# Patient Record
Sex: MALE | Race: WHITE | Employment: FULL TIME | URBAN - METROPOLITAN AREA
[De-identification: names, ages, dates, MRNs, and addresses within clinical notes are randomized per-mention and may not be internally consistent; named-entity substitution may affect disease eponyms.]

---

## 2019-03-31 ENCOUNTER — HOSPITAL ENCOUNTER (INPATIENT)
Age: 24
LOS: 2 days | Discharge: HOME | DRG: 420 | End: 2019-04-03
Attending: EMERGENCY MEDICINE | Admitting: HOSPITALIST
Payer: MEDICAID

## 2019-03-31 ENCOUNTER — APPOINTMENT (OUTPATIENT)
Dept: CT IMAGING | Age: 24
DRG: 420 | End: 2019-03-31
Attending: PHYSICIAN ASSISTANT
Payer: MEDICAID

## 2019-03-31 ENCOUNTER — APPOINTMENT (OUTPATIENT)
Dept: GENERAL RADIOLOGY | Age: 24
DRG: 420 | End: 2019-03-31
Attending: PHYSICIAN ASSISTANT
Payer: MEDICAID

## 2019-03-31 DIAGNOSIS — R11.2 NAUSEA AND VOMITING, INTRACTABILITY OF VOMITING NOT SPECIFIED, UNSPECIFIED VOMITING TYPE: ICD-10-CM

## 2019-03-31 DIAGNOSIS — R19.7 DIARRHEA, UNSPECIFIED TYPE: ICD-10-CM

## 2019-03-31 DIAGNOSIS — E10.10 DIABETIC KETOACIDOSIS WITHOUT COMA ASSOCIATED WITH TYPE 1 DIABETES MELLITUS (HCC): Primary | ICD-10-CM

## 2019-03-31 DIAGNOSIS — D72.829 LEUKOCYTOSIS, UNSPECIFIED TYPE: ICD-10-CM

## 2019-03-31 LAB
ADMINISTERED INITIALS, ADMINIT: NORMAL
ALBUMIN SERPL-MCNC: 4.6 G/DL (ref 3.4–5)
ALBUMIN/GLOB SERPL: 1.2 {RATIO} (ref 0.8–1.7)
ALP SERPL-CCNC: 159 U/L (ref 45–117)
ALT SERPL-CCNC: 37 U/L (ref 16–61)
ANION GAP SERPL CALC-SCNC: 27 MMOL/L (ref 3–18)
APPEARANCE UR: CLEAR
AST SERPL-CCNC: 18 U/L (ref 15–37)
BACTERIA URNS QL MICRO: ABNORMAL /HPF
BASOPHILS # BLD: 0 K/UL (ref 0–0.1)
BASOPHILS NFR BLD: 0 % (ref 0–3)
BILIRUB SERPL-MCNC: 1.4 MG/DL (ref 0.2–1)
BILIRUB UR QL: NEGATIVE
BUN SERPL-MCNC: 16 MG/DL (ref 7–18)
BUN/CREAT SERPL: 10 (ref 12–20)
CALCIUM SERPL-MCNC: 9.5 MG/DL (ref 8.5–10.1)
CHLORIDE SERPL-SCNC: 99 MMOL/L (ref 100–108)
CK MB CFR SERPL CALC: 1.6 % (ref 0–4)
CK MB SERPL-MCNC: 1.8 NG/ML (ref 5–25)
CK SERPL-CCNC: 114 U/L (ref 39–308)
CO2 SERPL-SCNC: 9 MMOL/L (ref 21–32)
COLOR UR: YELLOW
CREAT SERPL-MCNC: 1.65 MG/DL (ref 0.6–1.3)
D50 ADMINISTERED, D50ADM: 0 ML
D50 ORDER, D50ORD: 0 ML
DIFFERENTIAL METHOD BLD: ABNORMAL
EOSINOPHIL # BLD: 0 K/UL (ref 0–0.4)
EOSINOPHIL NFR BLD: 0 % (ref 0–5)
EPITH CASTS URNS QL MICRO: ABNORMAL /LPF (ref 0–5)
ERYTHROCYTE [DISTWIDTH] IN BLOOD BY AUTOMATED COUNT: 12.8 % (ref 11.6–14.5)
EST. AVERAGE GLUCOSE BLD GHB EST-MCNC: 275 MG/DL
GLOBULIN SER CALC-MCNC: 3.8 G/DL (ref 2–4)
GLUCOSE BLD STRIP.AUTO-MCNC: 333 MG/DL (ref 70–110)
GLUCOSE BLD STRIP.AUTO-MCNC: 349 MG/DL (ref 70–110)
GLUCOSE SERPL-MCNC: 415 MG/DL (ref 74–99)
GLUCOSE UR STRIP.AUTO-MCNC: >1000 MG/DL
GLUCOSE, GLC: 333 MG/DL
HBA1C MFR BLD: 11.2 % (ref 4.2–5.6)
HCT VFR BLD AUTO: 52.7 % (ref 36–48)
HGB BLD-MCNC: 18 G/DL (ref 13–16)
HGB UR QL STRIP: ABNORMAL
HIGH TARGET, HITG: 180 MG/DL
INSULIN ADMINSTERED, INSADM: 5.5 UNITS/HOUR
INSULIN ORDER, INSORD: 5.5 UNITS/HOUR
KETONES UR QL STRIP.AUTO: >160 MG/DL
LACTATE BLD-SCNC: 3.01 MMOL/L (ref 0.4–2)
LEUKOCYTE ESTERASE UR QL STRIP.AUTO: NEGATIVE
LIPASE SERPL-CCNC: 40 U/L (ref 73–393)
LOW TARGET, LOT: 140 MG/DL
LYMPHOCYTES # BLD: 2.3 K/UL (ref 0.8–3.5)
LYMPHOCYTES NFR BLD: 13 % (ref 20–51)
MAGNESIUM SERPL-MCNC: 2.6 MG/DL (ref 1.6–2.6)
MCH RBC QN AUTO: 29.6 PG (ref 24–34)
MCHC RBC AUTO-ENTMCNC: 34.2 G/DL (ref 31–37)
MCV RBC AUTO: 86.5 FL (ref 74–97)
MINUTES UNTIL NEXT BG, NBG: 60 MIN
MONOCYTES # BLD: 0.7 K/UL (ref 0–1)
MONOCYTES NFR BLD: 4 % (ref 2–9)
MULTIPLIER, MUL: 0.02
NEUTS BAND NFR BLD MANUAL: 5 % (ref 0–5)
NEUTS SEG # BLD: 14 K/UL (ref 1.8–8)
NEUTS SEG NFR BLD: 78 % (ref 42–75)
NITRITE UR QL STRIP.AUTO: NEGATIVE
ORDER INITIALS, ORDINIT: NORMAL
PH UR STRIP: 5.5 [PH] (ref 5–8)
PLATELET # BLD AUTO: 383 K/UL (ref 135–420)
PMV BLD AUTO: 10.5 FL (ref 9.2–11.8)
POTASSIUM SERPL-SCNC: 4.8 MMOL/L (ref 3.5–5.5)
PROT SERPL-MCNC: 8.4 G/DL (ref 6.4–8.2)
PROT UR STRIP-MCNC: 100 MG/DL
RBC # BLD AUTO: 6.09 M/UL (ref 4.7–5.5)
RBC #/AREA URNS HPF: ABNORMAL /HPF (ref 0–5)
RBC MORPH BLD: ABNORMAL
SODIUM SERPL-SCNC: 135 MMOL/L (ref 136–145)
SP GR UR REFRACTOMETRY: 1.03 (ref 1–1.03)
TROPONIN I SERPL-MCNC: <0.02 NG/ML (ref 0–0.04)
UROBILINOGEN UR QL STRIP.AUTO: 0.2 EU/DL (ref 0.2–1)
WBC # BLD AUTO: 18 K/UL (ref 4.6–13.2)
WBC URNS QL MICRO: ABNORMAL /HPF (ref 0–5)

## 2019-03-31 PROCEDURE — 71045 X-RAY EXAM CHEST 1 VIEW: CPT

## 2019-03-31 PROCEDURE — 74177 CT ABD & PELVIS W/CONTRAST: CPT

## 2019-03-31 PROCEDURE — 81001 URINALYSIS AUTO W/SCOPE: CPT

## 2019-03-31 PROCEDURE — 83735 ASSAY OF MAGNESIUM: CPT

## 2019-03-31 PROCEDURE — 74011636637 HC RX REV CODE- 636/637: Performed by: PHYSICIAN ASSISTANT

## 2019-03-31 PROCEDURE — 93005 ELECTROCARDIOGRAM TRACING: CPT

## 2019-03-31 PROCEDURE — 83036 HEMOGLOBIN GLYCOSYLATED A1C: CPT

## 2019-03-31 PROCEDURE — 87077 CULTURE AEROBIC IDENTIFY: CPT

## 2019-03-31 PROCEDURE — 99285 EMERGENCY DEPT VISIT HI MDM: CPT

## 2019-03-31 PROCEDURE — 87186 SC STD MICRODIL/AGAR DIL: CPT

## 2019-03-31 PROCEDURE — 96374 THER/PROPH/DIAG INJ IV PUSH: CPT

## 2019-03-31 PROCEDURE — 96365 THER/PROPH/DIAG IV INF INIT: CPT

## 2019-03-31 PROCEDURE — 74011250636 HC RX REV CODE- 250/636: Performed by: PHYSICIAN ASSISTANT

## 2019-03-31 PROCEDURE — 82550 ASSAY OF CK (CPK): CPT

## 2019-03-31 PROCEDURE — 83605 ASSAY OF LACTIC ACID: CPT

## 2019-03-31 PROCEDURE — 74011636320 HC RX REV CODE- 636/320: Performed by: EMERGENCY MEDICINE

## 2019-03-31 PROCEDURE — 87040 BLOOD CULTURE FOR BACTERIA: CPT

## 2019-03-31 PROCEDURE — 94761 N-INVAS EAR/PLS OXIMETRY MLT: CPT

## 2019-03-31 PROCEDURE — 96361 HYDRATE IV INFUSION ADD-ON: CPT

## 2019-03-31 PROCEDURE — 96367 TX/PROPH/DG ADDL SEQ IV INF: CPT

## 2019-03-31 PROCEDURE — 85025 COMPLETE CBC W/AUTO DIFF WBC: CPT

## 2019-03-31 PROCEDURE — 80053 COMPREHEN METABOLIC PANEL: CPT

## 2019-03-31 PROCEDURE — 83690 ASSAY OF LIPASE: CPT

## 2019-03-31 PROCEDURE — 82962 GLUCOSE BLOOD TEST: CPT

## 2019-03-31 PROCEDURE — 84100 ASSAY OF PHOSPHORUS: CPT

## 2019-03-31 PROCEDURE — 74011000258 HC RX REV CODE- 258: Performed by: PHYSICIAN ASSISTANT

## 2019-03-31 RX ORDER — SODIUM CHLORIDE 0.9 % (FLUSH) 0.9 %
5-10 SYRINGE (ML) INJECTION AS NEEDED
Status: DISCONTINUED | OUTPATIENT
Start: 2019-03-31 | End: 2019-04-03 | Stop reason: HOSPADM

## 2019-03-31 RX ORDER — MAGNESIUM SULFATE 100 %
4 CRYSTALS MISCELLANEOUS AS NEEDED
Status: DISCONTINUED | OUTPATIENT
Start: 2019-03-31 | End: 2019-04-02

## 2019-03-31 RX ORDER — ONDANSETRON 2 MG/ML
4 INJECTION INTRAMUSCULAR; INTRAVENOUS
Status: COMPLETED | OUTPATIENT
Start: 2019-03-31 | End: 2019-03-31

## 2019-03-31 RX ORDER — DEXTROSE 50 % IN WATER (D50W) INTRAVENOUS SYRINGE
25-50 AS NEEDED
Status: DISCONTINUED | OUTPATIENT
Start: 2019-03-31 | End: 2019-04-02

## 2019-03-31 RX ADMIN — SODIUM CHLORIDE 1000 ML: 900 INJECTION, SOLUTION INTRAVENOUS at 22:42

## 2019-03-31 RX ADMIN — SODIUM CHLORIDE 1000 ML: 900 INJECTION, SOLUTION INTRAVENOUS at 22:00

## 2019-03-31 RX ADMIN — ONDANSETRON 4 MG: 2 INJECTION INTRAMUSCULAR; INTRAVENOUS at 22:02

## 2019-03-31 RX ADMIN — HUMAN INSULIN 10 UNITS: 100 INJECTION, SOLUTION SUBCUTANEOUS at 22:38

## 2019-03-31 RX ADMIN — SODIUM CHLORIDE 5.5 UNITS/HR: 900 INJECTION, SOLUTION INTRAVENOUS at 23:31

## 2019-03-31 RX ADMIN — IOPAMIDOL 100 ML: 612 INJECTION, SOLUTION INTRAVENOUS at 23:06

## 2019-03-31 RX ADMIN — PIPERACILLIN AND TAZOBACTAM 3.38 G: 3; .375 INJECTION, POWDER, FOR SOLUTION INTRAVENOUS at 22:57

## 2019-04-01 PROBLEM — N28.9 RENAL INSUFFICIENCY: Status: ACTIVE | Noted: 2019-04-01

## 2019-04-01 PROBLEM — E10.9 TYPE I DIABETES MELLITUS (HCC): Status: ACTIVE | Noted: 2019-04-01

## 2019-04-01 PROBLEM — E11.9 TYPE 2 DIABETES MELLITUS, WITH LONG-TERM CURRENT USE OF INSULIN (HCC): Status: ACTIVE | Noted: 2019-04-01

## 2019-04-01 PROBLEM — Z79.4 TYPE 2 DIABETES MELLITUS, WITH LONG-TERM CURRENT USE OF INSULIN (HCC): Status: ACTIVE | Noted: 2019-04-01

## 2019-04-01 PROBLEM — E11.10 DKA (DIABETIC KETOACIDOSES): Status: ACTIVE | Noted: 2019-04-01

## 2019-04-01 LAB
ADMINISTERED INITIALS, ADMINIT: NORMAL
ANION GAP SERPL CALC-SCNC: 10 MMOL/L (ref 3–18)
ANION GAP SERPL CALC-SCNC: 12 MMOL/L (ref 3–18)
ANION GAP SERPL CALC-SCNC: 13 MMOL/L (ref 3–18)
ANION GAP SERPL CALC-SCNC: 18 MMOL/L (ref 3–18)
ANION GAP SERPL CALC-SCNC: 20 MMOL/L (ref 3–18)
ANION GAP SERPL CALC-SCNC: 6 MMOL/L (ref 3–18)
ANION GAP SERPL CALC-SCNC: 7 MMOL/L (ref 3–18)
ARTERIAL PATENCY WRIST A: ABNORMAL
BASE DEFICIT BLD-SCNC: 22 MMOL/L
BDY SITE: ABNORMAL
BODY TEMPERATURE: 98.6
BUN SERPL-MCNC: 11 MG/DL (ref 7–18)
BUN SERPL-MCNC: 13 MG/DL (ref 7–18)
BUN SERPL-MCNC: 8 MG/DL (ref 7–18)
BUN SERPL-MCNC: 9 MG/DL (ref 7–18)
BUN/CREAT SERPL: 10 (ref 12–20)
BUN/CREAT SERPL: 10 (ref 12–20)
BUN/CREAT SERPL: 7 (ref 12–20)
BUN/CREAT SERPL: 8 (ref 12–20)
BUN/CREAT SERPL: 9 (ref 12–20)
CALCIUM SERPL-MCNC: 7.8 MG/DL (ref 8.5–10.1)
CALCIUM SERPL-MCNC: 7.9 MG/DL (ref 8.5–10.1)
CALCIUM SERPL-MCNC: 8 MG/DL (ref 8.5–10.1)
CALCIUM SERPL-MCNC: 8.2 MG/DL (ref 8.5–10.1)
CALCIUM SERPL-MCNC: 8.2 MG/DL (ref 8.5–10.1)
CALCIUM SERPL-MCNC: 8.4 MG/DL (ref 8.5–10.1)
CALCIUM SERPL-MCNC: 8.5 MG/DL (ref 8.5–10.1)
CHLORIDE SERPL-SCNC: 106 MMOL/L (ref 100–108)
CHLORIDE SERPL-SCNC: 107 MMOL/L (ref 100–108)
CHLORIDE SERPL-SCNC: 108 MMOL/L (ref 100–108)
CHLORIDE SERPL-SCNC: 109 MMOL/L (ref 100–108)
CHLORIDE SERPL-SCNC: 109 MMOL/L (ref 100–108)
CHLORIDE SERPL-SCNC: 110 MMOL/L (ref 100–108)
CHLORIDE SERPL-SCNC: 110 MMOL/L (ref 100–108)
CO2 SERPL-SCNC: 11 MMOL/L (ref 21–32)
CO2 SERPL-SCNC: 17 MMOL/L (ref 21–32)
CO2 SERPL-SCNC: 17 MMOL/L (ref 21–32)
CO2 SERPL-SCNC: 19 MMOL/L (ref 21–32)
CO2 SERPL-SCNC: 22 MMOL/L (ref 21–32)
CO2 SERPL-SCNC: 23 MMOL/L (ref 21–32)
CO2 SERPL-SCNC: 9 MMOL/L (ref 21–32)
CREAT SERPL-MCNC: 1 MG/DL (ref 0.6–1.3)
CREAT SERPL-MCNC: 1.07 MG/DL (ref 0.6–1.3)
CREAT SERPL-MCNC: 1.07 MG/DL (ref 0.6–1.3)
CREAT SERPL-MCNC: 1.11 MG/DL (ref 0.6–1.3)
CREAT SERPL-MCNC: 1.13 MG/DL (ref 0.6–1.3)
CREAT SERPL-MCNC: 1.19 MG/DL (ref 0.6–1.3)
CREAT SERPL-MCNC: 1.28 MG/DL (ref 0.6–1.3)
D50 ADMINISTERED, D50ADM: 0 ML
D50 ORDER, D50ORD: 0 ML
GAS FLOW.O2 O2 DELIVERY SYS: ABNORMAL L/MIN
GLUCOSE BLD STRIP.AUTO-MCNC: 131 MG/DL (ref 70–110)
GLUCOSE BLD STRIP.AUTO-MCNC: 137 MG/DL (ref 70–110)
GLUCOSE BLD STRIP.AUTO-MCNC: 141 MG/DL (ref 70–110)
GLUCOSE BLD STRIP.AUTO-MCNC: 147 MG/DL (ref 70–110)
GLUCOSE BLD STRIP.AUTO-MCNC: 148 MG/DL (ref 70–110)
GLUCOSE BLD STRIP.AUTO-MCNC: 155 MG/DL (ref 70–110)
GLUCOSE BLD STRIP.AUTO-MCNC: 166 MG/DL (ref 70–110)
GLUCOSE BLD STRIP.AUTO-MCNC: 170 MG/DL (ref 70–110)
GLUCOSE BLD STRIP.AUTO-MCNC: 171 MG/DL (ref 70–110)
GLUCOSE BLD STRIP.AUTO-MCNC: 175 MG/DL (ref 70–110)
GLUCOSE BLD STRIP.AUTO-MCNC: 176 MG/DL (ref 70–110)
GLUCOSE BLD STRIP.AUTO-MCNC: 177 MG/DL (ref 70–110)
GLUCOSE BLD STRIP.AUTO-MCNC: 180 MG/DL (ref 70–110)
GLUCOSE BLD STRIP.AUTO-MCNC: 182 MG/DL (ref 70–110)
GLUCOSE BLD STRIP.AUTO-MCNC: 182 MG/DL (ref 70–110)
GLUCOSE BLD STRIP.AUTO-MCNC: 185 MG/DL (ref 70–110)
GLUCOSE BLD STRIP.AUTO-MCNC: 207 MG/DL (ref 70–110)
GLUCOSE BLD STRIP.AUTO-MCNC: 224 MG/DL (ref 70–110)
GLUCOSE BLD STRIP.AUTO-MCNC: 241 MG/DL (ref 70–110)
GLUCOSE BLD STRIP.AUTO-MCNC: 246 MG/DL (ref 70–110)
GLUCOSE BLD STRIP.AUTO-MCNC: 301 MG/DL (ref 70–110)
GLUCOSE SERPL-MCNC: 140 MG/DL (ref 74–99)
GLUCOSE SERPL-MCNC: 171 MG/DL (ref 74–99)
GLUCOSE SERPL-MCNC: 178 MG/DL (ref 74–99)
GLUCOSE SERPL-MCNC: 193 MG/DL (ref 74–99)
GLUCOSE SERPL-MCNC: 213 MG/DL (ref 74–99)
GLUCOSE SERPL-MCNC: 255 MG/DL (ref 74–99)
GLUCOSE SERPL-MCNC: 327 MG/DL (ref 74–99)
GLUCOSE, GLC: 131 MG/DL
GLUCOSE, GLC: 137 MG/DL
GLUCOSE, GLC: 141 MG/DL
GLUCOSE, GLC: 147 MG/DL
GLUCOSE, GLC: 148 MG/DL
GLUCOSE, GLC: 155 MG/DL
GLUCOSE, GLC: 166 MG/DL
GLUCOSE, GLC: 170 MG/DL
GLUCOSE, GLC: 171 MG/DL
GLUCOSE, GLC: 175 MG/DL
GLUCOSE, GLC: 176 MG/DL
GLUCOSE, GLC: 177 MG/DL
GLUCOSE, GLC: 180 MG/DL
GLUCOSE, GLC: 182 MG/DL
GLUCOSE, GLC: 182 MG/DL
GLUCOSE, GLC: 185 MG/DL
GLUCOSE, GLC: 207 MG/DL
GLUCOSE, GLC: 224 MG/DL
GLUCOSE, GLC: 241 MG/DL
GLUCOSE, GLC: 246 MG/DL
GLUCOSE, GLC: 301 MG/DL
HCO3 BLD-SCNC: 7.4 MMOL/L (ref 22–26)
HIGH TARGET, HITG: 180 MG/DL
INSULIN ADMINSTERED, INSADM: 1.6 UNITS/HOUR
INSULIN ADMINSTERED, INSADM: 10.9 UNITS/HOUR
INSULIN ADMINSTERED, INSADM: 2.2 UNITS/HOUR
INSULIN ADMINSTERED, INSADM: 2.3 UNITS/HOUR
INSULIN ADMINSTERED, INSADM: 2.5 UNITS/HOUR
INSULIN ADMINSTERED, INSADM: 2.9 UNITS/HOUR
INSULIN ADMINSTERED, INSADM: 3.1 UNITS/HOUR
INSULIN ADMINSTERED, INSADM: 3.2 UNITS/HOUR
INSULIN ADMINSTERED, INSADM: 3.4 UNITS/HOUR
INSULIN ADMINSTERED, INSADM: 3.5 UNITS/HOUR
INSULIN ADMINSTERED, INSADM: 3.6 UNITS/HOUR
INSULIN ADMINSTERED, INSADM: 3.7 UNITS/HOUR
INSULIN ADMINSTERED, INSADM: 3.9 UNITS/HOUR
INSULIN ADMINSTERED, INSADM: 4.2 UNITS/HOUR
INSULIN ADMINSTERED, INSADM: 4.4 UNITS/HOUR
INSULIN ADMINSTERED, INSADM: 5.8 UNITS/HOUR
INSULIN ADMINSTERED, INSADM: 5.9 UNITS/HOUR
INSULIN ADMINSTERED, INSADM: 6.1 UNITS/HOUR
INSULIN ADMINSTERED, INSADM: 6.6 UNITS/HOUR
INSULIN ADMINSTERED, INSADM: 9.3 UNITS/HOUR
INSULIN ADMINSTERED, INSADM: 9.6 UNITS/HOUR
INSULIN ORDER, INSORD: 1.6 UNITS/HOUR
INSULIN ORDER, INSORD: 10.9 UNITS/HOUR
INSULIN ORDER, INSORD: 2.2 UNITS/HOUR
INSULIN ORDER, INSORD: 2.3 UNITS/HOUR
INSULIN ORDER, INSORD: 2.5 UNITS/HOUR
INSULIN ORDER, INSORD: 2.9 UNITS/HOUR
INSULIN ORDER, INSORD: 3.1 UNITS/HOUR
INSULIN ORDER, INSORD: 3.2 UNITS/HOUR
INSULIN ORDER, INSORD: 3.4 UNITS/HOUR
INSULIN ORDER, INSORD: 3.5 UNITS/HOUR
INSULIN ORDER, INSORD: 3.6 UNITS/HOUR
INSULIN ORDER, INSORD: 3.7 UNITS/HOUR
INSULIN ORDER, INSORD: 3.9 UNITS/HOUR
INSULIN ORDER, INSORD: 4.2 UNITS/HOUR
INSULIN ORDER, INSORD: 4.4 UNITS/HOUR
INSULIN ORDER, INSORD: 5.8 UNITS/HOUR
INSULIN ORDER, INSORD: 5.9 UNITS/HOUR
INSULIN ORDER, INSORD: 6.1 UNITS/HOUR
INSULIN ORDER, INSORD: 6.6 UNITS/HOUR
INSULIN ORDER, INSORD: 9.3 UNITS/HOUR
INSULIN ORDER, INSORD: 9.6 UNITS/HOUR
LACTATE BLD-SCNC: 0.94 MMOL/L (ref 0.4–2)
LOW TARGET, LOT: 140 MG/DL
MINUTES UNTIL NEXT BG, NBG: 60 MIN
MULTIPLIER, MUL: 0.01
MULTIPLIER, MUL: 0.02
MULTIPLIER, MUL: 0.03
MULTIPLIER, MUL: 0.04
MULTIPLIER, MUL: 0.05
MULTIPLIER, MUL: 0.06
MULTIPLIER, MUL: 0.06
O2/TOTAL GAS SETTING VFR VENT: 0.21 %
ORDER INITIALS, ORDINIT: NORMAL
PCO2 BLD: 22.9 MMHG (ref 35–45)
PH BLD: 7.12 [PH] (ref 7.35–7.45)
PHOSPHATE SERPL-MCNC: 6.8 MG/DL (ref 2.5–4.9)
PO2 BLD: 122 MMHG (ref 80–100)
POTASSIUM SERPL-SCNC: 3.3 MMOL/L (ref 3.5–5.5)
POTASSIUM SERPL-SCNC: 3.5 MMOL/L (ref 3.5–5.5)
POTASSIUM SERPL-SCNC: 3.6 MMOL/L (ref 3.5–5.5)
POTASSIUM SERPL-SCNC: 3.9 MMOL/L (ref 3.5–5.5)
POTASSIUM SERPL-SCNC: 4.8 MMOL/L (ref 3.5–5.5)
POTASSIUM SERPL-SCNC: 4.8 MMOL/L (ref 3.5–5.5)
POTASSIUM SERPL-SCNC: 5.3 MMOL/L (ref 3.5–5.5)
SAO2 % BLD: 97 % (ref 92–97)
SERVICE CMNT-IMP: ABNORMAL
SODIUM SERPL-SCNC: 135 MMOL/L (ref 136–145)
SODIUM SERPL-SCNC: 136 MMOL/L (ref 136–145)
SODIUM SERPL-SCNC: 138 MMOL/L (ref 136–145)
SODIUM SERPL-SCNC: 140 MMOL/L (ref 136–145)
SPECIMEN TYPE: ABNORMAL
TOTAL RESP. RATE, ITRR: 26

## 2019-04-01 PROCEDURE — 74011000258 HC RX REV CODE- 258: Performed by: HOSPITALIST

## 2019-04-01 PROCEDURE — 74011250636 HC RX REV CODE- 250/636: Performed by: PHYSICIAN ASSISTANT

## 2019-04-01 PROCEDURE — 83605 ASSAY OF LACTIC ACID: CPT

## 2019-04-01 PROCEDURE — 74011636637 HC RX REV CODE- 636/637: Performed by: PHYSICIAN ASSISTANT

## 2019-04-01 PROCEDURE — 74011000250 HC RX REV CODE- 250: Performed by: PHYSICIAN ASSISTANT

## 2019-04-01 PROCEDURE — 74011000258 HC RX REV CODE- 258: Performed by: PHYSICIAN ASSISTANT

## 2019-04-01 PROCEDURE — 80048 BASIC METABOLIC PNL TOTAL CA: CPT

## 2019-04-01 PROCEDURE — 36600 WITHDRAWAL OF ARTERIAL BLOOD: CPT

## 2019-04-01 PROCEDURE — 74011250637 HC RX REV CODE- 250/637: Performed by: INTERNAL MEDICINE

## 2019-04-01 PROCEDURE — 65610000006 HC RM INTENSIVE CARE

## 2019-04-01 PROCEDURE — 82962 GLUCOSE BLOOD TEST: CPT

## 2019-04-01 PROCEDURE — 83735 ASSAY OF MAGNESIUM: CPT

## 2019-04-01 PROCEDURE — 74011250636 HC RX REV CODE- 250/636: Performed by: INTERNAL MEDICINE

## 2019-04-01 PROCEDURE — 82803 BLOOD GASES ANY COMBINATION: CPT

## 2019-04-01 PROCEDURE — 36415 COLL VENOUS BLD VENIPUNCTURE: CPT

## 2019-04-01 RX ORDER — POTASSIUM CHLORIDE 750 MG/1
10 TABLET, EXTENDED RELEASE ORAL
Status: DISCONTINUED | OUTPATIENT
Start: 2019-04-01 | End: 2019-04-01

## 2019-04-01 RX ORDER — SODIUM CHLORIDE 9 MG/ML
250 INJECTION, SOLUTION INTRAVENOUS CONTINUOUS
Status: DISCONTINUED | OUTPATIENT
Start: 2019-04-01 | End: 2019-04-01

## 2019-04-01 RX ORDER — ONDANSETRON 2 MG/ML
4 INJECTION INTRAMUSCULAR; INTRAVENOUS
Status: DISCONTINUED | OUTPATIENT
Start: 2019-04-01 | End: 2019-04-03 | Stop reason: HOSPADM

## 2019-04-01 RX ORDER — POTASSIUM CHLORIDE 20 MEQ/1
40 TABLET, EXTENDED RELEASE ORAL
Status: COMPLETED | OUTPATIENT
Start: 2019-04-01 | End: 2019-04-01

## 2019-04-01 RX ORDER — HEPARIN SODIUM 5000 [USP'U]/ML
5000 INJECTION, SOLUTION INTRAVENOUS; SUBCUTANEOUS EVERY 8 HOURS
Status: DISCONTINUED | OUTPATIENT
Start: 2019-04-01 | End: 2019-04-03 | Stop reason: HOSPADM

## 2019-04-01 RX ORDER — DEXTROSE MONOHYDRATE AND SODIUM CHLORIDE 5; .9 G/100ML; G/100ML
75 INJECTION, SOLUTION INTRAVENOUS CONTINUOUS
Status: DISCONTINUED | OUTPATIENT
Start: 2019-04-01 | End: 2019-04-03 | Stop reason: HOSPADM

## 2019-04-01 RX ORDER — POTASSIUM CHLORIDE 7.45 MG/ML
10 INJECTION INTRAVENOUS
Status: DISCONTINUED | OUTPATIENT
Start: 2019-04-02 | End: 2019-04-02

## 2019-04-01 RX ORDER — SODIUM CHLORIDE 9 MG/ML
150 INJECTION, SOLUTION INTRAVENOUS CONTINUOUS
Status: DISCONTINUED | OUTPATIENT
Start: 2019-04-01 | End: 2019-04-01

## 2019-04-01 RX ORDER — POTASSIUM CHLORIDE 7.45 MG/ML
10 INJECTION INTRAVENOUS
Status: COMPLETED | OUTPATIENT
Start: 2019-04-01 | End: 2019-04-01

## 2019-04-01 RX ORDER — POTASSIUM CHLORIDE 20 MEQ/1
40 TABLET, EXTENDED RELEASE ORAL ONCE
Status: COMPLETED | OUTPATIENT
Start: 2019-04-01 | End: 2019-04-01

## 2019-04-01 RX ADMIN — POTASSIUM CHLORIDE 10 MEQ: 10 INJECTION, SOLUTION INTRAVENOUS at 14:55

## 2019-04-01 RX ADMIN — PIPERACILLIN AND TAZOBACTAM 3.38 G: 3; .375 INJECTION, POWDER, FOR SOLUTION INTRAVENOUS at 04:09

## 2019-04-01 RX ADMIN — HEPARIN SODIUM 5000 UNITS: 5000 INJECTION INTRAVENOUS; SUBCUTANEOUS at 10:07

## 2019-04-01 RX ADMIN — SODIUM CHLORIDE 150 ML/HR: 900 INJECTION, SOLUTION INTRAVENOUS at 00:39

## 2019-04-01 RX ADMIN — HEPARIN SODIUM 5000 UNITS: 5000 INJECTION INTRAVENOUS; SUBCUTANEOUS at 17:37

## 2019-04-01 RX ADMIN — DEXTROSE MONOHYDRATE AND SODIUM CHLORIDE 150 ML/HR: 5; .9 INJECTION, SOLUTION INTRAVENOUS at 02:54

## 2019-04-01 RX ADMIN — DEXTROSE MONOHYDRATE: 5 INJECTION, SOLUTION INTRAVENOUS at 06:28

## 2019-04-01 RX ADMIN — SODIUM CHLORIDE 6.6 UNITS/HR: 900 INJECTION, SOLUTION INTRAVENOUS at 20:46

## 2019-04-01 RX ADMIN — SODIUM CHLORIDE 250 ML/HR: 900 INJECTION, SOLUTION INTRAVENOUS at 00:10

## 2019-04-01 RX ADMIN — POTASSIUM CHLORIDE 40 MEQ: 20 TABLET, EXTENDED RELEASE ORAL at 13:22

## 2019-04-01 RX ADMIN — POTASSIUM CHLORIDE 10 MEQ: 10 INJECTION, SOLUTION INTRAVENOUS at 15:24

## 2019-04-01 RX ADMIN — POTASSIUM CHLORIDE 40 MEQ: 20 TABLET, EXTENDED RELEASE ORAL at 20:25

## 2019-04-01 RX ADMIN — POTASSIUM CHLORIDE 10 MEQ: 10 INJECTION, SOLUTION INTRAVENOUS at 13:22

## 2019-04-01 RX ADMIN — DEXTROSE MONOHYDRATE AND SODIUM CHLORIDE 100 ML/HR: 5; .9 INJECTION, SOLUTION INTRAVENOUS at 12:16

## 2019-04-01 RX ADMIN — DEXTROSE MONOHYDRATE AND SODIUM CHLORIDE 125 ML/HR: 5; .9 INJECTION, SOLUTION INTRAVENOUS at 21:44

## 2019-04-01 RX ADMIN — PIPERACILLIN AND TAZOBACTAM 3.38 G: 3; .375 INJECTION, POWDER, FOR SOLUTION INTRAVENOUS at 10:07

## 2019-04-01 RX ADMIN — PIPERACILLIN AND TAZOBACTAM 3.38 G: 3; .375 INJECTION, POWDER, FOR SOLUTION INTRAVENOUS at 17:37

## 2019-04-01 RX ADMIN — HEPARIN SODIUM 5000 UNITS: 5000 INJECTION INTRAVENOUS; SUBCUTANEOUS at 01:10

## 2019-04-01 RX ADMIN — ONDANSETRON 4 MG: 2 INJECTION INTRAMUSCULAR; INTRAVENOUS at 01:16

## 2019-04-01 NOTE — DIABETES MGMT
NUTRITION / GLYCEMIC CONTROL PLAN OF CARE 
- consult received r/t DKA & pt on Glucostabilizer insulin drip, pt with known h/o poorly managed DM1 ~ 19 years - HbA1c not within recommended range for age + comorbids, r/t non-adherence to DM regimen, insulin degradation and SMBG inconsistency - per pt rationing insulin, infusion sets and test strips r/t w/o insurance until recent approval for medicaid; pt wearing infusion set for 6 days causing degradation of insulin 
- Dr. Lazaro Mendoza Endocrinologist, last visit June 2018 r/t lack insurance, pt reports he has an appointment soon 
- pt does have h/o major depressive disorder, SI with TDO & IP behavioral health last documentation September 2018 (RN & supervisor notified) 
- recommend transition pt to basal/bolus IP, 24 hours after last Lantus dose given prior to d/c transition to [employer] insulin pump; see insulin pump consult - pt will need the following scripts at d/c *Humalog vial for insulin pump *Lancets and strips for 4-6x/day SMBG 
 *Glucagon Kit Diabetes Management:  
- recommend: insulin pump basal rates 1-1.5 units/hr; transition to basal/bolus IP regimen with goal of pt starting insulin pump prior to d/c; BG trending up with PO intake; pt has T1DM, please & must have basal + mealtime + corrective coverage orders  
- education: (see GC RN note) 
- goals: *BG will be in target range of 110-140 mg/dl (non-ICU) by 4/8 *PO intake will be 75% of meals offered by 4/8 *Pt will follow up with OP PCP for Diabetes Management by 5/31 
- TDD: insulin gtt 
- BG range: 147-327 mg/dl - Hypo: no 
- BG in target range (non-ICU: <140; ICU<180): [] Yes  [x] No 
- Steroids: no 
- Intake:  
 Patient Vitals for the past 100 hrs: 
 % Diet Eaten 04/01/19 0904 100 % Current Insulin regimen:  
Insulin gtt Home medication/insulin regimen: 
Medtronic home insulin pump Recent Glucose Results:  
Lab Results Component Value Date/Time  (H) 04/01/2019 03:25 PM  
  (H) 04/01/2019 11:49 AM  
  (H) 04/01/2019 08:00 AM  
 GLUCPOC 301 (H) 04/01/2019 03:22 PM  
 GLUCPOC 137 (H) 04/01/2019 12:13 PM  
 GLUCPOC 182 (H) 04/01/2019 11:07 AM  
 
Adequate glycemic control PTA:  [] Yes  [x] No 
 
HbA1c: equivalent  to ave BGlucose of 275 mg/dl for 2-3 months prior to admission Lab Results Component Value Date/Time Hemoglobin A1c 11.2 (H) 03/31/2019 09:35 PM  
Diet:  
Active Orders Diet DIET DIABETIC WITH OPTIONS Consistent Carb 2000kcal; Regular [place] MS, RN, CDE Glycemic Control Team 
[phone] Pager [phone] (M-TH 8:00-4:30P) *After Hours pager [phone]

## 2019-04-01 NOTE — ED NOTES
Verbal shift change report given to Gavino De Guzman (oncoming nurse) by Georgia Box (offgoing nurse). Report included the following information SBAR, Kardex, ED Summary, Procedure Summary, Intake/Output, MAR, Recent Results and Cardiac Rhythm sinus tach.

## 2019-04-01 NOTE — CONSULTS
[facility]                  Pulmonary, Critical Care, and Sleep Medicine     Name: Lance Moctezuma MRN: [id number]   : 1995 Hospital: [facility] [place]    Date: 2019        PCCM Note                                              Consult requesting physician: Dr. Padmaja Wyatt   Reason for Consult: DKA, acidosis    Subjective/History of Present Illness:     Patient is a 24 y.o. male with PMHx significant for ADD, seizure d/o, IDDM on insulin pump, admitted with DKA    2019   Pt admitted being noncompliant to insulin since he was not feeling well and feeling \"sick\"  Admitted with DKA  On insulin drip  On bicarb drip for acidosis  Lactate normalized  No fever chills  Nausea and abd pain improved. Ct abd pelvis negative  No leg edema cp dyspnea cough sinus pain dysuria  No other issues overnight. The patient is critically ill. History taken from patient and EMR    Review of Systems:  A comprehensive review of systems was negative except for that written in the HPI. Review of Systems:   HEENT: No epistaxis, no nasal drainage, no difficulty in swallowing, no redness in eyes  Respiratory: as above  Cardiovascular: no chest pain, no palpitations, no chronic leg edema, no syncope  Gastrointestinal: no diarrhea, no bleeding symptoms  Genitourinary: No urinary symptoms or hematuria  Integument/breast: No ulcers or rashes  Musculoskeletal:Neg  Neurological: No focal weakness, no seizures, no headaches  Behvioral/Psych: No anxiety, no depression  Constitutional: No fever, no chills, no weight loss, no night sweats     No Known Allergies   Past Medical History:   Diagnosis Date    ADD (attention deficit disorder)     Diabetes ([facility] [address] [facility]     Insomnia     [facility]       History reviewed. No pertinent surgical history. Social History     Tobacco Use    Smoking status: Never Smoker    Smokeless tobacco: Current User   Substance Use Topics    Alcohol use:  Yes Alcohol/week: 9.6 oz     Types: 10 Cans of beer, 6 Shots of liquor per week      History reviewed. No pertinent family history. Prior to Admission medications    Medication Sig Start Date End Date Taking? Authorizing Provider   fluoxetine HCl (PROZAC PO) Take  by mouth. Yes Other, MD Brittni   RANITIDINE HCL PO Take  by mouth. Yes Other, MD Brittni   promethazine HCl (PROMETHAZINE PO) Take  by mouth. Yes Other, MD Brittni   AMPHET ASP/AMPHET/D-AMPHET (ADDERALL PO) Take  by mouth. Yes Other, MD Brittni   insulin lispro (HUMALOG) 100 unit/mL injection by SubCUTAneous route. Yes Shahzad, MD Brittni    insulin pump (PATIENT SUPPLIED) Misc by SubCUTAneous route as needed. Yes Other, MD Brittni   HYDROcodone-acetaminophen (NORCO) 5-325 mg per tablet Take 1 Tab by mouth every four (4) hours as needed for Pain. 13   Jordana Townsend NP   nystatin-TCN-HC-diphenhydramin (FIRST-RICCO'S MOUTHWASH) SusR suspension Take 5 mL by mouth every six (6) hours.  13   Jordana Townsend NP     Current Facility-Administered Medications   Medication Dose Route Frequency    heparin (porcine) injection 5,000 Units  5,000 Units SubCUTAneous Q8H    dextrose 5% and 0.9% NaCl infusion  100 mL/hr IntraVENous CONTINUOUS    sodium bicarbonate (8.4%) 50 mEq in dextrose 5% 1,000 mL infusion   IntraVENous CONTINUOUS    piperacillin-tazobactam (ZOSYN) 3.375 g in 0.9% sodium chloride (MBP/ADV) 100 mL MBP  3.375 g IntraVENous Q6H    insulin regular (NOVOLIN R, HUMULIN R) 100 Units in 0.9% sodium chloride 100 mL infusion  0-50 Units/hr IntraVENous TITRATE         Objective:   Vital Signs:    Visit Vitals  /75   Pulse (!) 104   Temp 98.5 °F (36.9 °C)   Resp 22   Ht 5' 11\" (1.803 m)   Wt 73.2 kg (161 lb 6 oz)   SpO2 100%   BMI 22.51 kg/m²       O2 Device: Room air       Temp (24hrs), Av.2 °F (36.8 °C), Min:97.4 °F (36.3 °C), Max:98.6 °F (37 °C)       Intake/Output:   Last shift:      701 - 1900  In: 120 [P.O.:120]  Out: - Last 3 shifts: 03/30 1901 - 04/01 0700  In: 3062.2 [I.V.:3062.2]  Out: 8625 [Urine:1675]      Intake/Output Summary (Last 24 hours) at 4/1/2019 1057  Last data filed at 4/1/2019 0904  Gross per 24 hour   Intake 3182.17 ml   Output 1675 ml   Net 1507.17 ml       Last 3 Recorded Weights in this Encounter    03/31/19 2104 04/01/19 0045   Weight: 77.1 kg (170 lb) 73.2 kg (161 lb 6 oz)       Physical Exam:     General/Neurology: Alert, Awake, NAD. Head:   Normocephalic, without obvious abnormality, atraumatic. Eye:   EOM intact, no scleral icterus, no pallor, no cyanosis. Nose:   No sinus tenderness. Throat:  Lips, mucosa, and tongue normal. No oral thrush. Neck:   Supple, symmetric. No lymphadenopathy. Trachea midline  Lung: Moderate air entry bilateral equal. No rales. No rhonchi. No wheezing. No stridors. No prolongded expiration. No accessory muscle use. Heart:   Regular rate & rhythm. S1 S2 present. No murmur. No JVD. Abdomen:  Soft. NT. ND. +BS. No masses. Extremities:  No pedal edema. No cyanosis. No clubbing. Pulses: 2+ and symmetric in DP. Capillary refill: normal  Lymphatic:  No cervical or supraclavicular palpable lymphadenopathy. Musculoskeletal: No joint swelling. No tenderness. Skin:   Color, texture, turgor normal. No rashes or lesions.        Data:       Recent Results (from the past 24 hour(s))   GLUCOSE, POC    Collection Time: 03/31/19  9:15 PM   Result Value Ref Range    Glucose (POC) 349 (H) 70 - 110 mg/dL   URINALYSIS W/ RFLX MICROSCOPIC    Collection Time: 03/31/19  9:30 PM   Result Value Ref Range    Color YELLOW      Appearance CLEAR      Specific gravity 1.030 1.005 - 1.030      pH (UA) 5.5 5.0 - 8.0      Protein 100 (A) NEG mg/dL    Glucose >1,000 (A) NEG mg/dL    Ketone >160 (A) NEG mg/dL    Bilirubin NEGATIVE  NEG      Blood SMALL (A) NEG      Urobilinogen 0.2 0.2 - 1.0 EU/dL    Nitrites NEGATIVE  NEG      Leukocyte Esterase NEGATIVE  NEG     URINE MICROSCOPIC ONLY Collection Time: 03/31/19  9:30 PM   Result Value Ref Range    WBC 0 to 3 0 - 5 /hpf    RBC 5 to 10 0 - 5 /hpf    Epithelial cells 1+ 0 - 5 /lpf    Bacteria 1+ (A) NEG /hpf   LIPASE    Collection Time: 03/31/19  9:35 PM   Result Value Ref Range    Lipase 40 (L) 73 - 205 U/L   METABOLIC PANEL, COMPREHENSIVE    Collection Time: 03/31/19  9:35 PM   Result Value Ref Range    Sodium 135 (L) 136 - 145 mmol/L    Potassium 4.8 3.5 - 5.5 mmol/L    Chloride 99 (L) 100 - 108 mmol/L    CO2 9 (LL) 21 - 32 mmol/L    Anion gap 27 (H) 3.0 - 18 mmol/L    Glucose 415 (HH) 74 - 99 mg/dL    BUN 16 7.0 - 18 MG/DL    Creatinine 1.65 (H) 0.6 - 1.3 MG/DL    BUN/Creatinine ratio 10 (L) 12 - 20      GFR est AA >60 >60 ml/min/1.73m2    GFR est non-AA 52 (L) >60 ml/min/1.73m2    Calcium 9.5 8.5 - 10.1 MG/DL    Bilirubin, total 1.4 (H) 0.2 - 1.0 MG/DL    ALT (SGPT) 37 16 - 61 U/L    AST (SGOT) 18 15 - 37 U/L    Alk. phosphatase 159 (H) 45 - 117 U/L    Protein, total 8.4 (H) 6.4 - 8.2 g/dL    Albumin 4.6 3.4 - 5.0 g/dL    Globulin 3.8 2.0 - 4.0 g/dL    A-G Ratio 1.2 0.8 - 1.7     CBC WITH AUTOMATED DIFF    Collection Time: 03/31/19  9:35 PM   Result Value Ref Range    WBC 18.0 (H) 4.6 - 13.2 K/uL    RBC 6.09 (H) 4.70 - 5.50 M/uL    HGB 18.0 (H) 13.0 - 16.0 g/dL    HCT 52.7 (H) 36.0 - 48.0 %    MCV 86.5 74.0 - 97.0 FL    MCH 29.6 24.0 - 34.0 PG    MCHC 34.2 31.0 - 37.0 g/dL    RDW 12.8 11.6 - 14.5 %    PLATELET 301 585 - 561 K/uL    MPV 10.5 9.2 - 11.8 FL    NEUTROPHILS 78 (H) 42 - 75 %    BAND NEUTROPHILS 5 0 - 5 %    LYMPHOCYTES 13 (L) 20 - 51 %    MONOCYTES 4 2 - 9 %    EOSINOPHILS 0 0 - 5 %    BASOPHILS 0 0 - 3 %    ABS. NEUTROPHILS 14.0 (H) 1.8 - 8.0 K/UL    ABS. LYMPHOCYTES 2.3 0.8 - 3.5 K/UL    ABS. MONOCYTES 0.7 0 - 1.0 K/UL    ABS. EOSINOPHILS 0.0 0.0 - 0.4 K/UL    ABS.  BASOPHILS 0.0 0.0 - 0.1 K/UL    RBC COMMENTS NORMOCYTIC, NORMOCHROMIC      DF MANUAL     CARDIAC PANEL,(CK, CKMB & TROPONIN)    Collection Time: 03/31/19  9:35 PM Result Value Ref Range     39 - 308 U/L    CK - MB 1.8 <3.6 ng/ml    CK-MB Index 1.6 0.0 - 4.0 %    Troponin-I, QT <0.02 0.0 - 0.045 NG/ML   MAGNESIUM    Collection Time: 03/31/19  9:35 PM   Result Value Ref Range    Magnesium 2.6 1.6 - 2.6 mg/dL   PHOSPHORUS    Collection Time: 03/31/19  9:35 PM   Result Value Ref Range    Phosphorus 6.8 (H) 2.5 - 4.9 MG/DL   HEMOGLOBIN A1C WITH EAG    Collection Time: 03/31/19  9:35 PM   Result Value Ref Range    Hemoglobin A1c 11.2 (H) 4.2 - 5.6 %    Est. average glucose 275 mg/dL   EKG, 12 LEAD, INITIAL    Collection Time: 03/31/19  9:37 PM   Result Value Ref Range    Ventricular Rate 118 BPM    Atrial Rate 118 BPM    P-R Interval 158 ms    QRS Duration 98 ms    Q-T Interval 318 ms    QTC Calculation (Bezet) 445 ms    Calculated P Axis 73 degrees    Calculated R Axis 83 degrees    Calculated T Axis 43 degrees    Diagnosis       Sinus tachycardia  Possible Left atrial enlargement  Borderline ECG  No previous ECGs available     CULTURE, BLOOD    Collection Time: 03/31/19 10:24 PM   Result Value Ref Range    Special Requests: NO SPECIAL REQUESTS      Culture result: NO GROWTH AFTER 10 HOURS     POC LACTIC ACID    Collection Time: 03/31/19 10:25 PM   Result Value Ref Range    Lactic Acid (POC) 3.01 (HH) 0.40 - 2.00 mmol/L   CULTURE, BLOOD    Collection Time: 03/31/19 10:40 PM   Result Value Ref Range    Special Requests: NO SPECIAL REQUESTS      Culture result: NO GROWTH AFTER 10 HOURS     GLUCOSE, POC    Collection Time: 03/31/19 11:23 PM   Result Value Ref Range    Glucose (POC) 333 (H) 70 - 110 mg/dL   GLUCOSTABILIZER    Collection Time: 03/31/19 11:26 PM   Result Value Ref Range    Glucose 333 mg/dL    Insulin order 5.5 units/hour    Insulin adminstered 5.5 units/hour    Multiplier 0.020     Low target 140 mg/dL    High target 180 mg/dL    D50 order 0.0 ml    D50 administered 0.00 ml    Minutes until next BG 60 min    Order initials MLC     Administered initials MLC POC LACTIC ACID    Collection Time: 04/01/19 12:12 AM   Result Value Ref Range    Lactic Acid (POC) 0.94 0.40 - 2.00 mmol/L   GLUCOSE, POC    Collection Time: 04/01/19 12:29 AM   Result Value Ref Range    Glucose (POC) 224 (H) 70 - 110 mg/dL   GLUCOSTABILIZER    Collection Time: 04/01/19 12:30 AM   Result Value Ref Range    Glucose 224 mg/dL    Insulin order 1.6 units/hour    Insulin adminstered 1.6 units/hour    Multiplier 0.010     Low target 140 mg/dL    High target 180 mg/dL    D50 order 0.0 ml    D50 administered 0.00 ml    Minutes until next BG 60 min    Order initials MLC     Administered initials [address] [facility]    POC G3    Collection Time: 04/01/19  1:07 AM   Result Value Ref Range    Device: ROOM AIR      FIO2 (POC) 0.21 %    pH (POC) 7.119 (LL) 7.35 - 7.45      pCO2 (POC) 22.9 (L) 35.0 - 45.0 MMHG    pO2 (POC) 122 (H) 80 - 100 MMHG    HCO3 (POC) 7.4 (L) 22 - 26 MMOL/L    sO2 (POC) 97 92 - 97 %    Base deficit (POC) 22 mmol/L    Allens test (POC) N/A      Total resp. rate 26      Site RIGHT BRACHIAL      Patient temp.  98.6      Specimen type (POC) ARTERIAL      Performed by Hazel Sensing    METABOLIC PANEL, BASIC    Collection Time: 04/01/19  1:15 AM   Result Value Ref Range    Sodium 138 136 - 145 mmol/L    Potassium 5.3 3.5 - 5.5 mmol/L    Chloride 107 100 - 108 mmol/L    CO2 11 (L) 21 - 32 mmol/L    Anion gap 20 (H) 3.0 - 18 mmol/L    Glucose 213 (H) 74 - 99 mg/dL    BUN 13 7.0 - 18 MG/DL    Creatinine 1.28 0.6 - 1.3 MG/DL    BUN/Creatinine ratio 10 (L) 12 - 20      GFR est AA >60 >60 ml/min/1.73m2    GFR est non-AA >60 >60 ml/min/1.73m2    Calcium 8.4 (L) 8.5 - 10.1 MG/DL   GLUCOSE, POC    Collection Time: 04/01/19  1:36 AM   Result Value Ref Range    Glucose (POC) 185 (H) 70 - 110 mg/dL   GLUCOSTABILIZER    Collection Time: 04/01/19  1:37 AM   Result Value Ref Range    Glucose 185 mg/dL    Insulin order 2.5 units/hour    Insulin adminstered 2.5 units/hour    Multiplier 0.020     Low target 140 mg/dL    High target 180 mg/dL    D50 order 0.0 ml    D50 administered 0.00 ml    Minutes until next BG 60 min    Order initials jt     Administered initials jt    GLUCOSE, POC    Collection Time: 04/01/19  2:40 AM   Result Value Ref Range    Glucose (POC) 171 (H) 70 - 110 mg/dL   GLUCOSTABILIZER    Collection Time: 04/01/19  2:41 AM   Result Value Ref Range    Glucose 171 mg/dL    Insulin order 2.2 units/hour    Insulin adminstered 2.2 units/hour    Multiplier 0.020     Low target 140 mg/dL    High target 180 mg/dL    D50 order 0.0 ml    D50 administered 0.00 ml    Minutes until next BG 60 min    Order initials sw     Administered initials sw    GLUCOSE, POC    Collection Time: 04/01/19  3:43 AM   Result Value Ref Range    Glucose (POC) 175 (H) 70 - 110 mg/dL   GLUCOSTABILIZER    Collection Time: 04/01/19  3:44 AM   Result Value Ref Range    Glucose 175 mg/dL    Insulin order 2.3 units/hour    Insulin adminstered 2.3 units/hour    Multiplier 0.020     Low target 140 mg/dL    High target 180 mg/dL    D50 order 0.0 ml    D50 administered 0.00 ml    Minutes until next BG 60 min    Order initials SW     Administered initials SW    METABOLIC PANEL, BASIC    Collection Time: 04/01/19  4:28 AM   Result Value Ref Range    Sodium 135 (L) 136 - 145 mmol/L    Potassium 4.8 3.5 - 5.5 mmol/L    Chloride 108 100 - 108 mmol/L    CO2 9 (LL) 21 - 32 mmol/L    Anion gap 18 3.0 - 18 mmol/L    Glucose 193 (H) 74 - 99 mg/dL    BUN 11 7.0 - 18 MG/DL    Creatinine 1.11 0.6 - 1.3 MG/DL    BUN/Creatinine ratio 10 (L) 12 - 20      GFR est AA >60 >60 ml/min/1.73m2    GFR est non-AA >60 >60 ml/min/1.73m2    Calcium 8.5 8.5 - 10.1 MG/DL   GLUCOSE, POC    Collection Time: 04/01/19  4:47 AM   Result Value Ref Range    Glucose (POC) 182 (H) 70 - 110 mg/dL   GLUCOSTABILIZER    Collection Time: 04/01/19  4:47 AM   Result Value Ref Range    Glucose 182 mg/dL    Insulin order 3.7 units/hour    Insulin adminstered 3.7 units/hour    Multiplier 0.030     Low target 140 mg/dL    High target 180 mg/dL    D50 order 0.0 ml    D50 administered 0.00 ml    Minutes until next BG 60 min    Order initials sw     Administered initials sw    GLUCOSE, POC    Collection Time: 04/01/19  5:50 AM   Result Value Ref Range    Glucose (POC) 177 (H) 70 - 110 mg/dL   GLUCOSTABILIZER    Collection Time: 04/01/19  5:51 AM   Result Value Ref Range    Glucose 177 mg/dL    Insulin order 3.5 units/hour    Insulin adminstered 3.5 units/hour    Multiplier 0.030     Low target 140 mg/dL    High target 180 mg/dL    D50 order 0.0 ml    D50 administered 0.00 ml    Minutes until next BG 60 min    Order initials sw     Administered initials sw    GLUCOSE, POC    Collection Time: 04/01/19  6:53 AM   Result Value Ref Range    Glucose (POC) 166 (H) 70 - 110 mg/dL   GLUCOSTABILIZER    Collection Time: 04/01/19  6:54 AM   Result Value Ref Range    Glucose 166 mg/dL    Insulin order 3.2 units/hour    Insulin adminstered 3.2 units/hour    Multiplier 0.030     Low target 140 mg/dL    High target 180 mg/dL    D50 order 0.0 ml    D50 administered 0.00 ml    Minutes until next BG 60 min    Order initials sw     Administered initials sw    GLUCOSE, POC    Collection Time: 04/01/19  7:53 AM   Result Value Ref Range    Glucose (POC) 155 (H) 70 - 110 mg/dL   GLUCOSTABILIZER    Collection Time: 04/01/19  7:56 AM   Result Value Ref Range    Glucose 155 mg/dL    Insulin order 2.9 units/hour    Insulin adminstered 2.9 units/hour    Multiplier 0.030     Low target 140 mg/dL    High target 180 mg/dL    D50 order 0.0 ml    D50 administered 0.00 ml    Minutes until next BG 60 min    Order initials DC     Administered initials DC    METABOLIC PANEL, BASIC    Collection Time: 04/01/19  8:00 AM   Result Value Ref Range    Sodium 138 136 - 145 mmol/L    Potassium 4.8 3.5 - 5.5 mmol/L    Chloride 109 (H) 100 - 108 mmol/L    CO2 17 (L) 21 - 32 mmol/L    Anion gap 12 3.0 - 18 mmol/L    Glucose 178 (H) 74 - 99 mg/dL    BUN 9 7.0 - 18 MG/DL Creatinine 1.19 0.6 - 1.3 MG/DL    BUN/Creatinine ratio 8 (L) 12 - 20      GFR est AA >60 >60 ml/min/1.73m2    GFR est non-AA >60 >60 ml/min/1.73m2    Calcium 8.2 (L) 8.5 - 10.1 MG/DL   GLUCOSE, POC    Collection Time: 04/01/19  9:01 AM   Result Value Ref Range    Glucose (POC) 180 (H) 70 - 110 mg/dL   GLUCOSTABILIZER    Collection Time: 04/01/19  9:05 AM   Result Value Ref Range    Glucose 180 mg/dL    Insulin order 3.6 units/hour    Insulin adminstered 3.6 units/hour    Multiplier 0.030     Low target 140 mg/dL    High target 180 mg/dL    D50 order 0.0 ml    D50 administered 0.00 ml    Minutes until next BG 60 min    Order initials DC     Administered initials DC    GLUCOSE, POC    Collection Time: 04/01/19 10:06 AM   Result Value Ref Range    Glucose (POC) 207 (H) 70 - 110 mg/dL   GLUCOSTABILIZER    Collection Time: 04/01/19 10:08 AM   Result Value Ref Range    Glucose 207 mg/dL    Insulin order 5.9 units/hour    Insulin adminstered 5.9 units/hour    Multiplier 0.040     Low target 140 mg/dL    High target 180 mg/dL    D50 order 0.0 ml    D50 administered 0.00 ml    Minutes until next BG 60 min    Order initials DC     Administered initials DC          Chemistry Recent Labs     04/01/19  0800 04/01/19  0428 04/01/19  0115 03/31/19  2135   * 193* 213* 415*    135* 138 135*   K 4.8 4.8 5.3 4.8   * 108 107 99*   CO2 17* 9* 11* 9*   BUN 9 11 13 16   CREA 1.19 1.11 1.28 1.65*   CA 8.2* 8.5 8.4* 9.5   MG  --   --   --  2.6   PHOS  --   --   --  6.8*   AGAP 12 18 20* 27*   BUCR 8* 10* 10* 10*   AP  --   --   --  159*   TP  --   --   --  8.4*   ALB  --   --   --  4.6   GLOB  --   --   --  3.8   AGRAT  --   --   --  1.2        Lactic Acid No results found for: LAC  No results for input(s): LAC in the last 72 hours.      Liver Enzymes Protein, total   Date Value Ref Range Status   03/31/2019 8.4 (H) 6.4 - 8.2 g/dL Final     Albumin   Date Value Ref Range Status   03/31/2019 4.6 3.4 - 5.0 g/dL Final Globulin   Date Value Ref Range Status   03/31/2019 3.8 2.0 - 4.0 g/dL Final     A-G Ratio   Date Value Ref Range Status   03/31/2019 1.2 0.8 - 1.7   Final     AST (SGOT)   Date Value Ref Range Status   03/31/2019 18 15 - 37 U/L Final     Alk. phosphatase   Date Value Ref Range Status   03/31/2019 159 (H) 45 - 117 U/L Final     Recent Labs     03/31/19  2135   TP 8.4*   ALB 4.6   GLOB 3.8   AGRAT 1.2   SGOT 18   *        CBC w/Diff Recent Labs     03/31/19 2135   WBC 18.0*   RBC 6.09*   HGB 18.0*   HCT 52.7*      GRANS 78*   LYMPH 13*   EOS 0        Cardiac Enzymes Lab Results   Component Value Date/Time     03/31/2019 09:35 PM    CKMB 1.8 03/31/2019 09:35 PM    CKND1 1.6 03/31/2019 09:35 PM    TROIQ <0.02 03/31/2019 09:35 PM        BNP No results found for: BNP, BNPP, XBNPT     Coagulation No results for input(s): PTP, INR, APTT in the last 72 hours.     No lab exists for component: INREXT      Thyroid  No results found for: T4, T3U, TSH, TSHEXT    No results found for: T4     Urinalysis Lab Results   Component Value Date/Time    Color YELLOW 03/31/2019 09:30 PM    Appearance CLEAR 03/31/2019 09:30 PM    Specific gravity 1.030 03/31/2019 09:30 PM    pH (UA) 5.5 03/31/2019 09:30 PM    Protein 100 (A) 03/31/2019 09:30 PM    Glucose >1,000 (A) 03/31/2019 09:30 PM    Ketone >160 (A) 03/31/2019 09:30 PM    Bilirubin NEGATIVE  03/31/2019 09:30 PM    Urobilinogen 0.2 03/31/2019 09:30 PM    Nitrites NEGATIVE  03/31/2019 09:30 PM    Leukocyte Esterase NEGATIVE  03/31/2019 09:30 PM    Epithelial cells 1+ 03/31/2019 09:30 PM    Bacteria 1+ (A) 03/31/2019 09:30 PM    WBC 0 to 3 03/31/2019 09:30 PM    RBC 5 to 10 03/31/2019 09:30 PM        Micro  Recent Labs     03/31/19 2240 03/31/19 2224   CULT NO GROWTH AFTER 10 HOURS NO GROWTH AFTER 10 HOURS     Recent Labs     03/31/19 2240 03/31/19 2224   CULT NO GROWTH AFTER 10 HOURS NO GROWTH AFTER 10 HOURS        ABG Recent Labs     04/01/19  0107   PHI 7.119*   PCO2I 22.9*   PO2I 122*   HCO3I 7.4*   FIO2I 0.21        PFT       Ultrasound       LE Doppler       ECHO       CT (Most Recent) (CT chest reviewed by me) Results from [place] encounter on 03/31/19   CT ABD PELV W CONT    Narrative EXAM: CT of the abdomen and pelvis    INDICATION: Abdominal pain. COMPARISON: None. TECHNIQUE: Axial CT imaging of the abdomen and pelvis was performed with  intravenous contrast. Multiplanar reformats were generated. Dose reduction  techniques used: automated exposure control, adjustment of the mAs and/or kVp  according to patient size, and iterative reconstruction techniques. _______________    FINDINGS:    LOWER CHEST: Unremarkable. LIVER, BILIARY: Liver is normal. No biliary dilation. Gallbladder is  unremarkable. PANCREAS: Normal.    SPLEEN: Normal.    ADRENALS: Normal.    KIDNEYS: Normal.    LYMPH NODES: No enlarged lymph nodes. GASTROINTESTINAL TRACT: No bowel dilation or wall thickening. PELVIC ORGANS: Unremarkable. VASCULATURE: Unremarkable. BONES: No acute or aggressive osseous abnormalities identified. OTHER: None.    _______________      Impression IMPRESSION:    No acute intra-abdominal process. XR (Most Recent). CXR  reviewed by me and compared with previous CXR Results from [place] encounter on 03/31/19   XR CHEST PORT    Narrative ---------------------------------------------------------------------------  <<<<<<<<<           [facility]           >>>>>>>>>   ---------------------------------------------------------------------------    CLINICAL HISTORY:  Tachycardia. COMPARISON EXAMINATIONS:  None. ---  SINGLE FRONTAL VIEW OF THE CHEST  ---    The lungs and pleural spaces are clear. The mediastinum is unremarkable in  appearance.   No significant osseous abnormalities are identified.        --------------    Impression Impression:  --------------    No active pulmonary disease. IMPRESSION:   · DKA  · HAGMA  · Leucocytosis  · Lactic acidosis, resolved  · DON, improving  · ADD  · Seizure d/o    · Code status: full      RECOMMENDATIONS:   C/w insulin drip protocol  C/w bicarb drip. AG and acidosis improving  Advised to comply with insulin  Renal function improving on IVF  Monitor renal function, UOP and electrolytes  Able to eat  Keep SPO2 >=92%. HOB 30 degree elevation all the time. Aggressive pulmonary toileting. Aspiration precautions. Incentive spirometry. On empiric zosyn for elevated lactate and wbc. Lactate normalized. May stop abx soon. Will defer respective systems problem management to primary and other respective consultant and follow patient in ICU with primary and other medical team.  Further recommendations will be based on the patient's response to recommended treatment and results of the investigation ordered. Quality Care: PPI, DVT prophylaxis, HOB elevated, Infection control all reviewed and addressed. Care of plan d/w RN, RT, MDR.  D/w patient (answered all questions to satisfaction). High complexity decision making was performed during the evaluation of this patient at high risk for decompensation with multiple organ involvement. Total critical care time spent rendering care exclusive of procedures: 33 minutes.          Margie Torres MD  4/1/2019

## 2019-04-01 NOTE — H&P
History & Physical 
Patient: Gill Fitzgerald MRN: [id number]  CSN: [id number] YOB: 1995  Age: 24 y.o. Sex: male DOA: 3/31/2019 Primary Care Provider:  Geremias Mujica MD 
 
 
Assessment/Plan Patient Active Problem List  
Diagnosis Code  DKA (diabetic ketoacidoses) ([facility]) E13.10  Renal insufficiency N28.9  Type I diabetes mellitus ([facility] E10.9 1. Diabetic ketoacidosis 2. Type I diabetes mellitus on insulin pump 3. Renal insufficiency 24year old male presents with diabetic ketoacidosis. Patient seen in the emergency department after insulin drip started. Initial BMP drawn at 2135, repeat to be drawn at 0130. Patient reports feeling much better at the time of exam. Admit to ICU for management of diabetic ketoacidosis. HEENT: Mucous membranes are dry on exam. Otherwise no acute abnormalities. CVS: Tachycaric on admission. Electrocardiogram shows sinus tachycardia. Blood pressure stable on exam. Continue to monitor. PULM: Maintaining adequate oxygen saturations on room air. Chest xray without acute cardiopulmonary abnormalities. Good air entry bilaterally. Exam benign. Monitor respiratory rate and oxygen saturation with vitals. GI: Abdominal exam is benign, bowel sounds normal, no tenderness to palpation. Abdominal CT scan negative for acute intra-abdominal abnormalities. Make patient NPO with sips of clears. RENAL: Creatinine slightly elevated on admission at 1.65. Hydrated aggressively, check BMP every 4 hours. Sodium 135, continue to monitor. NEURO: No acute neurological abnormalities. ENDO: DKA; glucose on admission 333. Anion gap 27, CO2 9. Patient started on insulin drip prior to admission. Check glucose every hour with accuchecks. Monitor BMP every 4 hours. Redraw due at 0130. Monitor electrolytes. HEME: White blood cell count elevated on admission at 18.0. No obvious source of infection. CT abdomen benign. Started on empiric Zosyn. ID: No obvious source of infection. Started on empiric Zosyn. SKIN: No obvious signs of breakdown. DVT prophylaxis with subcutaneous heparin. Estimated length of stay: 2-3 days CC: abdominal pain HPI:  
 
Pooja Winters is a 24 y.o. male who has a history of type I diabetes on an insulin pump and depression, who presents to the emergency department with complaints of abdominal pain, nausea, and vomiting for 4 days. He reports one episode of diarrhea. He states he has been wearing his insulin pump, but has not been checking his blood sugars as regularly as he should have been. He states he does not have supplies at home to do so. He denies any ill contacts, denies any headache, chest pain, shortness of breath, dysuria, hematuria, hematochezia, or melena. Past Medical History:  
Diagnosis Date  ADD (attention deficit disorder)  Diabetes ([facility] [address]  Insomnia  Seizure ([facility] History reviewed. No pertinent surgical history. History reviewed. No pertinent family history. Social History Socioeconomic History  Marital status: SINGLE Spouse name: Not on file  Number of children: Not on file  Years of education: Not on file  Highest education level: Not on file Tobacco Use  Smoking status: Never Smoker  Smokeless tobacco: Current User Substance and Sexual Activity  Alcohol use: Yes Alcohol/week: 9.6 oz Types: 10 Cans of beer, 6 Shots of liquor per week  Drug use: Not Currently Prior to Admission medications Medication Sig Start Date End Date Taking? Authorizing Provider  
fluoxetine HCl (PROZAC PO) Take  by mouth. Yes Other, MD Brittni  
RANITIDINE HCL PO Take  by mouth. Yes Other, MD Brittni  
promethazine HCl (PROMETHAZINE PO) Take  by mouth. Yes Other, MD Brittni  
AMPHET ASP/AMPHET/D-AMPHET (ADDERALL PO) Take  by mouth. Yes Other, MD Brittni  
insulin lispro (HUMALOG) 100 unit/mL injection by SubCUTAneous route. Yes Other, MD Brittni  
 insulin pump (PATIENT SUPPLIED) Misc by SubCUTAneous route as needed. Yes Other, MD Brittni  
HYDROcodone-acetaminophen (NORCO) 5-325 mg per tablet Take 1 Tab by mouth every four (4) hours as needed for Pain. 13   Roman Cisneros NP  
nystatin-TCN-HC-diphenhydramin (FIRST-RICCO'S MOUTHWASH) SusR suspension Take 5 mL by mouth every six (6) hours. 13   Roman Cisneros NP No Known Allergies Review of Systems Gen: No fever, chills, malaise, weight loss/gain. Heent: No headache, rhinorrhea, epistaxis, ear pain, hearing loss, sinus pain, neck pain/stiffness, sore throat. Heart: No chest pain, palpitations, FLEMING, pnd, or orthopnea. Resp: No cough, hemoptysis, wheezing and shortness of breath. GI: +nausea, +vomiting, +diarrhea, denies constipation, melena or hematochezia. : No urinary obstruction, dysuria or hematuria. Derm: No rash, new skin lesion or pruritis. Musc/skeletal: no bone or joint complains. Vasc: No edema, cyanosis or claudication. Endo: No heat/cold intolerance, no polyuria,polydipsia or polyphagia. Neuro: No unilateral weakness, numbness, tingling. No seizures. Heme: No easy bruising or bleeding. Physical Exam:  
 
Physical Exam: 
Visit Vitals /77 (BP 1 Location: Left arm, BP Patient Position: At rest) Pulse (!) 115 Temp 98.6 °F (37 °C) Resp 25 Ht 5' 11\" (1.803 m) Wt 73.2 kg (161 lb 6 oz) SpO2 100% BMI 22.51 kg/m² O2 Device: Room air Temp (24hrs), Av.1 °F (36.7 °C), Min:97.4 °F (36.3 °C), Max:98.6 °F (37 °C) 
   1901 -  0700 In: 2370.9 [I.V.:2370.9] Out: 775 [Urine:775]   No intake/output data recorded. General:  Thin white male. Awake, cooperative, no distress. Head:  Normocephalic, without obvious abnormality, atraumatic. Eyes:  Conjunctivae/corneas clear, sclera anicteric, PERRL, EOMs intact. Nose: Nares normal. No drainage or sinus tenderness. Throat: Lips, mucosa, and tongue dry. Neck: Supple, symmetrical, trachea midline, no adenopathy. Lungs:   Clear to auscultation bilaterally. Heart:  Tachycardia, S1, S2 normal, no murmur, click, rub or gallop. Abdomen: Soft, non-tender. Bowel sounds normal. No masses,  No organomegaly. Extremities: Extremities normal, atraumatic, no cyanosis or edema. Capillary refill normal.  
Pulses: 2+ and symmetric all extremities. Skin: Skin color and turgor normal. No rashes or lesions Neurologic: CNII-XII intact. No focal motor or sensory deficit. Labs Reviewed: All lab results for the last 24 hours reviewed. Recent Results (from the past 24 hour(s)) GLUCOSE, POC Collection Time: 03/31/19  9:15 PM  
Result Value Ref Range Glucose (POC) 349 (H) 70 - 110 mg/dL URINALYSIS W/ RFLX MICROSCOPIC Collection Time: 03/31/19  9:30 PM  
Result Value Ref Range Color YELLOW Appearance CLEAR Specific gravity 1.030 1.005 - 1.030    
 pH (UA) 5.5 5.0 - 8.0 Protein 100 (A) NEG mg/dL Glucose >1,000 (A) NEG mg/dL Ketone >160 (A) NEG mg/dL Bilirubin NEGATIVE  NEG Blood SMALL (A) NEG Urobilinogen 0.2 0.2 - 1.0 EU/dL Nitrites NEGATIVE  NEG Leukocyte Esterase NEGATIVE  NEG    
URINE MICROSCOPIC ONLY Collection Time: 03/31/19  9:30 PM  
Result Value Ref Range WBC 0 to 3 0 - 5 /hpf  
 RBC 5 to 10 0 - 5 /hpf Epithelial cells 1+ 0 - 5 /lpf Bacteria 1+ (A) NEG /hpf  
LIPASE Collection Time: 03/31/19  9:35 PM  
Result Value Ref Range Lipase 40 (L) 73 - 139 U/L  
METABOLIC PANEL, COMPREHENSIVE Collection Time: 03/31/19  9:35 PM  
Result Value Ref Range Sodium 135 (L) 136 - 145 mmol/L Potassium 4.8 3.5 - 5.5 mmol/L Chloride 99 (L) 100 - 108 mmol/L  
 CO2 9 (LL) 21 - 32 mmol/L Anion gap 27 (H) 3.0 - 18 mmol/L Glucose 415 (HH) 74 - 99 mg/dL BUN 16 7.0 - 18 MG/DL  Creatinine 1.65 (H) 0.6 - 1.3 MG/DL  
 BUN/Creatinine ratio 10 (L) 12 - 20 GFR est AA >60 >60 ml/min/1.73m2 GFR est non-AA 52 (L) >60 ml/min/1.73m2 Calcium 9.5 8.5 - 10.1 MG/DL Bilirubin, total 1.4 (H) 0.2 - 1.0 MG/DL  
 ALT (SGPT) 37 16 - 61 U/L  
 AST (SGOT) 18 15 - 37 U/L Alk. phosphatase 159 (H) 45 - 117 U/L Protein, total 8.4 (H) 6.4 - 8.2 g/dL Albumin 4.6 3.4 - 5.0 g/dL Globulin 3.8 2.0 - 4.0 g/dL A-G Ratio 1.2 0.8 - 1.7    
CBC WITH AUTOMATED DIFF Collection Time: 03/31/19  9:35 PM  
Result Value Ref Range WBC 18.0 (H) 4.6 - 13.2 K/uL  
 RBC 6.09 (H) 4.70 - 5.50 M/uL  
 HGB 18.0 (H) 13.0 - 16.0 g/dL HCT 52.7 (H) 36.0 - 48.0 % MCV 86.5 74.0 - 97.0 FL  
 MCH 29.6 24.0 - 34.0 PG  
 MCHC 34.2 31.0 - 37.0 g/dL  
 RDW 12.8 11.6 - 14.5 % PLATELET 751 284 - 322 K/uL MPV 10.5 9.2 - 11.8 FL  
 NEUTROPHILS 78 (H) 42 - 75 % BAND NEUTROPHILS 5 0 - 5 % LYMPHOCYTES 13 (L) 20 - 51 % MONOCYTES 4 2 - 9 % EOSINOPHILS 0 0 - 5 % BASOPHILS 0 0 - 3 %  
 ABS. NEUTROPHILS 14.0 (H) 1.8 - 8.0 K/UL  
 ABS. LYMPHOCYTES 2.3 0.8 - 3.5 K/UL  
 ABS. MONOCYTES 0.7 0 - 1.0 K/UL  
 ABS. EOSINOPHILS 0.0 0.0 - 0.4 K/UL  
 ABS. BASOPHILS 0.0 0.0 - 0.1 K/UL  
 RBC COMMENTS NORMOCYTIC, NORMOCHROMIC    
 DF MANUAL CARDIAC PANEL,(CK, CKMB & TROPONIN) Collection Time: 03/31/19  9:35 PM  
Result Value Ref Range  39 - 308 U/L  
 CK - MB 1.8 <3.6 ng/ml CK-MB Index 1.6 0.0 - 4.0 % Troponin-I, QT <0.02 0.0 - 0.045 NG/ML  
MAGNESIUM Collection Time: 03/31/19  9:35 PM  
Result Value Ref Range Magnesium 2.6 1.6 - 2.6 mg/dL PHOSPHORUS Collection Time: 03/31/19  9:35 PM  
Result Value Ref Range Phosphorus 6.8 (H) 2.5 - 4.9 MG/DL  
HEMOGLOBIN A1C WITH EAG Collection Time: 03/31/19  9:35 PM  
Result Value Ref Range Hemoglobin A1c 11.2 (H) 4.2 - 5.6 % Est. average glucose 275 mg/dL EKG, 12 LEAD, INITIAL Collection Time: 03/31/19  9:37 PM  
Result Value Ref Range  Ventricular Rate 118 BPM  
 Atrial Rate 118 BPM  
 P-R Interval 158 ms QRS Duration 98 ms Q-T Interval 318 ms QTC Calculation (Bezet) 445 ms Calculated P Axis 73 degrees Calculated R Axis 83 degrees Calculated T Axis 43 degrees Diagnosis Sinus tachycardia Possible Left atrial enlargement Borderline ECG No previous ECGs available POC LACTIC ACID Collection Time: 03/31/19 10:25 PM  
Result Value Ref Range Lactic Acid (POC) 3.01 (HH) 0.40 - 2.00 mmol/L  
GLUCOSE, POC Collection Time: 03/31/19 11:23 PM  
Result Value Ref Range Glucose (POC) 333 (H) 70 - 110 mg/dL Isaak Leak Collection Time: 03/31/19 11:26 PM  
Result Value Ref Range Glucose 333 mg/dL Insulin order 5.5 units/hour Insulin adminstered 5.5 units/hour Multiplier 0.020 Low target 140 mg/dL High target 180 mg/dL D50 order 0.0 ml  
 D50 administered 0.00 ml Minutes until next BG 60 min Order initials [facility] Administered initials [facility]   
GLUCOSE, POC Collection Time: 04/01/19 12:29 AM  
Result Value Ref Range Glucose (POC) 224 (H) 70 - 110 mg/dL Isaak Leak Collection Time: 04/01/19 12:30 AM  
Result Value Ref Range Glucose 224 mg/dL Insulin order 1.6 units/hour Insulin adminstered 1.6 units/hour Multiplier 0.010 Low target 140 mg/dL High target 180 mg/dL D50 order 0.0 ml  
 D50 administered 0.00 ml Minutes until next BG 60 min Order initials [facility] Administered initials [facility] Procedures/imaging: see electronic medical records for all procedures/Xrays and details which were not copied into this note but were reviewed prior to creation of Plan 40 minutes of critical care time spent in the direct evaluation and treatment of this high risk patient.  The reason for providing this level of medical care for this critically ill patient was due a critical illness that impaired one or more vital organ systems such that there was a high probability of imminent or life threatening deterioration in the patients condition. This care involved high complexity decision making to assess, manipulate, and support vital system functions, to treat this degreee vital organ system failure and to prevent further life threatening deterioration of the patients condition.  
 
 
CC: Cullen Juarez MD

## 2019-04-01 NOTE — DIABETES MGMT
NUTRITIONAL ASSESSMENT GLYCEMIC CONTROL/ PLAN OF CARE Farhat Macias           23 y.o.           3/31/2019 1. Diabetic ketoacidosis without coma associated with type 1 diabetes mellitus ([facility] 2. Nausea and vomiting, intractability of vomiting not specified, unspecified vomiting type 3. Diarrhea, unspecified type 4. Leukocytosis, unspecified type PMHx: Type 1 Diabetes, ADD, Depression, Insomnia, Seizure INTERVENTIONS/PLAN:  
-Recommend continuing Diabetic Consistent Carb Diet. 
-Provide diabetes self-mgt education p/t d/c. ASSESSMENT:  
Nutritional Status: 
Normal weight per BMI 22.5 kg/m2 (18.5-24.9 kg/m2) Diabetes Management:  
Recent Glucose Results:  
Lab Results Component Value Date/Time  (H) 04/01/2019 08:00 AM  
  (H) 04/01/2019 04:28 AM  
  (H) 04/01/2019 01:15 AM  
 GLUCPOC 207 (H) 04/01/2019 10:06 AM  
 GLUCPOC 180 (H) 04/01/2019 09:01 AM  
 GLUCPOC 155 (H) 04/01/2019 07:53 AM  
 
Within target range (non-ICU: <140; ICU<180): [] Yes   []  No 
 
Current Insulin regimen:  
Regular insulin via insulin infusion Home medication/insulin regimen:  
Insulin pump HbA1c:(3/31/19) 11.2% equivalent to average blood glucose level 275mg/dL. Adequate glycemic control PTA:  [] Yes  [x] No 
  
 
SUBJECTIVE/OBJECTIVE: Information obtained from: Pt sleeping soundly at time of visit. RN reports pt ate 100% of breakfast and lunch. Pt remain on insulin drip. Indulin drip currently paused d/t low K. Will follow up tomorrow for full nutrition assessment. Will continue to monitor and follow up per policy. Diet: DIET DIABETIC WITH OPTIONS Patient Vitals for the past 100 hrs: 
 % Diet Eaten 04/01/19 0904 100 % Medications: [x]                Reviewed Most Recent POC Glucose:  
Recent Labs 04/01/19 
0800 04/01/19 
0428 04/01/19 
0115 03/31/19 
2135 * 193* 213* 415* Labs:  
Lab Results Component Value Date/Time Hemoglobin A1c 11.2 (H) 03/31/2019 09:35 PM  
 
Lab Results Component Value Date/Time Sodium 138 04/01/2019 08:00 AM  
 Potassium 4.8 04/01/2019 08:00 AM  
 Chloride 109 (H) 04/01/2019 08:00 AM  
 CO2 17 (L) 04/01/2019 08:00 AM  
 Anion gap 12 04/01/2019 08:00 AM  
 Glucose 178 (H) 04/01/2019 08:00 AM  
 BUN 9 04/01/2019 08:00 AM  
 Creatinine 1.19 04/01/2019 08:00 AM  
 Calcium 8.2 (L) 04/01/2019 08:00 AM  
 Magnesium 2.6 03/31/2019 09:35 PM  
 Phosphorus 6.8 (H) 03/31/2019 09:35 PM  
 Albumin 4.6 03/31/2019 09:35 PM  
 
 
Anthropometrics: BMI (calculated): 22.5 kg/m2 Wt Readings from Last 1 Encounters:  
04/01/19 73.2 kg (161 lb 6 oz) Ht Readings from Last 1 Encounters:  
04/01/19 5' 11\" (1.803 m) Estimated Nutrition Needs: 2167kcal/day (MSJ x 1.25, 73 g PRO/day ( 1 g PRO/kg), 2167 mL fluid/day (1 mL fluid/kcal) Based on:   [x]          Actual BW: 73.2 kg Nutrition Diagnoses: Altered nutrition-related lab value related to pt not wearing insulin pump as evidenced by Hgb A1C 11.2%. Nutrition Interventions: 
-Recommend continuing Diabetic Consistent Carb Diet. Goal:  
Blood glucose will be within target range of  mg/dL by 4/4/19. Nutrition Monitoring and Evaluation []     Monitor po intake on meal rounds 
[x]     Continue inpatient monitoring and intervention 
[]     Other: 
 
 
Nutrition Risk:  []   High     []  Moderate    [x]  Minimal/Uncompromised Marie Apt 
pgr [phone]

## 2019-04-01 NOTE — PROGRESS NOTES
0715: Bedside and Verbal shift change report given to Melia Miles, RN (oncoming nurse) by Micah Sharif. Brianna Glil (offgoing nurse). Report included the following information SBAR, ED Summary, MAR, Recent Results and Cardiac Rhythm ST. 
 
0800: Assessment completed. See EMR.   
 
1200: Reassessment completed. See EMR. 
 
1315: Spoke with Dr. Lorie Gilford regarding patient's drop in potassium while on insulin gtt. Orders to hold insulin gtt x2 hours. Orders to given patient 40 meq po K+ now and 3 runs IV potasium in place of following protocol. Redraw potassium 4 hours post completion of IV runs. 1600: Reassessment completed. See EMR.

## 2019-04-01 NOTE — DIABETES MGMT
GLYCEMIC CONTROL PROGRESS NOTE: 
 
- consult received r/t DKA & pt on Glucostabilizer insulin drip, pt with known h/o DM1 
- discussed in rounds, diet initiated with good po intake 
- recommend continue drip at this point, pt DOES NOT MEET CRITERIA AT THIS TIME to transition to basal/bolus sub-q 
- need two CO2 wnl x 2 lab draws (not met) 
- need Anion gap <12 x 2 lab draws (not met) 
- need drip rate multiplier to be 0.02 and steady or decreasing x 4 hours (not met) 
- need BG to within target range x 4 hours (met) 
- full GC consult to follow Nai Clancy MS, RN, CDE Glycemic Control Team 
[phone] Pager [phone] (M-TH 8:00-4:30P) *After Hours pager [phone]

## 2019-04-01 NOTE — DIABETES MGMT
INSULIN PUMP CONSULT/ Plan Of Care How long have you had diabetes?   T1DM, since 10years of age How long have you had an insulin pump? 8 years Where did you learn how to use it? Endocrinologist office What is your blood glucose target? 140 mg/dl How often do you usually test your blood glucose in a day? Maybe once a day if at all; pt is self pay What was your most recent A1C and date?   11.2 % 3/2019 Who is your endocrinologist?     Dr. Odalis Vaughn When was your last visit to your endocrinologist?  June 2018 INSULIN PUMP Brand of pump and model #:   WhistleTalk Type of insulin used   Humalog Type of infusion set   Does not know What are your basal rate(s)? 12 MN-9AM 1 unit/hr; 9AM-1PM 1.3 units/hr; 1PM-12MN 1.5 units/hr What is your sensitivity factor? 48 What is your insulin to carbohydrate ratio? 1:6  
What is your total daily dose? What conventional insulin dose do you use if you pump were inoperable? (\"off pump plan\")   Humalog injections Do you often have hypoglycemia? Not often How do you treat hypoglycemia? Yahoo milk drink Do you have any site problems? no 
Do you feel able and confident to manage your pump while here?  yes Who helps you manage your insulin pump when you are not able? No one

## 2019-04-01 NOTE — PROGRESS NOTES
Patient seen during morning rounds after early admission. Patient's blood glucose levels have improved. Changed from NS to D5/NS. Continue to monitor BMP every 4 hours. Bicarb is low on most recent BMP, Placed patient on sodium bicarb infusion, 50mEq in D5. Once bicarb improves, will discontinue. Continue glucostabilizer with accuchecks every hour. Adjust insulin rate per protocol. CBC still pending at time of exam. 
Patient may have diabetic diet this morning. Will remain available throughout the day for questions and concerns.

## 2019-04-01 NOTE — PROGRESS NOTES
[facility] Medic Code Sepsis - Time of Code Sepsis: Peyman Hansen 
- Team: 
Physician Thony Hinkle Bedside Nurse Lizz Ritter Medic Arpan Lowe Supervisor  [employer] - SIRS # 1 HR  SIRS # 2 WBC 18 
- Possible source of infection: DKA - Blood Cultures collected times 2 OR collected within last 24 hours:  2225/ 2242 
- Lactic Acid Collection time OR within last 6 hours: 2225 - Antibiotic Start time: 0025  Zosyn started - Lactic Acid Result: 3.01 
- Target Fluid Bolus Amount: 2,301 Liters - Time of Fluid Bolus initiated: 2232 - New PIV / or line: 2 PIV 
 
-  Time of Fluid Bolus Completion: - Time of Repeat Lactic Acid:0013 
- Repeat Lactic Acid Result: .94 
- Vasopressors Needed? No 
- Debriefed with RN: Pt being transferred to ICU bed 7  
- Transfer to higher level of care?  Transferred to ICU

## 2019-04-01 NOTE — ED TRIAGE NOTES
Pt to ED via EMS for abdominal pain and vomiting x 10 days. Pt was seen at PCP for this, still not getting better. Was placed on Rx, unknown name of medication. Pt hx type 1 DM. Last glucose in the 290's. Pt A&O x 4, able to speak in full, complete sentences to make needs known.

## 2019-04-01 NOTE — PROGRESS NOTES
0025 Completed report, via telephone, with Deedee Greenberg RN. We reviewed SBAR, labs, meds, and plan of care for pt. 
0035 Pt arrived on unit and was safely transferred to ICU bed 6 
0045 Admission assessment completed and CHG bath completed 0110 Pt c/o worsening nausea, advised to only take small sips of water and will give PRN zofran 
0410 Reassessed pt 
0700 Completed shift report and transferred care to OSCAR Carlos RN. We reviewed SBAR, labs, meds, and plan of care.

## 2019-04-01 NOTE — PROGRESS NOTES
Reason for Admission:  C/o abd pain, nausea and vomiting RRAT Score:  4 Plan for utilizing home health:   TBD Current Advanced Directive/Advance Care Plan:  Not on chart will discuss with physician if palliative consult is needed. Likelihood of Readmission:  no 
                      
Transition of Care Plan:   Chart reviewed noted from ED noted pt arrived via EMS with c/o abd pain with nausea and vomiting,hx type 1 diabetes,seizures,ADD, pt admitted to ICU for DKA, cm will cont to review and attend IDR's, will initiate d/c planning. 1230- met with pt after IDR's, cm role as discharge planner explained, pt states he lives with his father, has been going to [facility], patients medicaid recently approved for approx 1 week now which pt states he will be returning back to his pcp now that he has insurance and will follow thru with his endocrinologist Sera Hearn when discharged.

## 2019-04-01 NOTE — DIABETES MGMT
Glycemic Control Attempted to revisit pt. Pt still sleeping soundly. Will monitor and follow up per policy. [place], RD Pager [phone]

## 2019-04-01 NOTE — PROGRESS NOTES
1915 Completed shift report and assumed care from OSCAR Cowart RN. We reviewed SBAR, labs, meds, and plan of care. 1930 Completed shift assessment 1950 Noted pt's current lab values have improved (CO2 WNL and anion gap 6), will replace potassium and continue to monitor. 2345 Reassessed pt, will replace potassium and continue to monitor electrolyte and lab values. 0400 Reassessed pt 
0515 Completed report, via telephone, with Scotty Porter RN. We reviewed SBAR, labs, meds, and plan of care. 0550 Pt brought to room 327 safely, via WC.

## 2019-04-01 NOTE — PROGRESS NOTES
Zosyn (Piperacillin/Tazobactam) Extended Infusion This patient has been converted to an extended infusion of Zosyn while in the intensive care unit. Extended infusions will run over 4 hours (240 minutes). Scr = 1.13   CrCl > 100 ml/min Dose adjusted to:  Zosyn 3.375 grams IV q8h Pharmacy to continue to monitor patient daily. Signed Presley Albright information:  [phone]

## 2019-04-01 NOTE — ED NOTES
TRANSFER - OUT REPORT: 
 
Verbal report given to [address] [place] [address] [facility] [address]  being transferred to ICU for routine progression of care Report consisted of patients Situation, Background, Assessment and  
Recommendations(SBAR). Information from the following report(s) SBAR, ED Summary, Intake/Output, MAR and Recent Results was reviewed with the receiving nurse. Lines:  
Peripheral IV 03/31/19 Right; Anterior Antecubital (Active) Site Assessment Clean, dry, & intact 3/31/2019  9:15 PM  
Phlebitis Assessment 0 3/31/2019  9:15 PM  
Infiltration Assessment 0 3/31/2019  9:15 PM  
Dressing Status Clean, dry, & intact 3/31/2019  9:15 PM  
Dressing Type Tape;Transparent 3/31/2019  9:15 PM  
Hub Color/Line Status Pink;Flushed 3/31/2019  9:15 PM  
   
Peripheral IV 03/31/19 Left Antecubital (Active) Site Assessment Clean, dry, & intact 3/31/2019 10:51 PM  
Phlebitis Assessment 0 3/31/2019 10:51 PM  
Infiltration Assessment 0 3/31/2019 10:51 PM  
Dressing Status Clean, dry, & intact 3/31/2019 10:51 PM  
Dressing Type Transparent 3/31/2019 10:51 PM  
  
 
Opportunity for questions and clarification was provided. Patient transported with: 
 Monitor Registered Nurse

## 2019-04-01 NOTE — ED NOTES
Patient presents to the ED with c/o nausea, vomiting and diarrhea on and off for the past 10 days. Patient is a type one diabetic.

## 2019-04-01 NOTE — ED PROVIDER NOTES
EMERGENCY DEPARTMENT HISTORY AND PHYSICAL EXAM 
 
Date: 3/31/2019 Patient Name: Naveen Willis History of Presenting Illness Chief Complaint Patient presents with  Abdominal Pain  Vomiting History Provided By: Patient Chief Complaint: vomiting Additional History (Context):  
9:25 PM 
Naveen Willis is a 24 y.o. male with PMHX DM, type 1, has insulin pump, presents to the emergency department C/O nausea and vomiting for over a week. He had diarrhea 4 days ago now resolved. Complaining of some upper abdominal pain. He denies fever, change in urine, shortness of breath, chest pain. He denies any history of abdominal surgeries. States he has been in DKA in the past but states this does not feel similar. PCP: Brannon Garcia MD 
 
Current Facility-Administered Medications Medication Dose Route Frequency Provider Last Rate Last Dose  
 0.9% sodium chloride infusion  150 mL/hr IntraVENous CONTINUOUS Dane Samaniego PA-C 150 mL/hr at 04/01/19 0039 150 mL/hr at 04/01/19 0039  
 ondansetron (ZOFRAN) injection 4 mg  4 mg IntraVENous Q4H PRN Dane Samaniego PA-C   4 mg at 04/01/19 0116  
 heparin (porcine) injection 5,000 Units  5,000 Units SubCUTAneous Q8H Dane Samaniego PA-C   5,000 Units at 04/01/19 0110  ELECTROLYTE REPLACEMENT PROTOCOL- Potassium   1 Each Other PRN Dane Samaniego PA-C      
 ELECTROLYTE REPLACEMENT PROTOCOL- Magnesium   1 Each Other PRN Dane Samaniego PA-C      
 ELECTROLYTE REPLACEMENT PROTOCOL- Calcium   1 Each Other PRN Dane Samaniego PA-C      
 sodium chloride (NS) flush 5-10 mL  5-10 mL IntraVENous PRN Christy Donnelly PA      
 piperacillin-tazobactam (ZOSYN) 3.375 g in 0.9% sodium chloride (MBP/ADV) 100 mL MBP  3.375 g IntraVENous Q6H Christy Donnelly [place]   Stopped at 03/31/19 8228  
 insulin regular (NOVOLIN R, HUMULIN R) 100 Units in 0.9% sodium chloride 100 mL infusion  0-50 Units/hr IntraVENous TITRATE NONA Gay 2.5 mL/hr at 04/01/19 0139 2.5 Units/hr at 04/01/19 0139  
 glucose chewable tablet 16 g  4 Tab Oral PRN NONA Gay      
 glucagon (GLUCAGEN) injection 1 mg  1 mg IntraMUSCular PRN NONA Gay      
 dextrose (D50W) injection syrg 12.5-25 g  25-50 mL IntraVENous PRN Jenet Canavan M, PA Past History Past Medical History: 
Past Medical History:  
Diagnosis Date  ADD (attention deficit disorder)  Diabetes ([facility]  Insomnia  Seizure ([facility] [address] Past Surgical History: 
History reviewed. No pertinent surgical history. Family History: 
History reviewed. No pertinent family history. Social History: 
Social History Tobacco Use  Smoking status: Never Smoker  Smokeless tobacco: Current User Substance Use Topics  Alcohol use: Yes Alcohol/week: 9.6 oz Types: 10 Cans of beer, 6 Shots of liquor per week  Drug use: Not Currently Allergies: 
No Known Allergies Review of Systems Review of Systems Constitutional: Negative for chills and fever. Respiratory: Negative for shortness of breath. Cardiovascular: Negative for chest pain. Gastrointestinal: Positive for abdominal pain, diarrhea, nausea and vomiting. Genitourinary: Negative for decreased urine volume. Physical Exam  
 
Vitals:  
 04/01/19 0027 04/01/19 0045 04/01/19 0100 04/01/19 0115 BP: 143/79 133/77 138/80 137/79 Pulse: (!) 122 (!) 115 (!) 111 (!) 110 Resp: 22 25 23 22 Temp:  98.6 °F (37 °C) SpO2: 99% 100% 100% 100% Weight:  73.2 kg (161 lb 6 oz) Height:  5' 11\" (1.803 m) Physical Exam  
Constitutional: He is oriented to person, place, and time. He appears well-developed and well-nourished. Thin, appears tired, lying on stretcher HENT:  
Head: Normocephalic and atraumatic. Dry mucous membranes, tongue bright orange Neck: Normal range of motion. Neck supple. Cardiovascular: Regular rhythm, normal heart sounds and intact distal pulses. Tachycardia present. No murmur heard. Pulmonary/Chest: Effort normal and breath sounds normal. No respiratory distress. He has no wheezes. He has no rales. Abdominal: Soft. Bowel sounds are normal. There is tenderness in the epigastric area. Neurological: He is alert and oriented to person, place, and time. Skin: Skin is warm and dry. Psychiatric: He has a normal mood and affect. Judgment normal.  
Nursing note and vitals reviewed. Diagnostic Study Results Labs: 
  
Recent Results (from the past 12 hour(s)) GLUCOSE, POC Collection Time: 03/31/19  9:15 PM  
Result Value Ref Range Glucose (POC) 349 (H) 70 - 110 mg/dL URINALYSIS W/ RFLX MICROSCOPIC Collection Time: 03/31/19  9:30 PM  
Result Value Ref Range Color YELLOW Appearance CLEAR Specific gravity 1.030 1.005 - 1.030    
 pH (UA) 5.5 5.0 - 8.0 Protein 100 (A) NEG mg/dL Glucose >1,000 (A) NEG mg/dL Ketone >160 (A) NEG mg/dL Bilirubin NEGATIVE  NEG Blood SMALL (A) NEG Urobilinogen 0.2 0.2 - 1.0 EU/dL Nitrites NEGATIVE  NEG Leukocyte Esterase NEGATIVE  NEG    
URINE MICROSCOPIC ONLY Collection Time: 03/31/19  9:30 PM  
Result Value Ref Range WBC 0 to 3 0 - 5 /hpf  
 RBC 5 to 10 0 - 5 /hpf Epithelial cells 1+ 0 - 5 /lpf Bacteria 1+ (A) NEG /hpf  
LIPASE Collection Time: 03/31/19  9:35 PM  
Result Value Ref Range Lipase 40 (L) 73 - 643 U/L  
METABOLIC PANEL, COMPREHENSIVE Collection Time: 03/31/19  9:35 PM  
Result Value Ref Range Sodium 135 (L) 136 - 145 mmol/L Potassium 4.8 3.5 - 5.5 mmol/L Chloride 99 (L) 100 - 108 mmol/L  
 CO2 9 (LL) 21 - 32 mmol/L Anion gap 27 (H) 3.0 - 18 mmol/L Glucose 415 (HH) 74 - 99 mg/dL BUN 16 7.0 - 18 MG/DL Creatinine 1.65 (H) 0.6 - 1.3 MG/DL  
 BUN/Creatinine ratio 10 (L) 12 - 20 GFR est AA >60 >60 ml/min/1.73m2 GFR est non-AA 52 (L) >60 ml/min/1.73m2 Calcium 9.5 8.5 - 10.1 MG/DL Bilirubin, total 1.4 (H) 0.2 - 1.0 MG/DL  
 ALT (SGPT) 37 16 - 61 U/L  
 AST (SGOT) 18 15 - 37 U/L Alk. phosphatase 159 (H) 45 - 117 U/L Protein, total 8.4 (H) 6.4 - 8.2 g/dL Albumin 4.6 3.4 - 5.0 g/dL Globulin 3.8 2.0 - 4.0 g/dL A-G Ratio 1.2 0.8 - 1.7    
CBC WITH AUTOMATED DIFF Collection Time: 03/31/19  9:35 PM  
Result Value Ref Range WBC 18.0 (H) 4.6 - 13.2 K/uL  
 RBC 6.09 (H) 4.70 - 5.50 M/uL  
 HGB 18.0 (H) 13.0 - 16.0 g/dL HCT 52.7 (H) 36.0 - 48.0 % MCV 86.5 74.0 - 97.0 FL  
 MCH 29.6 24.0 - 34.0 PG  
 MCHC 34.2 31.0 - 37.0 g/dL  
 RDW 12.8 11.6 - 14.5 % PLATELET 535 349 - 785 K/uL MPV 10.5 9.2 - 11.8 FL  
 NEUTROPHILS 78 (H) 42 - 75 % BAND NEUTROPHILS 5 0 - 5 % LYMPHOCYTES 13 (L) 20 - 51 % MONOCYTES 4 2 - 9 % EOSINOPHILS 0 0 - 5 % BASOPHILS 0 0 - 3 %  
 ABS. NEUTROPHILS 14.0 (H) 1.8 - 8.0 K/UL  
 ABS. LYMPHOCYTES 2.3 0.8 - 3.5 K/UL  
 ABS. MONOCYTES 0.7 0 - 1.0 K/UL  
 ABS. EOSINOPHILS 0.0 0.0 - 0.4 K/UL  
 ABS. BASOPHILS 0.0 0.0 - 0.1 K/UL  
 RBC COMMENTS NORMOCYTIC, NORMOCHROMIC    
 DF MANUAL CARDIAC PANEL,(CK, CKMB & TROPONIN) Collection Time: 03/31/19  9:35 PM  
Result Value Ref Range  39 - 308 U/L  
 CK - MB 1.8 <3.6 ng/ml CK-MB Index 1.6 0.0 - 4.0 % Troponin-I, QT <0.02 0.0 - 0.045 NG/ML  
MAGNESIUM Collection Time: 03/31/19  9:35 PM  
Result Value Ref Range Magnesium 2.6 1.6 - 2.6 mg/dL PHOSPHORUS Collection Time: 03/31/19  9:35 PM  
Result Value Ref Range Phosphorus 6.8 (H) 2.5 - 4.9 MG/DL  
HEMOGLOBIN A1C WITH EAG Collection Time: 03/31/19  9:35 PM  
Result Value Ref Range Hemoglobin A1c 11.2 (H) 4.2 - 5.6 % Est. average glucose 275 mg/dL EKG, 12 LEAD, INITIAL Collection Time: 03/31/19  9:37 PM  
Result Value Ref Range Ventricular Rate 118 BPM  
 Atrial Rate 118 BPM  
 P-R Interval 158 ms QRS Duration 98 ms Q-T Interval 318 ms QTC Calculation (Bezet) 445 ms Calculated P Axis 73 degrees Calculated R Axis 83 degrees Calculated T Axis 43 degrees Diagnosis Sinus tachycardia Possible Left atrial enlargement Borderline ECG No previous ECGs available POC LACTIC ACID Collection Time: 03/31/19 10:25 PM  
Result Value Ref Range Lactic Acid (POC) 3.01 (HH) 0.40 - 2.00 mmol/L  
GLUCOSE, POC Collection Time: 03/31/19 11:23 PM  
Result Value Ref Range Glucose (POC) 333 (H) 70 - 110 mg/dL [facility] Collection Time: 03/31/19 11:26 PM  
Result Value Ref Range Glucose 333 mg/dL Insulin order 5.5 units/hour Insulin adminstered 5.5 units/hour Multiplier 0.020 Low target 140 mg/dL High target 180 mg/dL D50 order 0.0 ml  
 D50 administered 0.00 ml Minutes until next BG 60 min Order initials [facility] Administered initials [facility] POC LACTIC ACID Collection Time: 04/01/19 12:12 AM  
Result Value Ref Range Lactic Acid (POC) 0.94 0.40 - 2.00 mmol/L  
GLUCOSE, POC Collection Time: 04/01/19 12:29 AM  
Result Value Ref Range Glucose (POC) 224 (H) 70 - 110 mg/dL [facility] [place] [facility] Collection Time: 04/01/19 12:30 AM  
Result Value Ref Range Glucose 224 mg/dL Insulin order 1.6 units/hour Insulin adminstered 1.6 units/hour Multiplier 0.010 Low target 140 mg/dL High target 180 mg/dL D50 order 0.0 ml  
 D50 administered 0.00 ml Minutes until next BG 60 min Order initials [facility] Administered initials [facility]   
POC G3 Collection Time: 04/01/19  1:07 AM  
Result Value Ref Range Device: ROOM AIR    
 FIO2 (POC) 0.21 % pH (POC) 7.119 (LL) 7.35 - 7.45    
 pCO2 (POC) 22.9 (L) 35.0 - 45.0 MMHG  
 pO2 (POC) 122 (H) 80 - 100 MMHG  
 HCO3 (POC) 7.4 (L) 22 - 26 MMOL/L  
 sO2 (POC) 97 92 - 97 % Base deficit (POC) 22 mmol/L Allens test (POC) N/A Total resp. rate 26 Site RIGHT BRACHIAL Patient temp. 98.6 Specimen type (POC) ARTERIAL Performed by Whitney Salazar GLUCOSE, POC Collection Time: 04/01/19  1:36 AM  
Result Value Ref Range Glucose (POC) 185 (H) 70 - 110 mg/dL Deanne Hesson Collection Time: 04/01/19  1:37 AM  
Result Value Ref Range Glucose 185 mg/dL Insulin order 2.5 units/hour Insulin adminstered 2.5 units/hour Multiplier 0.020 Low target 140 mg/dL High target 180 mg/dL D50 order 0.0 ml  
 D50 administered 0.00 ml Minutes until next BG 60 min Order initials jt Administered initials jt Radiologic Studies:  
CT ABD PELV W CONT Final Result IMPRESSION:  
  
No acute intra-abdominal process. XR CHEST PORT Final Result Impression:  
-------------- No active pulmonary disease. CT Results  (Last 48 hours) 03/31/19 2317  CT ABD PELV W CONT Final result Impression:  IMPRESSION:  
   
No acute intra-abdominal process. Narrative:  EXAM: CT of the abdomen and pelvis INDICATION: Abdominal pain. COMPARISON: None. TECHNIQUE: Axial CT imaging of the abdomen and pelvis was performed with  
intravenous contrast. Multiplanar reformats were generated. Dose reduction  
techniques used: automated exposure control, adjustment of the mAs and/or kVp  
according to patient size, and iterative reconstruction techniques. _______________ FINDINGS:  
   
LOWER CHEST: Unremarkable. LIVER, BILIARY: Liver is normal. No biliary dilation. Gallbladder is  
unremarkable. PANCREAS: Normal.  
   
SPLEEN: Normal.  
   
ADRENALS: Normal.  
   
KIDNEYS: Normal.  
   
LYMPH NODES: No enlarged lymph nodes. GASTROINTESTINAL TRACT: No bowel dilation or wall thickening. PELVIC ORGANS: Unremarkable. VASCULATURE: Unremarkable. BONES: No acute or aggressive osseous abnormalities identified. OTHER: None.  
   
_______________ CXR Results  (Last 48 hours) 03/31/19 2255  XR CHEST PORT Final result Impression:  Impression:  
-------------- No active pulmonary disease. Narrative:  ---------------------------------------------------------------------------  
<<<<<<<<<           [facility]           >>>>>>>>>   
--------------------------------------------------------------------------- CLINICAL HISTORY:  Tachycardia. COMPARISON EXAMINATIONS:  None. ---  SINGLE FRONTAL VIEW OF THE CHEST  --- The lungs and pleural spaces are clear. The mediastinum is unremarkable in  
appearance. No significant osseous abnormalities are identified.    
   
   
--------------  
  
  
 
 
10:59 PM 
RADIOLOGY FINDINGS 
chest X-ray shows NAP Pending review by Radiologist 
Recorded by Serg Queen PA-C Medical Decision Making I am the first provider for this patient. I reviewed the vital signs, available nursing notes, past medical history, past surgical history, family history and social history. Vital Signs: Reviewed the patient's vital signs. Pulse Oximetry Analysis: 96% on RA Cardiac Monitor: 
Rate: 122 bpm 
Rhythm: NSR  
 
EKG interpretation: (Preliminary) 9:25 PM  
Sinus tachy rate 118. No STEMI. EKG read by Serg Queen PA-C at 9:37 PM 
 
Records Reviewed: Nursing Notes and Old Medical Records Procedures: 
Procedures ED Course:  
9:25 PM Initial assessment performed. The patients presenting problems have been discussed, and they are in agreement with the care plan formulated and outlined with them. I have encouraged them to ask questions as they arise throughout their visit. 10:15 PM 
Pt meets SIRS. Will obtain lactic acid and blood cultures. 11:15 PM 
FACE-TO-FACE PROGRESS NOTE: 
11:15 PM 
The patient presents with several days of diarrhea vomiting and nonspecific abdominal pain. My exam shows mildly tender abdomen with nonfocal tenderness.   Cardiac smells of ketones. He is sick or toxic appearing vital signs are stable despite tachycardia. .. Imp/plan: Diabetes ketoacidosis likely secondary to diarrhea and infection. He is tachycardic and improving during treatment. This does not look to be sepsis. I have personally seen and examined the patient, reviewed the GENARO's note and agree with findings and plan. Written by Mingo Arora MD. 
 
12:07 AM Discussed patient's history, exam, and available diagnostics results with Jenniffer Garcia PA-C, hopsitalist, who agree with admission to ICU. Discussion: Pt with hx of DM presents with abdominal pain nausea vomiting and diarrhea. He was tachycardic on arrival had elevated white count and lactic acid. Sepsis protocol initiated. Antibiotics and fluids given. No evidence of infection found however. Patient was admitted for DKA. Lactic and heart rate improved with hydration. Glucose stabilizer initiated. Strict return precautions given, pt offering no questions or complaints. Diagnosis and Disposition Critical Care Time: 45 mins Core Measures: 
For Hospitalized Patients: 
 
1. Hospitalization Decision Time: The decision to hospitalize the patient was made by Ceci Headley PA-C at 12:08 AM on 3/31/2019 2. Aspirin: Aspirin was not given because the patient did not present with a stroke at the time of their Emergency Department evaluation 12:08 AM  Patient is being admitted to the hospital by Dr. Kinza Michelle MD. The results of their tests and reasons for their admission have been discussed with them and/or available family. They convey agreement and understanding for the need to be admitted and for their admission diagnosis. CONDITIONS ON ADMISSION: 
Sepsis is not present at the time of admission. Deep Vein Thrombosis is not present at the time of admission. Thrombosis is not present at the time of admission.  Urinary Tract Infection is not present at the time of admission. Pneumonia is not present at the time of admission. MRSA is not present at the time of admission. Wound infection is not present at the time of admission. Pressure Ulcer is not present at the time of admission. CLINICAL IMPRESSION: 
 
1. Diabetic ketoacidosis without coma associated with type 1 diabetes mellitus ([facility] [address] 2. Nausea and vomiting, intractability of vomiting not specified, unspecified vomiting type 3. Diarrhea, unspecified type 4. Leukocytosis, unspecified type PLAN: 
1. Admit to [facility] I was personally available for consultation in the emergency department. I have reviewed the chart prior to the patient's discharge and agree with the documentation recorded by the [facility], including the assessment, treatment plan, and disposition.

## 2019-04-02 LAB
ADMINISTERED INITIALS, ADMINIT: NORMAL
ANION GAP SERPL CALC-SCNC: 6 MMOL/L (ref 3–18)
ANION GAP SERPL CALC-SCNC: 8 MMOL/L (ref 3–18)
BASOPHILS # BLD: 0 K/UL (ref 0–0.1)
BASOPHILS NFR BLD: 0 % (ref 0–2)
BUN SERPL-MCNC: 5 MG/DL (ref 7–18)
BUN SERPL-MCNC: 6 MG/DL (ref 7–18)
BUN/CREAT SERPL: 7 (ref 12–20)
BUN/CREAT SERPL: 7 (ref 12–20)
CA-I SERPL-SCNC: 1.12 MMOL/L (ref 1.12–1.32)
CALCIUM SERPL-MCNC: 7.8 MG/DL (ref 8.5–10.1)
CALCIUM SERPL-MCNC: 7.9 MG/DL (ref 8.5–10.1)
CHLORIDE SERPL-SCNC: 108 MMOL/L (ref 100–108)
CHLORIDE SERPL-SCNC: 111 MMOL/L (ref 100–108)
CO2 SERPL-SCNC: 22 MMOL/L (ref 21–32)
CO2 SERPL-SCNC: 24 MMOL/L (ref 21–32)
CREAT SERPL-MCNC: 0.74 MG/DL (ref 0.6–1.3)
CREAT SERPL-MCNC: 0.81 MG/DL (ref 0.6–1.3)
D50 ADMINISTERED, D50ADM: 0 ML
D50 ORDER, D50ORD: 0 ML
DIFFERENTIAL METHOD BLD: ABNORMAL
EOSINOPHIL # BLD: 0 K/UL (ref 0–0.4)
EOSINOPHIL NFR BLD: 0 % (ref 0–5)
ERYTHROCYTE [DISTWIDTH] IN BLOOD BY AUTOMATED COUNT: 12.9 % (ref 11.6–14.5)
ERYTHROCYTE [DISTWIDTH] IN BLOOD BY AUTOMATED COUNT: 12.9 % (ref 11.6–14.5)
GLUCOSE BLD STRIP.AUTO-MCNC: 103 MG/DL (ref 70–110)
GLUCOSE BLD STRIP.AUTO-MCNC: 113 MG/DL (ref 70–110)
GLUCOSE BLD STRIP.AUTO-MCNC: 115 MG/DL (ref 70–110)
GLUCOSE BLD STRIP.AUTO-MCNC: 155 MG/DL (ref 70–110)
GLUCOSE BLD STRIP.AUTO-MCNC: 171 MG/DL (ref 70–110)
GLUCOSE BLD STRIP.AUTO-MCNC: 173 MG/DL (ref 70–110)
GLUCOSE BLD STRIP.AUTO-MCNC: 201 MG/DL (ref 70–110)
GLUCOSE BLD STRIP.AUTO-MCNC: 269 MG/DL (ref 70–110)
GLUCOSE BLD STRIP.AUTO-MCNC: 299 MG/DL (ref 70–110)
GLUCOSE SERPL-MCNC: 112 MG/DL (ref 74–99)
GLUCOSE SERPL-MCNC: 203 MG/DL (ref 74–99)
GLUCOSE, GLC: 103 MG/DL
GLUCOSE, GLC: 113 MG/DL
GLUCOSE, GLC: 115 MG/DL
GLUCOSE, GLC: 155 MG/DL
GLUCOSE, GLC: 171 MG/DL
HCT VFR BLD AUTO: 40.9 % (ref 36–48)
HCT VFR BLD AUTO: 41.4 % (ref 36–48)
HGB BLD-MCNC: 14.2 G/DL (ref 13–16)
HGB BLD-MCNC: 14.3 G/DL (ref 13–16)
HIGH TARGET, HITG: 180 MG/DL
INSULIN ADMINSTERED, INSADM: 0.8 UNITS/HOUR
INSULIN ADMINSTERED, INSADM: 1.5 UNITS/HOUR
INSULIN ADMINSTERED, INSADM: 2 UNITS/HOUR
INSULIN ADMINSTERED, INSADM: 4.6 UNITS/HOUR
INSULIN ADMINSTERED, INSADM: 5.3 UNITS/HOUR
INSULIN ORDER, INSORD: 0.8 UNITS/HOUR
INSULIN ORDER, INSORD: 1.5 UNITS/HOUR
INSULIN ORDER, INSORD: 2 UNITS/HOUR
INSULIN ORDER, INSORD: 4.6 UNITS/HOUR
INSULIN ORDER, INSORD: 5.3 UNITS/HOUR
LOW TARGET, LOT: 140 MG/DL
LYMPHOCYTES # BLD: 1.9 K/UL (ref 0.9–3.6)
LYMPHOCYTES NFR BLD: 25 % (ref 21–52)
MAGNESIUM SERPL-MCNC: 1.7 MG/DL (ref 1.6–2.6)
MAGNESIUM SERPL-MCNC: 2 MG/DL (ref 1.6–2.6)
MCH RBC QN AUTO: 28.6 PG (ref 24–34)
MCH RBC QN AUTO: 28.9 PG (ref 24–34)
MCHC RBC AUTO-ENTMCNC: 34.3 G/DL (ref 31–37)
MCHC RBC AUTO-ENTMCNC: 35 G/DL (ref 31–37)
MCV RBC AUTO: 82.8 FL (ref 74–97)
MCV RBC AUTO: 83.3 FL (ref 74–97)
MINUTES UNTIL NEXT BG, NBG: 60 MIN
MONOCYTES # BLD: 0.9 K/UL (ref 0.05–1.2)
MONOCYTES NFR BLD: 12 % (ref 3–10)
MULTIPLIER, MUL: 0.02
MULTIPLIER, MUL: 0.03
MULTIPLIER, MUL: 0.04
MULTIPLIER, MUL: 0.05
MULTIPLIER, MUL: 0.05
NEUTS SEG # BLD: 4.8 K/UL (ref 1.8–8)
NEUTS SEG NFR BLD: 63 % (ref 40–73)
ORDER INITIALS, ORDINIT: NORMAL
PLATELET # BLD AUTO: 216 K/UL (ref 135–420)
PLATELET # BLD AUTO: 224 K/UL (ref 135–420)
PMV BLD AUTO: 9.7 FL (ref 9.2–11.8)
PMV BLD AUTO: 9.9 FL (ref 9.2–11.8)
POTASSIUM SERPL-SCNC: 3.3 MMOL/L (ref 3.5–5.5)
POTASSIUM SERPL-SCNC: 3.4 MMOL/L (ref 3.5–5.5)
POTASSIUM SERPL-SCNC: 3.5 MMOL/L (ref 3.5–5.5)
RBC # BLD AUTO: 4.94 M/UL (ref 4.7–5.5)
RBC # BLD AUTO: 4.97 M/UL (ref 4.7–5.5)
SODIUM SERPL-SCNC: 138 MMOL/L (ref 136–145)
SODIUM SERPL-SCNC: 141 MMOL/L (ref 136–145)
WBC # BLD AUTO: 7.2 K/UL (ref 4.6–13.2)
WBC # BLD AUTO: 7.7 K/UL (ref 4.6–13.2)

## 2019-04-02 PROCEDURE — 84132 ASSAY OF SERUM POTASSIUM: CPT

## 2019-04-02 PROCEDURE — 74011250637 HC RX REV CODE- 250/637: Performed by: HOSPITALIST

## 2019-04-02 PROCEDURE — 85025 COMPLETE CBC W/AUTO DIFF WBC: CPT

## 2019-04-02 PROCEDURE — 85027 COMPLETE CBC AUTOMATED: CPT

## 2019-04-02 PROCEDURE — 83735 ASSAY OF MAGNESIUM: CPT

## 2019-04-02 PROCEDURE — 74011250636 HC RX REV CODE- 250/636: Performed by: PHYSICIAN ASSISTANT

## 2019-04-02 PROCEDURE — 74011250636 HC RX REV CODE- 250/636: Performed by: INTERNAL MEDICINE

## 2019-04-02 PROCEDURE — 74011250637 HC RX REV CODE- 250/637: Performed by: PHYSICIAN ASSISTANT

## 2019-04-02 PROCEDURE — 36415 COLL VENOUS BLD VENIPUNCTURE: CPT

## 2019-04-02 PROCEDURE — 74011000258 HC RX REV CODE- 258: Performed by: HOSPITALIST

## 2019-04-02 PROCEDURE — 82330 ASSAY OF CALCIUM: CPT

## 2019-04-02 PROCEDURE — 82962 GLUCOSE BLOOD TEST: CPT

## 2019-04-02 PROCEDURE — 74011636637 HC RX REV CODE- 636/637: Performed by: HOSPITALIST

## 2019-04-02 PROCEDURE — 65270000029 HC RM PRIVATE

## 2019-04-02 PROCEDURE — 74011000258 HC RX REV CODE- 258: Performed by: PHYSICIAN ASSISTANT

## 2019-04-02 PROCEDURE — 80048 BASIC METABOLIC PNL TOTAL CA: CPT

## 2019-04-02 RX ORDER — INSULIN LISPRO 100 [IU]/ML
INJECTION, SOLUTION INTRAVENOUS; SUBCUTANEOUS
Status: DISCONTINUED | OUTPATIENT
Start: 2019-04-02 | End: 2019-04-03 | Stop reason: ALTCHOICE

## 2019-04-02 RX ORDER — FLUOXETINE HYDROCHLORIDE 20 MG/1
20 CAPSULE ORAL DAILY
Status: DISCONTINUED | OUTPATIENT
Start: 2019-04-02 | End: 2019-04-03 | Stop reason: HOSPADM

## 2019-04-02 RX ORDER — PANTOPRAZOLE SODIUM 40 MG/1
40 TABLET, DELAYED RELEASE ORAL
Status: DISCONTINUED | OUTPATIENT
Start: 2019-04-02 | End: 2019-04-03 | Stop reason: HOSPADM

## 2019-04-02 RX ORDER — MAGNESIUM SULFATE 100 %
4 CRYSTALS MISCELLANEOUS AS NEEDED
Status: DISCONTINUED | OUTPATIENT
Start: 2019-04-02 | End: 2019-04-03 | Stop reason: HOSPADM

## 2019-04-02 RX ORDER — POTASSIUM CHLORIDE 7.45 MG/ML
10 INJECTION INTRAVENOUS
Status: COMPLETED | OUTPATIENT
Start: 2019-04-02 | End: 2019-04-02

## 2019-04-02 RX ORDER — INSULIN GLARGINE 100 [IU]/ML
15 INJECTION, SOLUTION SUBCUTANEOUS DAILY
Status: DISCONTINUED | OUTPATIENT
Start: 2019-04-02 | End: 2019-04-03

## 2019-04-02 RX ORDER — DEXTROSE 50 % IN WATER (D50W) INTRAVENOUS SYRINGE
25-50 AS NEEDED
Status: DISCONTINUED | OUTPATIENT
Start: 2019-04-02 | End: 2019-04-03 | Stop reason: HOSPADM

## 2019-04-02 RX ORDER — MAGNESIUM SULFATE 1 G/100ML
1 INJECTION INTRAVENOUS ONCE
Status: COMPLETED | OUTPATIENT
Start: 2019-04-02 | End: 2019-04-02

## 2019-04-02 RX ORDER — INSULIN LISPRO 100 [IU]/ML
5 INJECTION, SOLUTION INTRAVENOUS; SUBCUTANEOUS
Status: DISCONTINUED | OUTPATIENT
Start: 2019-04-02 | End: 2019-04-03 | Stop reason: ALTCHOICE

## 2019-04-02 RX ADMIN — FLUOXETINE 20 MG: 20 CAPSULE ORAL at 14:09

## 2019-04-02 RX ADMIN — MAGNESIUM SULFATE HEPTAHYDRATE 1 G: 1 INJECTION, SOLUTION INTRAVENOUS at 00:54

## 2019-04-02 RX ADMIN — HEPARIN SODIUM 5000 UNITS: 5000 INJECTION INTRAVENOUS; SUBCUTANEOUS at 17:30

## 2019-04-02 RX ADMIN — PIPERACILLIN SODIUM AND TAZOBACTAM SODIUM 3.38 G: 3; .375 INJECTION, POWDER, LYOPHILIZED, FOR SOLUTION INTRAVENOUS at 21:59

## 2019-04-02 RX ADMIN — INSULIN LISPRO 5 UNITS: 100 INJECTION, SOLUTION INTRAVENOUS; SUBCUTANEOUS at 17:29

## 2019-04-02 RX ADMIN — PIPERACILLIN SODIUM AND TAZOBACTAM SODIUM 3.38 G: 3; .375 INJECTION, POWDER, LYOPHILIZED, FOR SOLUTION INTRAVENOUS at 17:29

## 2019-04-02 RX ADMIN — DEXTROSE MONOHYDRATE AND SODIUM CHLORIDE 75 ML/HR: 5; .9 INJECTION, SOLUTION INTRAVENOUS at 21:12

## 2019-04-02 RX ADMIN — POTASSIUM CHLORIDE 10 MEQ: 10 INJECTION, SOLUTION INTRAVENOUS at 01:27

## 2019-04-02 RX ADMIN — INSULIN LISPRO 5 UNITS: 100 INJECTION, SOLUTION INTRAVENOUS; SUBCUTANEOUS at 09:40

## 2019-04-02 RX ADMIN — POTASSIUM CHLORIDE 10 MEQ: 10 INJECTION, SOLUTION INTRAVENOUS at 04:51

## 2019-04-02 RX ADMIN — POTASSIUM CHLORIDE 10 MEQ: 10 INJECTION, SOLUTION INTRAVENOUS at 02:35

## 2019-04-02 RX ADMIN — INSULIN LISPRO 6 UNITS: 100 INJECTION, SOLUTION INTRAVENOUS; SUBCUTANEOUS at 17:29

## 2019-04-02 RX ADMIN — DEXTROSE MONOHYDRATE AND SODIUM CHLORIDE 75 ML/HR: 5; .9 INJECTION, SOLUTION INTRAVENOUS at 06:18

## 2019-04-02 RX ADMIN — HEPARIN SODIUM 5000 UNITS: 5000 INJECTION INTRAVENOUS; SUBCUTANEOUS at 00:55

## 2019-04-02 RX ADMIN — INSULIN LISPRO 4 UNITS: 100 INJECTION, SOLUTION INTRAVENOUS; SUBCUTANEOUS at 11:42

## 2019-04-02 RX ADMIN — INSULIN LISPRO 5 UNITS: 100 INJECTION, SOLUTION INTRAVENOUS; SUBCUTANEOUS at 11:42

## 2019-04-02 RX ADMIN — INSULIN GLARGINE 15 UNITS: 100 INJECTION, SOLUTION SUBCUTANEOUS at 04:51

## 2019-04-02 RX ADMIN — PANTOPRAZOLE SODIUM 40 MG: 40 TABLET, DELAYED RELEASE ORAL at 04:54

## 2019-04-02 RX ADMIN — HEPARIN SODIUM 5000 UNITS: 5000 INJECTION INTRAVENOUS; SUBCUTANEOUS at 09:40

## 2019-04-02 RX ADMIN — PIPERACILLIN AND TAZOBACTAM 3.38 G: 3; .375 INJECTION, POWDER, FOR SOLUTION INTRAVENOUS at 09:41

## 2019-04-02 RX ADMIN — INSULIN LISPRO 4 UNITS: 100 INJECTION, SOLUTION INTRAVENOUS; SUBCUTANEOUS at 09:41

## 2019-04-02 RX ADMIN — PIPERACILLIN AND TAZOBACTAM 3.38 G: 3; .375 INJECTION, POWDER, FOR SOLUTION INTRAVENOUS at 01:19

## 2019-04-02 RX ADMIN — POTASSIUM CHLORIDE 10 MEQ: 10 INJECTION, SOLUTION INTRAVENOUS at 03:45

## 2019-04-02 RX ADMIN — INSULIN LISPRO 2 UNITS: 100 INJECTION, SOLUTION INTRAVENOUS; SUBCUTANEOUS at 21:59

## 2019-04-02 NOTE — PROGRESS NOTES
0605-Alert and oriented x 4. Lungs CTA. BS active x 4. Pain rated at 3/10 to abdomen. Ambulated from wheelchair to bed. Protonix given at 5 am in ICU, so did not give 730 am scheduled dose. Insulin pump placed in patient specific drawer. Started on D5% with NS at 75 ml/hr.

## 2019-04-02 NOTE — PROGRESS NOTES
Noted pt transfer to [address] [place] [address]. CM met with pt to discuss transition of care. CM discussed [facility] service that would be arranged. Pt indicated he does not feel he needs this and has declined H2H at this time. Anticipate pt will be discharged with in the next 24-48 hours with physician follow up. Care Management Interventions PCP Verified by CM: Yes 
Palliative Care Criteria Met (RRAT>21 & CHF Dx)?: No 
Mode of Transport at Discharge: Self(father) Transition of Care Consult (CM Consult): Discharge Planning Health Maintenance Reviewed: Yes Current Support Network: Relative's Home(father) Confirm Follow Up Transport: Family Plan discussed with Pt/Family/Caregiver: Yes Discharge Location Discharge Placement: Home

## 2019-04-02 NOTE — PROGRESS NOTES
Bedside shift change report given to [address] [place] [address] (oncoming nurse) by Day Gorman RN (offgoing nurse). Report included the following information SBAR, Kardex, ED Summary, Intake/Output, MAR, Accordion, Med Rec Status and Alarm Parameters . 0900 Shift assessment complete. Shift Summary: Shift uneventful. No complaints of chest pain or shortness of breath. Call light is within reach.

## 2019-04-02 NOTE — PROGRESS NOTES
Pharmacy Dosing Services: Zosyn (Piperacillin/Tazobactam) This patient had been receiving an extended infusion of Zosyn while in the intensive care unit. Since transferred out of ICU, dose adjusted to:  Zosyn 3.375 grams IV q6h (each dose to be infused over 30 minutes). Scr = 0.74   CrCl > 100 ml/min Towana Goldberg, PharmD   [phone]

## 2019-04-02 NOTE — PROGRESS NOTES
Patient transferred out of ICU. No acute pulmonary issues. PCCM will sign off. Please call us with any questions.   
 
Radha Moseley MD 4/2/2019 8:25 AM

## 2019-04-02 NOTE — DIABETES MGMT
NUTRITIONAL Progress Note GLYCEMIC CONTROL/ PLAN OF CARE Vinod Dys           23 y.o.           3/31/2019 1. Diabetic ketoacidosis without coma associated with type 1 diabetes mellitus ([address] 2. Nausea and vomiting, intractability of vomiting not specified, unspecified vomiting type 3. Diarrhea, unspecified type 4. Leukocytosis, unspecified type PMHx: Type 1 Diabetes, ADD, Depression, Insomnia, Seizure INTERVENTIONS/PLAN:  
-Recommend continuing Diabetic Consistent Carb Diet. 
-Provide diabetes self-mgt education p/t d/c. ASSESSMENT:  
Nutritional Status: 
Normal weight per BMI 22.5 kg/m2 (18.5-24.9 kg/m2) Diabetes Management:  
Recent Glucose Results:  
Lab Results Component Value Date/Time  (H) 04/02/2019 07:58 AM  
  (H) 04/02/2019 04:00 AM  
  (H) 04/01/2019 10:58 PM  
 GLUCPOC 201 (H) 04/02/2019 08:04 AM  
 GLUCPOC 103 04/02/2019 05:01 AM  
 GLUCPOC 115 (H) 04/02/2019 03:50 AM  
 
Within target range (non-ICU: <140; ICU<180): [] Yes   [x]  No 
 
Current Insulin regimen:  
Lantus 15 units daily Humalog 5 units w/ meals Humalog corrective normal insulin sensitivity Home medication/insulin regimen:  
Insulin pump HbA1c:(3/31/19) 11.2% equivalent to average blood glucose level 275 mg/dL. Adequate glycemic control PTA:  [] Yes  [x] No 
  
 
SUBJECTIVE/OBJECTIVE: Information obtained from: Pt confirms a good appetite. Ate lunch yesterday, but did not eat dinner or breakfast this morning because he was sleeping. Informed pt he can still order breakfast at anytime. Pt denies any unintentional wt loss or difficulty w/ appetite pta. Will continue to monitor and follow up per policy. Diet: DIET DIABETIC WITH OPTIONS Patient Vitals for the past 100 hrs: 
 % Diet Eaten 04/01/19 0904 100 % Medications: [x]                Reviewed Most Recent POC Glucose:  
Recent Labs 04/02/19 
0758 04/02/19 
0400 04/01/19 2258 04/01/19 
1912 * 112* 140* 255* Labs:  
Lab Results Component Value Date/Time Hemoglobin A1c 11.2 (H) 03/31/2019 09:35 PM  
 
Lab Results Component Value Date/Time Sodium 138 04/02/2019 07:58 AM  
 Potassium 3.4 (L) 04/02/2019 07:58 AM  
 Chloride 108 04/02/2019 07:58 AM  
 CO2 22 04/02/2019 07:58 AM  
 Anion gap 8 04/02/2019 07:58 AM  
 Glucose 203 (H) 04/02/2019 07:58 AM  
 BUN 5 (L) 04/02/2019 07:58 AM  
 Creatinine 0.74 04/02/2019 07:58 AM  
 Calcium 7.9 (L) 04/02/2019 07:58 AM  
 Magnesium 2.0 04/02/2019 07:58 AM  
 Phosphorus 6.8 (H) 03/31/2019 09:35 PM  
 Albumin 4.6 03/31/2019 09:35 PM  
 
 
Anthropometrics: BMI (calculated): 22.5 kg/m2 Wt Readings from Last 1 Encounters:  
04/01/19 73 kg (160 lb 15 oz) Ht Readings from Last 1 Encounters:  
04/01/19 5' 11\" (1.803 m) Estimated Nutrition Needs: 2167kcal/day (MSJ x 1.25, 73 g PRO/day ( 1 g PRO/kg), 2167 mL fluid/day (1 mL fluid/kcal) Based on:   [x]          Actual BW: 73.2 kg Nutrition Diagnoses: Altered nutrition-related lab value related to pt not wearing insulin pump/rationing insulin (awaiting insurance coverage) as evidenced by Hgb A1C 11.2%. Nutrition Interventions: 
-Recommend continuing Diabetic Consistent Carb Diet. Goal:  
Blood glucose will be within target range of  mg/dL by 4/5/19. Pt will consume >/=75% of all meals by 4/5/19. Nutrition Monitoring and Evaluation   
 
[x]     Monitor po intake [x]     Continue inpatient monitoring and intervention 
[]     Other: 
 
 
Nutrition Risk:  []   High     []  Moderate    [x]  Minimal/Uncompromised Florencia Ty, RD 
pgr [phone]

## 2019-04-02 NOTE — ROUTINE PROCESS
TRANSFER - IN REPORT: 
 
Verbal report received from [facility] RN(name) on Darylene Sheen  being received from ICU(unit) for routine progression of care Report consisted of patients Situation, Background, Assessment and  
Recommendations(SBAR). Information from the following report(s) SBAR, Kardex, Intake/Output and MAR was reviewed with the receiving nurse. Opportunity for questions and clarification was provided. Assessment completed upon patients arrival to unit and care assumed.

## 2019-04-02 NOTE — PROGRESS NOTES
Bedside shift change report given to Mayte Barth RN (oncoming nurse) by Yoselyn Lockhart RN (offgoing nurse). Report included the following information SBAR, Kardex, ED Summary, Intake/Output, MAR, Accordion, Recent Results and Alarm Parameters .

## 2019-04-02 NOTE — PROGRESS NOTES
Hospitalist Progress Note Patient: Vinod Palacios MRN: [id number]  CSN: [id number] YOB: 1995  Age: 24 y.o. Sex: male DOA: 3/31/2019 LOS:  LOS: 1 day Chief Complaint: 
 
DKA Assessment/Plan 1. Diabetic ketoacidosis 2. Type I diabetes mellitus 3. Hypokalemia 1. Diabetic ketoacidosis has resolved. Patient transferred out of intensive care unit. Blood glucose levels improved. Continue to monitor four times daily before meals and at bedtime. Start basal and bolus insulin. Continue diabetic diet. Hypoglycemia protocol is in place. 2. Plan as above. Patient is on an insulin pump at home but does not regularly check his glucose levels. 3. Replete potassium this morning with intravenous potassium. Patient's renal function has improved, no acute changes at this time. Patient Active Problem List  
Diagnosis Code  DKA (diabetic ketoacidoses) ([facility]) E13.10  Renal insufficiency N28.9  Type I diabetes mellitus ([facility] E10.9 Subjective: 
 
Mild abdominal pain this morning. Review of systems: 
 
Constitutional: denies fevers, chills, myalgias Respiratory: denies SOB, cough Cardiovascular: denies chest pain, palpitations Gastrointestinal: denies nausea, vomiting, diarrhea Vital signs/Intake and Output: 
Visit Vitals /90 (BP 1 Location: Left arm, BP Patient Position: At rest) Pulse 84 Temp 98.1 °F (36.7 °C) Resp 18 Ht 5' 11\" (1.803 m) Wt 73 kg (160 lb 15 oz) SpO2 100% BMI 22.45 kg/m² Current Shift:  04/01 1901 - 04/02 0700 In: 4014.2 [I.V.:4014.2] Out: 1700 [Urine:1700] Last three shifts:  03/31 0701 - 04/01 1900 In: 3182.2 [P.O.:120; I.V.:3062.2] Out: 6850 [Urine:2675] Exam: 
 
General: Well developed white male, alert, NAD, OX3 Head/Neck: NCAT, supple, No masses, No lymphadenopathy CVS:Regular rate and rhythm, no M/R/G, S1/S2 heard, no thrill Lungs:Clear to auscultation bilaterally, no wheezes, rhonchi, or rales Abdomen: Soft, Nontender, No distention, Normal Bowel sounds, No hepatomegaly Extremities: No C/C/E, pulses palpable 2+ Skin:normal texture and turgor, no rashes, no lesions Neuro:grossly normal , follows commands Psych:appropriate Labs: Results:  
   
Chemistry Recent Labs 04/02/19 0400 04/01/19 2258 04/01/19 1912  03/31/19 2135 * 140* 255*   < > 415*  140 138   < > 135* K 3.3* 3.5 3.6   < > 4.8 * 110* 110*   < > 99* CO2 24 23 22   < > 9*  
BUN 6* 9 9   < > 16 CREA 0.81 1.00 1.07   < > 1.65* CA 7.8* 7.9* 7.8*   < > 9.5 AGAP 6 7 6   < > 27* BUCR 7* 9* 8*   < > 10* AP  --   --   --   --  159* TP  --   --   --   --  8.4* ALB  --   --   --   --  4.6 GLOB  --   --   --   --  3.8 AGRAT  --   --   --   --  1.2  
 < > = values in this interval not displayed. CBC w/Diff Recent Labs 04/02/19 0400 03/31/19 2135 WBC 7.7 18.0*  
RBC 4.94 6.09* HGB 14.3 18.0*  
HCT 40.9 52.7*  
 383 GRANS 63 78* LYMPH 25 13* EOS 0 0 Cardiac Enzymes Recent Labs  
  03/31/19 2135  CKND1 1.6 Coagulation No results for input(s): PTP, INR, APTT in the last 72 hours. No lab exists for component: INREXT Lipid Panel No results found for: CHOL, CHOLPOCT, CHOLX, CHLST, CHOLV, 655120, HDL, LDL, LDLC, DLDLP, 422490, VLDLC, VLDL, TGLX, TRIGL, TRIGP, TGLPOCT, CHHD, CHHDX  
BNP No results for input(s): BNPP in the last 72 hours. Liver Enzymes Recent Labs  
  03/31/19 2135  
TP 8.4* ALB 4.6 * SGOT 18 Thyroid Studies No results found for: T4, T3U, TSH, TSHEXT Procedures/imaging: see electronic medical records for all procedures/Xrays and details which were not copied into this note but were reviewed prior to creation of Plan Josh SpeakerSOLIS

## 2019-04-02 NOTE — PROGRESS NOTES
Problem: Diabetes Self-Management Goal: *Disease process and treatment process Description Define diabetes and identify own type of diabetes; list 3 options for treating diabetes. Outcome: Progressing Towards Goal 
Goal: *Incorporating nutritional management into lifestyle Description Describe effect of type, amount and timing of food on blood glucose; list 3 methods for planning meals. Outcome: Progressing Towards Goal 
Goal: *Incorporating physical activity into lifestyle Description State effect of exercise on blood glucose levels. Outcome: Progressing Towards Goal 
Goal: *Developing strategies to promote health/change behavior Description Define the ABC's of diabetes; identify appropriate screenings, schedule and personal plan for screenings. Outcome: Progressing Towards Goal 
Goal: *Using medications safely Description State effect of diabetes medications on diabetes; name diabetes medication taking, action and side effects. Outcome: Progressing Towards Goal 
Goal: *Monitoring blood glucose, interpreting and using results Description Identify recommended blood glucose targets  and personal targets. Outcome: Progressing Towards Goal 
Goal: *Prevention, detection, treatment of acute complications Description List symptoms of hyper- and hypoglycemia; describe how to treat low blood sugar and actions for lowering  high blood glucose level. Outcome: Progressing Towards Goal 
Goal: *Prevention, detection and treatment of chronic complications Description Define the natural course of diabetes and describe the relationship of blood glucose levels to long term complications of diabetes. Outcome: Progressing Towards Goal 
Goal: *Developing strategies to address psychosocial issues Description Describe feelings about living with diabetes; identify support needed and support network Outcome: Progressing Towards Goal 
Goal: *Insulin pump training Outcome: Progressing Towards Goal 
 Goal: *Sick day guidelines Outcome: Progressing Towards Goal 
Goal: *Patient Specific Goal (EDIT GOAL, INSERT TEXT) Outcome: Progressing Towards Goal 
  
Problem: Patient Education: Go to Patient Education Activity Goal: Patient/Family Education Outcome: Progressing Towards Goal 
  
Problem: Falls - Risk of 
Goal: *Absence of Falls Description Document Alan Rossi Fall Risk and appropriate interventions in the flowsheet. Outcome: Progressing Towards Goal 
  
Problem: Patient Education: Go to Patient Education Activity Goal: Patient/Family Education Outcome: Progressing Towards Goal

## 2019-04-02 NOTE — ROUTINE PROCESS
Bedside and Verbal shift change report given to Shonna RN (oncoming nurse) by [employer] RN (offgoing nurse). Report included the following information SBAR, Kardex, Intake/Output and MAR.

## 2019-04-03 VITALS
SYSTOLIC BLOOD PRESSURE: 136 MMHG | DIASTOLIC BLOOD PRESSURE: 91 MMHG | WEIGHT: 167.1 LBS | OXYGEN SATURATION: 99 % | RESPIRATION RATE: 18 BRPM | BODY MASS INDEX: 23.4 KG/M2 | HEIGHT: 71 IN | TEMPERATURE: 97.9 F | HEART RATE: 92 BPM

## 2019-04-03 LAB
GLUCOSE BLD STRIP.AUTO-MCNC: 223 MG/DL (ref 70–110)
GLUCOSE BLD STRIP.AUTO-MCNC: 255 MG/DL (ref 70–110)
GLUCOSE BLD STRIP.AUTO-MCNC: 263 MG/DL (ref 70–110)
GLUCOSE BLD STRIP.AUTO-MCNC: 306 MG/DL (ref 70–110)

## 2019-04-03 PROCEDURE — 74011250637 HC RX REV CODE- 250/637: Performed by: PHYSICIAN ASSISTANT

## 2019-04-03 PROCEDURE — 74011250636 HC RX REV CODE- 250/636: Performed by: PHYSICIAN ASSISTANT

## 2019-04-03 PROCEDURE — 74011250637 HC RX REV CODE- 250/637: Performed by: HOSPITALIST

## 2019-04-03 PROCEDURE — 74011000258 HC RX REV CODE- 258: Performed by: PHYSICIAN ASSISTANT

## 2019-04-03 PROCEDURE — 82962 GLUCOSE BLOOD TEST: CPT

## 2019-04-03 RX ORDER — INSULIN PUMP SYRINGE, 3 ML
EACH MISCELLANEOUS
Qty: 1 KIT | Refills: 0 | Status: SHIPPED | OUTPATIENT
Start: 2019-04-03

## 2019-04-03 RX ORDER — INSULIN LISPRO 100 [IU]/ML
INJECTION, SOLUTION INTRAVENOUS; SUBCUTANEOUS
Qty: 1 VIAL | Refills: 0 | Status: SHIPPED | OUTPATIENT
Start: 2019-04-03

## 2019-04-03 RX ORDER — FLUOXETINE HYDROCHLORIDE 20 MG/1
20 CAPSULE ORAL DAILY
Status: DISCONTINUED | OUTPATIENT
Start: 2019-04-03 | End: 2019-04-03

## 2019-04-03 RX ORDER — INSULIN LISPRO 100 [IU]/ML
INJECTION, SOLUTION INTRAVENOUS; SUBCUTANEOUS
Qty: 1 VIAL | Refills: 0 | Status: SHIPPED
Start: 2019-04-03 | End: 2019-04-03 | Stop reason: SDUPTHER

## 2019-04-03 RX ADMIN — PANTOPRAZOLE SODIUM 40 MG: 40 TABLET, DELAYED RELEASE ORAL at 06:04

## 2019-04-03 RX ADMIN — PIPERACILLIN SODIUM AND TAZOBACTAM SODIUM 3.38 G: 3; .375 INJECTION, POWDER, LYOPHILIZED, FOR SOLUTION INTRAVENOUS at 05:34

## 2019-04-03 RX ADMIN — FLUOXETINE 20 MG: 20 CAPSULE ORAL at 09:33

## 2019-04-03 RX ADMIN — HEPARIN SODIUM 5000 UNITS: 5000 INJECTION INTRAVENOUS; SUBCUTANEOUS at 01:11

## 2019-04-03 RX ADMIN — PIPERACILLIN SODIUM AND TAZOBACTAM SODIUM 3.38 G: 3; .375 INJECTION, POWDER, LYOPHILIZED, FOR SOLUTION INTRAVENOUS at 09:29

## 2019-04-03 NOTE — PROGRESS NOTES
0730 Assumed care of pt from Xochitl Osborne RN. Pt resting quietly in bed , no signs of distress, call bell within reach. 1126 Per NONA Aponte, check blood sugar every hour x2 prior to discharge

## 2019-04-03 NOTE — DIABETES MGMT
INSULIN PUMP Assessment/Recommendations: 
- pt with known h/o poorly managed DM1 ~ 19 years - HbA1c not within recommended range for age + comorbids, r/t non-adherence to DM regimen, insulin degradation and SMBG inconsistency - pt coherent and able to place and operate Medtronic pump independently at this time 
- fresh infusion set placed on left outer thigh, pump agreement reviewed, signed & on file - pt pleasant and working well with staff regarding pump management 
- recommend continue with current pt management of insulin via home pump, BG check Q 1 hour x2 after pt bolus to confirm pump is operational 
* noted pump information below * please obtain label from pharmacy to scan when pt boluses for documentation purposes, will feed in to [facility] * please complete Sub-Q insulin infusion device Line assessment 1x/shift under flowsheets 
 -per insulin pump policy and agreement pt is responsible for management of home insulin pump & providing ALL pump supplies including insulin (please send any insulin vials to pharmacy for labeling) - RN notified of the above information Recent Glucose Results:  
Lab Results Component Value Date/Time GLUCPOC 255 (H) 04/03/2019 12:26 PM  
 GLUCPOC 263 (H) 04/03/2019 11:46 AM  
 GLUCPOC 306 (H) 04/03/2019 10:22 AM  
 
INSULIN PUMP    
Brand of pump and model #:   Medtronic Type of insulin used   Humalog Type of infusion set   Does not know What are your basal rate(s)? 12 MN-9AM 1 unit/hr; 9AM-1PM 1.3 units/hr; 1PM-12MN    1.5 units/hr What is your sensitivity factor? 48 What is your insulin to carbohydrate ratio? 1:6  
What is your total daily dose?    
What conventional insulin dose do you use if you pump were inoperable? (\"off pump plan\")   Humalog injections  
  
Ra Joyner MS, RN, CDE Glycemic Control Team 
[phone] Pager [phone] (M-TH 8:00-4:30P) *After Hours pager [phone]

## 2019-04-03 NOTE — DISCHARGE INSTRUCTIONS
Patient Education     DISCHARGE SUMMARY from Nurse    PATIENT INSTRUCTIONS:    After general anesthesia or intravenous sedation, for 24 hours or while taking prescription Narcotics:  Limit your activities  Do not drive and operate hazardous machinery  Do not make important personal or business decisions  Do  not drink alcoholic beverages  If you have not urinated within 8 hours after discharge, please contact your surgeon on call. Report the following to your surgeon:  Excessive pain, swelling, redness or odor of or around the surgical area  Temperature over 100.5  Nausea and vomiting lasting longer than 4 hours or if unable to take medications  Any signs of decreased circulation or nerve impairment to extremity: change in color, persistent  numbness, tingling, coldness or increase pain  Any questions    What to do at Home:  Recommended activity: Activity as tolerated,     *  Please give a list of your current medications to your Primary Care Provider. *  Please update this list whenever your medications are discontinued, doses are      changed, or new medications (including over-the-counter products) are added. *  Please carry medication information at all times in case of emergency situations. These are general instructions for a healthy lifestyle:    No smoking/ No tobacco products/ Avoid exposure to second hand smoke  Surgeon General's Warning:  Quitting smoking now greatly reduces serious risk to your health. Obesity, smoking, and sedentary lifestyle greatly increases your risk for illness    A healthy diet, regular physical exercise & weight monitoring are important for maintaining a healthy lifestyle    You may be retaining fluid if you have a history of heart failure or if you experience any of the following symptoms:  Weight gain of 3 pounds or more overnight or 5 pounds in a week, increased swelling in our hands or feet or shortness of breath while lying flat in bed.   Please call your doctor as soon as you notice any of these symptoms; do not wait until your next office visit. Recognize signs and symptoms of STROKE:    F-face looks uneven    A-arms unable to move or move unevenly    S-speech slurred or non-existent    T-time-call 911 as soon as signs and symptoms begin-DO NOT go       Back to bed or wait to see if you get better-TIME IS BRAIN. Warning Signs of HEART ATTACK     Call 911 if you have these symptoms:  Chest discomfort. Most heart attacks involve discomfort in the center of the chest that lasts more than a few minutes, or that goes away and comes back. It can feel like uncomfortable pressure, squeezing, fullness, or pain. Discomfort in other areas of the upper body. Symptoms can include pain or discomfort in one or both arms, the back, neck, jaw, or stomach. Shortness of breath with or without chest discomfort. Other signs may include breaking out in a cold sweat, nausea, or lightheadedness. Don't wait more than five minutes to call 911 - MINUTES MATTER! Fast action can save your life. Calling 911 is almost always the fastest way to get lifesaving treatment. Emergency Medical Services staff can begin treatment when they arrive -- up to an hour sooner than if someone gets to the hospital by car. The discharge information has been reviewed with the patient. The patient verbalized understanding. Discharge medications reviewed with the patient and appropriate educational materials and side effects teaching were provided. ___________________________________________________________________________________________________________________________________     Diabetic Ketoacidosis (DKA): Care Instructions  Your Care Instructions  Diabetic ketoacidosis (DKA) happens when the body does not have enough insulin and can't get the sugar it needs for energy. When the body can't use sugar for energy, it starts to use fat for energy. This process makes fatty acids called ketones.  The ketones build up in the blood and change the chemical balance in your body. This problem can be very dangerous and needs to be treated. Without treatment, it can lead to a coma or death. DKA occurs most often in people with type 1 diabetes. But people with type 2 diabetes also can get it. DKA can be caused by many things. It can happen if you don't take enough insulin. It can also happen if you have an infection or illness like the flu. Sometimes it happens if you are very dehydrated. DKA can only be treated with insulin and fluids. These are often given in a vein (IV). Follow-up care is a key part of your treatment and safety. Be sure to make and go to all appointments, and call your doctor if you are having problems. It's also a good idea to know your test results and keep a list of the medicines you take. How can you care for yourself at home? To reduce your chance of ketoacidosis:  · Take your insulin and other diabetes medicines on time and in the right dose. ? If an infection caused your DKA and your doctor prescribed antibiotics, take them as directed. Do not stop taking them just because you feel better. You need to take the full course of antibiotics. · Test your blood sugar before meals and at bedtime or as often as your doctor advises. This is the best way to know when your blood sugar is high so you can treat it early. Watching for symptoms is not as helpful. This is because you may not have symptoms until your blood sugar is very high. Or you may not notice them. · Teach others at work and at home how to check your blood sugar. Make sure that someone else knows how do it in case you can't. · Wear or carry medical identification at all times. This is very important in case you are too sick or injured to speak for yourself. · Talk to your doctor about when you can start to exercise again. · Eat regular meals that spread your calories and carbohydrate throughout the day.  This will help keep your blood sugar steady. · When you are sick:  ? Take your insulin and diabetes medicines. This is important even if you are vomiting and having trouble eating or drinking. Your blood sugar may go up because you are sick. If you are eating less than normal, you may need to change your dose of insulin. Talk with your doctor about a plan when you are well. Then you will know what to do when you are sick. ? Drink extra fluids to prevent dehydration. These include water, broth, and sugar-free drinks. If you don't drink enough, the insulin from your shot may not get into your blood. So your blood sugar may go up. ? Try to eat as you normally do, with a focus on healthy food choices. ? Check your blood sugar at least every 3 to 4 hours. Check it more often if it's rising fast. If your doctor has told you to take an extra insulin dose for high blood sugar levels (for example, above 240 mg/dL) be sure to take the right amount. If you're not sure how much to take, call your doctor. ? Check your temperature and pulse often. If your temperature goes up, call your doctor. You may be getting worse. ? If you take insulin, check your urine or blood for ketones, especially when you have high blood sugar (for example, above 240 mg/dL). Call your doctor if your ketone level is moderate or high. If you know your blood sugar is high, treat it before it gets worse. · If you missed your usual dose of insulin or other diabetes medicine, take the missed dose or take the amount your doctor told you to take if this happens. · If you and your doctor decide on a dose of extra-fast-acting insulin, give yourself the right dose. If you take insulin and your doctor has not told you how much fast-acting insulin to take based on your blood sugar level, call your doctor. · Drink extra water or sugar-free drinks to prevent dehydration. · Wait 30 minutes after you take extra insulin or missed medicines. Then check your blood sugar again.   · If symptoms of high blood sugar get worse or your blood sugar level keeps rising, call your doctor. If you start to feel sleepy or confused, call 911. When should you call for help? Call 911 anytime you think you may need emergency care. For example, call if:    · You passed out (lost consciousness).     · You are confused or cannot think clearly.     · Your blood sugar is very high or very low.    Watch closely for changes in your health, and be sure to contact your doctor if:    · Your blood sugar stays outside the level your doctor set for you.     · You have any problems. Where can you learn more? Go to http://susi-justin.info/. Darci Rodriguez in the search box to learn more about \"Diabetic Ketoacidosis (DKA): Care Instructions. \"  Current as of: July 25, 2018  Content Version: 11.9  © 0810-0934 [employer]. Care instructions adapted under license by [employer] (which disclaims liability or warranty for this information). If you have questions about a medical condition or this instruction, always ask your healthcare professional. [address] any warranty or liability for your use of this information.

## 2019-04-03 NOTE — PROGRESS NOTES
DC Plan: Discharge home with MD follow up, family assistance Chart reviewed. Pt admitted for DKA. Discharge order for this afternoon noted. Met with pt at bedside. Pt independent. Pt states he has been in contact with MD [address] [place] [address] office for pump supplies. Pt states he was having difficulty getting supplies because he did not have medicaid policy number available. Pt was provided his medicaid number per his request.  No other dc concerns identified. Pt states he has a ride home. CM will cont to follow for transition of care needs. Care Management Interventions PCP Verified by CM: Yes 
Palliative Care Criteria Met (RRAT>21 & CHF Dx)?: No 
Mode of Transport at Discharge: Self(father) Transition of Care Consult (CM Consult): Discharge Planning Health Maintenance Reviewed: Yes Current Support Network: Relative's Home(father) Confirm Follow Up Transport: Family Plan discussed with Pt/Family/Caregiver: Yes Discharge Location Discharge Placement: Home

## 2019-04-03 NOTE — ROUTINE PROCESS
Bedside and Verbal shift change report given to amanda dang (oncoming nurse) by Nathalia Steinberg RN 
 (offgoing nurse). Report included the following information SBAR, Kardex, Procedure Summary, MAR and Recent Results.

## 2019-04-03 NOTE — PROGRESS NOTES
Discharge instructions reviewed with the patient. Patient verbalized understanding. All questions answered. IV discontinued, no redness, swelling or pain noted. Patient verbalized understanding and verified by teach back. Patients mom here for transportation home. Patient discharged off the unit via wheelchair. Patient armband removed and shredded

## 2019-04-03 NOTE — PROGRESS NOTES
Shift Summer Note: Assumed care of patient in bed awake and quiet, no complaints offered, no c/o nausea or discomfort, Uneventful night, call bell within reach. Patient Vitals for the past 12 hrs: 
 Temp Pulse Resp BP SpO2  
04/03/19 0328 98.1 °F (36.7 °C) 76 18 137/90 98 % 04/02/19 2232 97.7 °F (36.5 °C) 84 16 130/78 99 % 04/02/19 1922 98.2 °F (36.8 °C) 86 18 133/77 98 %

## 2019-04-03 NOTE — DISCHARGE SUMMARY
Discharge Summary    Patient: Lance Moctezuma MRN: [id number]  CSN: [id number]    YOB: 1995  Age: 24 y.o. Sex: male    DOA: 3/31/2019 LOS:  LOS: 2 days   Discharge Date:      Primary Care Provider:  Teresa Coleman MD    Admission Diagnoses: DKA (diabetic ketoacidoses) [facility]) [E13.10]    Discharge Diagnoses:    Problem List as of 4/3/2019 Never Reviewed          Codes Class Noted - Resolved    * (Principal) DKA (diabetic ketoacidoses) ([facility] ICD-10-CM: E13.10  ICD-9-CM: 250.10  4/1/2019 - Present        Renal insufficiency ICD-10-CM: N28.9  ICD-9-CM: 593.9  4/1/2019 - Present        Type I diabetes mellitus ([facility] ICD-10-CM: E10.9  ICD-9-CM: 250.01  4/1/2019 - Present              Discharge Medications:     Current Discharge Medication List      START taking these medications    Details   glucagon (GLUCAGON EMERGENCY KIT, HUMAN,) 1 mg injection 1 mL by IntraVENous route once for 1 dose. Qty: 1 Vial, Refills: 0      lancets-blood glucose strips 30 gauge cmpk 1 Each by Does Not Apply route four (4) times daily. Qty: 1 Dose Pack, Refills: 0         CONTINUE these medications which have CHANGED    Details   insulin lispro (HUMALOG U-100 INSULIN) 100 unit/mL injection Use as directed with insulin pump  Qty: 1 Vial, Refills: 0         CONTINUE these medications which have NOT CHANGED    Details   fluoxetine HCl (PROZAC PO) Take 20 mg by mouth daily. AMPHET ASP/AMPHET/D-AMPHET (ADDERALL PO) Take  by mouth. insulin pump (PATIENT SUPPLIED) Misc by SubCUTAneous route as needed. RANITIDINE HCL PO Take  by mouth.      promethazine HCl (PROMETHAZINE PO) Take  by mouth.          STOP taking these medications       HYDROcodone-acetaminophen (NORCO) 5-325 mg per tablet Comments:   Reason for Stopping:         nystatin-TCN-HC-diphenhydramin (FIRST-RICCO'S MOUTHWASH) SusR suspension Comments:   Reason for Stopping:               Discharge Condition: Good    Procedures : None    Consults: Pulmonary/Critical Care      PHYSICAL EXAM    Visit Vitals  BP (!) 141/96 (BP 1 Location: Left arm, BP Patient Position: Sitting)   Pulse 84   Temp 98.1 °F (36.7 °C)   Resp 18   Ht 5' 11\" (1.803 m)   Wt 75.8 kg (167 lb 1.6 oz)   SpO2 100%   BMI 23.31 kg/m²     General: Well developed, well nourished male. Awake, cooperative, no acute distress    HEENT: NC, Atraumatic. PERRLA, EOMI. Anicteric sclerae. Lungs:  CTA Bilaterally. No Wheezing/Rhonchi/Rales. Heart:  Regular  rhythm,  No murmur, No Rubs, No Gallops  Abdomen: Soft, Non distended, Non tender. +Bowel sounds,   Extremities: No c/c/e  Psych:   Not anxious or agitated. Neurologic:  No acute neurological deficits. Admission HPI : Andre Hardy is a 24 y.o. male who has a history of type I diabetes on an insulin pump and depression, who presents to the emergency department with complaints of abdominal pain, nausea, and vomiting for 4 days. He reports one episode of diarrhea. He states he has been wearing his insulin pump, but has not been checking his blood sugars as regularly as he should have been. He states he does not have supplies at home to do so. He denies any ill contacts, denies any headache, chest pain, shortness of breath, dysuria, hematuria, hematochezia, or melena. Hospital Course: This patient was admitted to medical services on April 1, 2019 for diabetic ketoacidosis. Other admission diagnoses include type I diabetes, renal insufficiency, and depression. The patient was admitted to the intensive care unit for further care. The patient was started on an insulin drip, and his glucose was monitored every hour. His insulin pump was held at the time of admission. On admission, his electrolytes were within reasonable balance, and his renal function was slightly elevated. His labs were monitored every four hours, and his anion gap slowly improved, however his bicarbonate levels remained low.  He was started on a sodium bicarbonate infusion while in the intensive care unit. Eventually, his blood glucose levels dropped below 200, and he was switched to D5/normal saline. The sodium bicarbonate infusion was discontinued. His anion gap stabilized, as did his bicarbonate level. The insulin drip was discontinued, and he was then transferred to the medical unit for the remainder of his hospitalization. He initially was placed on basal and bolus subcutaneous insulin during his time on the medical floor. Prior to discharge, the patient was restarted on his insulin pump. His blood glucose levels were monitored after resuming his insulin pump and remained stable. His renal function improved and is now back to normal.     The patient will be discharged with supplies, including a refill of Humalog, lancets and glucometer strips, and a glucagon kit. The patient was advised to follow up with his primary care physician upon discharge. Activity: Activity as tolerated    Diet: Diabetic Diet    Follow-up: Primary care physician    Disposition: Home    Minutes spent on discharge: 32       Labs: Results:       Chemistry Recent Labs     04/02/19  1015 04/02/19 0758 04/02/19 0400 04/01/19 2258 03/31/19 2135   GLU  --  203* 112* 140*   < > 415*   NA  --  138 141 140   < > 135*   K 3.5 3.4* 3.3* 3.5   < > 4.8   CL  --  108 111* 110*   < > 99*   CO2  --  22 24 23   < > 9*   BUN  --  5* 6* 9   < > 16   CREA  --  0.74 0.81 1.00   < > 1.65*   CA  --  7.9* 7.8* 7.9*   < > 9.5   AGAP  --  8 6 7   < > 27*   BUCR  --  7* 7* 9*   < > 10*   AP  --   --   --   --   --  159*   TP  --   --   --   --   --  8.4*   ALB  --   --   --   --   --  4.6   GLOB  --   --   --   --   --  3.8   AGRAT  --   --   --   --   --  1.2    < > = values in this interval not displayed.       CBC w/Diff Recent Labs     04/02/19 0758 04/02/19 0400 03/31/19 2135   WBC 7.2 7.7 18.0*   RBC 4.97 4.94 6.09*   HGB 14.2 14.3 18.0*   HCT 41.4 40.9 52.7*    224 383   GRANS --  63 78*   LYMPH  --  25 13*   EOS  --  0 0      Cardiac Enzymes Recent Labs     03/31/19 2135      CKND1 1.6      Coagulation No results for input(s): PTP, INR, APTT in the last 72 hours. No lab exists for component: INREXT    Lipid Panel No results found for: CHOL, CHOLPOCT, CHOLX, CHLST, CHOLV, 988431, HDL, LDL, LDLC, DLDLP, 279139, VLDLC, VLDL, TGLX, TRIGL, TRIGP, TGLPOCT, CHHD, CHHDX   BNP No results for input(s): BNPP in the last 72 hours. Liver Enzymes Recent Labs     03/31/19 2135   TP 8.4*   ALB 4.6   *   SGOT 18      Thyroid Studies No results found for: T4, T3U, TSH, TSHEXT         Significant Diagnostic Studies: Ct Abd Pelv W Cont    Result Date: 3/31/2019  EXAM: CT of the abdomen and pelvis INDICATION: Abdominal pain. COMPARISON: None. TECHNIQUE: Axial CT imaging of the abdomen and pelvis was performed with intravenous contrast. Multiplanar reformats were generated. Dose reduction techniques used: automated exposure control, adjustment of the mAs and/or kVp according to patient size, and iterative reconstruction techniques. _______________ FINDINGS: LOWER CHEST: Unremarkable. LIVER, BILIARY: Liver is normal. No biliary dilation. Gallbladder is unremarkable. PANCREAS: Normal. SPLEEN: Normal. ADRENALS: Normal. KIDNEYS: Normal. LYMPH NODES: No enlarged lymph nodes. GASTROINTESTINAL TRACT: No bowel dilation or wall thickening. PELVIC ORGANS: Unremarkable. VASCULATURE: Unremarkable. BONES: No acute or aggressive osseous abnormalities identified. OTHER: None. _______________     IMPRESSION: No acute intra-abdominal process. Xr Chest Port    Result Date: 3/31/2019  --------------------------------------------------------------------------- <<<<<<<<<           [facility]           >>>>>>>>> --------------------------------------------------------------------------- CLINICAL HISTORY:  Tachycardia. COMPARISON EXAMINATIONS:  None.  ---  SINGLE FRONTAL VIEW OF THE CHEST  --- The lungs and pleural spaces are clear. The mediastinum is unremarkable in appearance. No significant osseous abnormalities are identified.  --------------    Impression: -------------- No active pulmonary disease. No results found for this or any previous visit.         CC: Lex Burgos MD

## 2019-04-04 ENCOUNTER — HOSPITAL ENCOUNTER (EMERGENCY)
Age: 24
Discharge: HOME | End: 2019-04-04
Attending: EMERGENCY MEDICINE
Payer: COMMERCIAL

## 2019-04-04 VITALS
RESPIRATION RATE: 16 BRPM | HEART RATE: 89 BPM | DIASTOLIC BLOOD PRESSURE: 92 MMHG | BODY MASS INDEX: 23.8 KG/M2 | TEMPERATURE: 97.8 F | WEIGHT: 170 LBS | SYSTOLIC BLOOD PRESSURE: 134 MMHG | HEIGHT: 71 IN | OXYGEN SATURATION: 100 %

## 2019-04-04 DIAGNOSIS — R73.9 HYPERGLYCEMIA: Primary | ICD-10-CM

## 2019-04-04 LAB
ALBUMIN SERPL-MCNC: 3.4 G/DL (ref 3.4–5)
ALBUMIN/GLOB SERPL: 1.2 {RATIO} (ref 0.8–1.7)
ALP SERPL-CCNC: 96 U/L (ref 45–117)
ALT SERPL-CCNC: 51 U/L (ref 16–61)
ANION GAP SERPL CALC-SCNC: 9 MMOL/L (ref 3–18)
AST SERPL-CCNC: 64 U/L (ref 15–37)
BASOPHILS # BLD: 0 K/UL (ref 0–0.1)
BASOPHILS NFR BLD: 0 % (ref 0–2)
BILIRUB SERPL-MCNC: 0.8 MG/DL (ref 0.2–1)
BUN SERPL-MCNC: 11 MG/DL (ref 7–18)
BUN/CREAT SERPL: 12 (ref 12–20)
CALCIUM SERPL-MCNC: 8.3 MG/DL (ref 8.5–10.1)
CHLORIDE SERPL-SCNC: 95 MMOL/L (ref 100–108)
CO2 SERPL-SCNC: 28 MMOL/L (ref 21–32)
CREAT SERPL-MCNC: 0.93 MG/DL (ref 0.6–1.3)
DIFFERENTIAL METHOD BLD: ABNORMAL
EOSINOPHIL # BLD: 0 K/UL (ref 0–0.4)
EOSINOPHIL NFR BLD: 1 % (ref 0–5)
ERYTHROCYTE [DISTWIDTH] IN BLOOD BY AUTOMATED COUNT: 12.7 % (ref 11.6–14.5)
GLOBULIN SER CALC-MCNC: 2.8 G/DL (ref 2–4)
GLUCOSE BLD STRIP.AUTO-MCNC: 288 MG/DL (ref 70–110)
GLUCOSE BLD STRIP.AUTO-MCNC: 381 MG/DL (ref 70–110)
GLUCOSE BLD STRIP.AUTO-MCNC: 413 MG/DL (ref 70–110)
GLUCOSE BLD STRIP.AUTO-MCNC: 436 MG/DL (ref 70–110)
GLUCOSE SERPL-MCNC: 676 MG/DL (ref 74–99)
HCT VFR BLD AUTO: 42.2 % (ref 36–48)
HGB BLD-MCNC: 14.5 G/DL (ref 13–16)
LACTATE SERPL-SCNC: 1.2 MMOL/L (ref 0.4–2)
LYMPHOCYTES # BLD: 1 K/UL (ref 0.9–3.6)
LYMPHOCYTES NFR BLD: 32 % (ref 21–52)
MCH RBC QN AUTO: 28.9 PG (ref 24–34)
MCHC RBC AUTO-ENTMCNC: 34.4 G/DL (ref 31–37)
MCV RBC AUTO: 84.2 FL (ref 74–97)
MONOCYTES # BLD: 0.3 K/UL (ref 0.05–1.2)
MONOCYTES NFR BLD: 10 % (ref 3–10)
NEUTS SEG # BLD: 1.8 K/UL (ref 1.8–8)
NEUTS SEG NFR BLD: 57 % (ref 40–73)
PLATELET # BLD AUTO: 190 K/UL (ref 135–420)
PMV BLD AUTO: 10.4 FL (ref 9.2–11.8)
POTASSIUM SERPL-SCNC: 4.1 MMOL/L (ref 3.5–5.5)
PROT SERPL-MCNC: 6.2 G/DL (ref 6.4–8.2)
RBC # BLD AUTO: 5.01 M/UL (ref 4.7–5.5)
SODIUM SERPL-SCNC: 132 MMOL/L (ref 136–145)
WBC # BLD AUTO: 3.1 K/UL (ref 4.6–13.2)

## 2019-04-04 PROCEDURE — 74011636637 HC RX REV CODE- 636/637: Performed by: PHYSICIAN ASSISTANT

## 2019-04-04 PROCEDURE — 80053 COMPREHEN METABOLIC PANEL: CPT

## 2019-04-04 PROCEDURE — 85025 COMPLETE CBC W/AUTO DIFF WBC: CPT

## 2019-04-04 PROCEDURE — 74011250636 HC RX REV CODE- 250/636: Performed by: PHYSICIAN ASSISTANT

## 2019-04-04 PROCEDURE — 83605 ASSAY OF LACTIC ACID: CPT

## 2019-04-04 PROCEDURE — 82962 GLUCOSE BLOOD TEST: CPT

## 2019-04-04 PROCEDURE — 96376 TX/PRO/DX INJ SAME DRUG ADON: CPT

## 2019-04-04 PROCEDURE — 87040 BLOOD CULTURE FOR BACTERIA: CPT

## 2019-04-04 PROCEDURE — 96361 HYDRATE IV INFUSION ADD-ON: CPT

## 2019-04-04 PROCEDURE — 96374 THER/PROPH/DIAG INJ IV PUSH: CPT

## 2019-04-04 PROCEDURE — 99284 EMERGENCY DEPT VISIT MOD MDM: CPT

## 2019-04-04 RX ORDER — NAPHAZOLINE HYDROCHLORIDE AND POLYETHYLENE GLYCOL 300 .1; 2 MG/ML; MG/ML
1 SOLUTION/ DROPS OPHTHALMIC
Qty: 30 SYRINGE | Refills: 0 | Status: SHIPPED | OUTPATIENT
Start: 2019-04-04

## 2019-04-04 RX ADMIN — HUMAN INSULIN 5 UNITS: 100 INJECTION, SOLUTION SUBCUTANEOUS at 18:09

## 2019-04-04 RX ADMIN — SODIUM CHLORIDE 1000 ML: 900 INJECTION, SOLUTION INTRAVENOUS at 18:37

## 2019-04-04 RX ADMIN — SODIUM CHLORIDE 1000 ML: 900 INJECTION, SOLUTION INTRAVENOUS at 17:16

## 2019-04-04 RX ADMIN — HUMAN INSULIN 10 UNITS: 100 INJECTION, SOLUTION SUBCUTANEOUS at 17:33

## 2019-04-04 NOTE — DISCHARGE INSTRUCTIONS
Learning About High Blood Sugar  What is high blood sugar? Your body turns the food you eat into glucose (sugar), which it uses for energy. But if your body isn't able to use the sugar right away, it can build up in your blood and lead to high blood sugar. When the amount of sugar in your blood stays too high for too much of the time, you may have diabetes. Diabetes is a disease that can cause serious health problems. The good news is that lifestyle changes may help you get your blood sugar back to normal and avoid or delay diabetes. What causes high blood sugar? Sugar (glucose) can build up in your blood if you:  · Are overweight. · Have a family history of diabetes. · Take certain medicines, such as steroids. What are the symptoms? Having high blood sugar may not cause any symptoms at all. Or it may make you feel very thirsty or very hungry. You may also urinate more often than usual, have blurry vision, or lose weight without trying. How is high blood sugar treated? You can take steps to lower your blood sugar level if you understand what makes it get higher. Your doctor may want you to learn how to test your blood sugar level at home. Then you can see how illness, stress, or different kinds of food or medicine raise or lower your blood sugar level. Other tests may be needed to see if you have diabetes. How can you prevent high blood sugar? · Watch your weight. If you're overweight, losing just a small amount of weight may help. Reducing fat around your waist is most important. · Limit the amount of calories, sweets, and unhealthy fat you eat. Ask your doctor if a dietitian can help you. A registered dietitian can help you create meal plans that fit your lifestyle. · Get at least 30 minutes of exercise on most days of the week. Exercise helps control your blood sugar. It also helps you maintain a healthy weight. Walking is a good choice.  You also may want to do other activities, such as running, swimming, cycling, or playing tennis or team sports. · If your doctor prescribed medicines, take them exactly as prescribed. Call your doctor if you think you are having a problem with your medicine. You will get more details on the specific medicines your doctor prescribes. Follow-up care is a key part of your treatment and safety. Be sure to make and go to all appointments, and call your doctor if you are having problems. It's also a good idea to know your test results and keep a list of the medicines you take. Where can you learn more? Go to http://susi-justin.info/. Enter O108 in the search box to learn more about \"Learning About High Blood Sugar. \"  Current as of: July 25, 2018  Content Version: 11.9  © 0629-2061 [employer]. Care instructions adapted under license by [employer] (which disclaims liability or warranty for this information). If you have questions about a medical condition or this instruction, always ask your healthcare professional. [address] any warranty or liability for your use of this information.

## 2019-04-04 NOTE — CALL BACK NOTE
3:01 PM 
04/04/19 Second attempt at contacting pt made. Message left again. Called pts mother and asked to have pt call the ED. Was admitted for DKA and discharged yesterday. Patient with positive blood cultures. Specimen Information: Blood  
    
Component Value Flag Ref Range Units Status Special Requests:      Preliminary NO SPECIAL REQUESTS   
GRAM STAIN      Preliminary GRAM POSITIVE COCCI IN CLUSTERS ANAEROBIC BOTTLE   
GRAM STAIN      Preliminary SMEAR CALLED TO AND CORRECTLY REPEATED BY: Iwona Matos RN ER, TO [facility] AT 7107 4/3/2019 Culture result:      Preliminary CULTURE IN PROGRESS,FURTHER UPDATES TO FOLLOW Culture result:      Preliminary Sent to [facility] for ID/Susceptibility if indicated Layne Mason PA-C

## 2019-04-04 NOTE — ED PROVIDER NOTES
EMERGENCY DEPARTMENT HISTORY AND PHYSICAL EXAM 
 
Date: 4/4/2019 Patient Name: John Cordero History of Presenting Illness Chief Complaint Patient presents with  Abnormal Lab Results History Provided By: Patient Chief Complaint: + blood cultures Additional History (Context):  
4:08 PM 
John Cordero is a 24 y.o. male with PMHX DM type 1 presents to the emergency department after being called regarding positive blood cultures from recent visit on 331. At that time he was admitted for DKA. He had elevated white blood cell count and lactic acid. He was discharged yesterday no antibiotics. Patient states he has been feeling better but had some abdominal pain earlier today. Denies nausea vomiting and diarrhea. States he did check his blood sugar before coming in which read HI.   
 
PCP: Felicia White MD 
 
Current Outpatient Medications Medication Sig Dispense Refill  insulin syringe-needle U-100 (BD INSULIN SYRINGE SAFETYGLIDE) 1 mL 29 gauge x 1/2\" syrg 1 Syringe by Does Not Apply route Before breakfast, lunch, and dinner. 30 Syringe 0  
 lancets-blood glucose strips 30 gauge cmpk 1 Each by Does Not Apply route four (4) times daily. 1 Dose Pack 0  
 insulin lispro (HUMALOG U-100 INSULIN) 100 unit/mL injection Use as directed with insulin pump 1 Vial 0  Blood-Glucose Meter monitoring kit Use as directed. 1 Kit 0  
 fluoxetine HCl (PROZAC PO) Take 20 mg by mouth daily.  RANITIDINE HCL PO Take  by mouth.  promethazine HCl (PROMETHAZINE PO) Take  by mouth.  AMPHET ASP/AMPHET/D-AMPHET (ADDERALL PO) Take  by mouth.   insulin pump (PATIENT SUPPLIED) Misc by SubCUTAneous route as needed. Past History Past Medical History: 
Past Medical History:  
Diagnosis Date  ADD (attention deficit disorder)  Diabetes ([facility]  Insomnia  Seizure ([facility] Past Surgical History: 
History reviewed. No pertinent surgical history. Family History: 
History reviewed. No pertinent family history. Social History: 
Social History Tobacco Use  Smoking status: Never Smoker  Smokeless tobacco: Current User Substance Use Topics  Alcohol use: Yes Alcohol/week: 9.6 oz Types: 10 Cans of beer, 6 Shots of liquor per week  Drug use: Not Currently Allergies: 
No Known Allergies Review of Systems Review of Systems Constitutional: Negative for chills and fever. Gastrointestinal: Positive for abdominal pain. Negative for diarrhea, nausea and vomiting. All other systems reviewed and are negative. Physical Exam  
 
Vitals:  
 04/04/19 1605 04/04/19 1923 BP: 159/85 (!) 134/92 Pulse: 92 89 Resp: 20 16 Temp: 98.3 °F (36.8 °C) 97.8 °F (36.6 °C) SpO2: 100% 100% Weight: 77.1 kg (170 lb) Height: 5' 11\" (1.803 m) Physical Exam  
Constitutional: He is oriented to person, place, and time. He appears well-developed and well-nourished. Alert, well appearing, non toxic, NAD, sitting up on stretcher HENT:  
Head: Normocephalic and atraumatic. Neck: Normal range of motion. Neck supple. Cardiovascular: Normal rate, regular rhythm, normal heart sounds and intact distal pulses. No murmur heard. Pulmonary/Chest: Effort normal and breath sounds normal. No respiratory distress. He has no wheezes. He has no rales. Abdominal: Soft. Bowel sounds are normal. He exhibits no distension. There is no tenderness. There is no rebound and no guarding. abd non tender Neurological: He is alert and oriented to person, place, and time. Skin: Skin is warm and dry. Psychiatric: He has a normal mood and affect. Judgment normal.  
Nursing note and vitals reviewed. Diagnostic Study Results Labs: 
  
Recent Results (from the past 12 hour(s)) CBC WITH AUTOMATED DIFF Collection Time: 04/04/19  4:15 PM  
Result Value Ref Range WBC 3.1 (L) 4.6 - 13.2 K/uL  
 RBC 5.01 4.70 - 5.50 M/uL HGB 14.5 13.0 - 16.0 g/dL HCT 42.2 36.0 - 48.0 % MCV 84.2 74.0 - 97.0 FL  
 MCH 28.9 24.0 - 34.0 PG  
 MCHC 34.4 31.0 - 37.0 g/dL  
 RDW 12.7 11.6 - 14.5 % PLATELET 411 397 - 599 K/uL MPV 10.4 9.2 - 11.8 FL  
 NEUTROPHILS 57 40 - 73 % LYMPHOCYTES 32 21 - 52 % MONOCYTES 10 3 - 10 % EOSINOPHILS 1 0 - 5 % BASOPHILS 0 0 - 2 %  
 ABS. NEUTROPHILS 1.8 1.8 - 8.0 K/UL  
 ABS. LYMPHOCYTES 1.0 0.9 - 3.6 K/UL  
 ABS. MONOCYTES 0.3 0.05 - 1.2 K/UL  
 ABS. EOSINOPHILS 0.0 0.0 - 0.4 K/UL  
 ABS. BASOPHILS 0.0 0.0 - 0.1 K/UL  
 DF AUTOMATED METABOLIC PANEL, COMPREHENSIVE Collection Time: 04/04/19  4:15 PM  
Result Value Ref Range Sodium 132 (L) 136 - 145 mmol/L Potassium 4.1 3.5 - 5.5 mmol/L Chloride 95 (L) 100 - 108 mmol/L  
 CO2 28 21 - 32 mmol/L Anion gap 9 3.0 - 18 mmol/L Glucose 676 (HH) 74 - 99 mg/dL BUN 11 7.0 - 18 MG/DL Creatinine 0.93 0.6 - 1.3 MG/DL  
 BUN/Creatinine ratio 12 12 - 20 GFR est AA >60 >60 ml/min/1.73m2 GFR est non-AA >60 >60 ml/min/1.73m2 Calcium 8.3 (L) 8.5 - 10.1 MG/DL Bilirubin, total 0.8 0.2 - 1.0 MG/DL  
 ALT (SGPT) 51 16 - 61 U/L  
 AST (SGOT) 64 (H) 15 - 37 U/L Alk. phosphatase 96 45 - 117 U/L Protein, total 6.2 (L) 6.4 - 8.2 g/dL Albumin 3.4 3.4 - 5.0 g/dL Globulin 2.8 2.0 - 4.0 g/dL A-G Ratio 1.2 0.8 - 1.7 LACTIC ACID Collection Time: 04/04/19  4:15 PM  
Result Value Ref Range Lactic acid 1.2 0.4 - 2.0 MMOL/L  
GLUCOSE, POC Collection Time: 04/04/19  5:57 PM  
Result Value Ref Range Glucose (POC) 413 (HH) 70 - 110 mg/dL GLUCOSE, POC Collection Time: 04/04/19  5:58 PM  
Result Value Ref Range Glucose (POC) 436 (HH) 70 - 110 mg/dL GLUCOSE, POC Collection Time: 04/04/19  6:32 PM  
Result Value Ref Range Glucose (POC) 381 (H) 70 - 110 mg/dL GLUCOSE, POC Collection Time: 04/04/19  7:15 PM  
Result Value Ref Range Glucose (POC) 288 (H) 70 - 110 mg/dL Radiologic Studies: No orders to display CT Results  (Last 48 hours) None CXR Results  (Last 48 hours) None Medical Decision Making I am the first provider for this patient. I reviewed the vital signs, available nursing notes, past medical history, past surgical history, family history and social history. Vital Signs: Reviewed the patient's vital signs. Pulse Oximetry Analysis: 100% on room air Records Reviewed: Nursing Notes and Old Medical Records Procedures: 
Procedures ED Course:  
4:08 PM Initial assessment performed. The patients presenting problems have been discussed, and they are in agreement with the care plan formulated and outlined with them. I have encouraged them to ask questions as they arise throughout their visit. 5:40 PM Discussed patient's history, exam, and available diagnostics results with Kami Young DO, ED attending, who agree with plan and consult to . 5:43 PM Discussed patient's history, exam, and available diagnostics results with Malachi Rao, , will speak to diabetic  tomorrow and follow up on the patient to get him close follow up.  
 
 
5:49 PM 
Pt removed insulin pump and sts it appears kinked up at the tip. He replaced to the tip last night and thinks that that is when it may have gotten kinked and he had not been getting any insulin today. States he is able to replace the kinked portion when he gets home. Discussion: 
Patient was recently seen and admitted for DKA at which time he met Sirs criteria without any source of infection. Blood cultures resulted and patient was asked to return to the ED. On exam he is well-appearing nontoxic afebrile no acute distress. His blood sugar was found to be in the 600s but not in DKA. He was given IV saline and insulin and lowered to 288. Patient feels that his insulin pump may be malfunctioning. Diabetes: Will contact patient tomorrow.   Patient was given insulin syringes, states he has enough short acting insulin at home in case his insulin pump continues to malfunction. Advised to follow blood sugar very closely. Today he has no elevated white count or fever blood cultures were repeated however patient is stable for discharge. Case discussed with ED attending who agrees with plan. . Strict return precautions given, pt offering no questions or complaints. Diagnosis and Disposition DISCHARGE NOTE: 
7:37PM  
Tiffanie Lopez  results have been reviewed with him. He has been counseled regarding his diagnosis, treatment, and plan. He verbally conveys understanding and agreement of the signs, symptoms, diagnosis, treatment and prognosis and additionally agrees to follow up as discussed. He also agrees with the care-plan and conveys that all of his questions have been answered. I have also provided discharge instructions for him that include: educational information regarding their diagnosis and treatment, and list of reasons why they would want to return to the ED prior to their follow-up appointment, should his condition change. He has been provided with education for proper emergency department utilization. CLINICAL IMPRESSION: 
 
1. Hyperglycemia PLAN: 
1. D/C Home 2. Discharge Medication List as of 4/4/2019  7:26 PM  
  
 
3. Follow-up Information Follow up With Specialties Details Why Contact Info Geean Sewell MD Family Practice  call for follow up  [address] [facility] [address] [place] [address] 
[phone] [facility] EMERGENCY DEPT Emergency Medicine  If symptoms worsen [address] [address] 
[phone]

## 2019-04-04 NOTE — CALL BACK NOTE
3:35 PM 
04/04/19 Pt returned call. sts his stomach is still upset. informed of + blood culture and advised to return to the ED.   
 
 
Emili Tavares PA-C

## 2019-04-04 NOTE — ED TRIAGE NOTES
Patient states that he was seen here yesterday and was called to come back because they found bacteria in his blood.

## 2019-04-04 NOTE — CALL BACK NOTE
1:50 AM 
04/04/19 Attempted to contact pt regarding + blood culture. No answer message left to return call to ED. Specimen Information: Blood  
    
Component Value Flag Ref Range Units Status Special Requests:      Preliminary NO SPECIAL REQUESTS   
GRAM STAIN      Preliminary GRAM POSITIVE COCCI IN CLUSTERS ANAEROBIC BOTTLE   
GRAM STAIN      Preliminary SMEAR CALLED TO AND CORRECTLY REPEATED BY: Syeda Hough RN ER, TO [facility] AT 1820 4/3/2019 Culture result:      Preliminary CULTURE IN PROGRESS,FURTHER UPDATES TO FOLLOW Culture result:      Preliminary Sent to [facility] for ID/Susceptibility if indicated Tyrell Barrett PA-C

## 2019-04-04 NOTE — ED NOTES
Verbal report given to [facility] RN, all questions answered at this time

## 2019-04-06 LAB
BACTERIA SPEC CULT: ABNORMAL
BACTERIA SPEC CULT: NORMAL
GRAM STN SPEC: ABNORMAL
GRAM STN SPEC: ABNORMAL
SERVICE CMNT-IMP: ABNORMAL
SERVICE CMNT-IMP: NORMAL

## 2019-04-10 LAB
BACTERIA SPEC CULT: NORMAL
BACTERIA SPEC CULT: NORMAL
SERVICE CMNT-IMP: NORMAL
SERVICE CMNT-IMP: NORMAL

## 2019-05-14 LAB
ATRIAL RATE: 118 BPM
CALCULATED P AXIS, ECG09: 73 DEGREES
CALCULATED R AXIS, ECG10: 83 DEGREES
CALCULATED T AXIS, ECG11: 43 DEGREES
DIAGNOSIS, 93000: NORMAL
P-R INTERVAL, ECG05: 158 MS
Q-T INTERVAL, ECG07: 318 MS
QRS DURATION, ECG06: 98 MS
QTC CALCULATION (BEZET), ECG08: 445 MS
VENTRICULAR RATE, ECG03: 118 BPM

## 2019-08-04 ENCOUNTER — HOSPITAL ENCOUNTER (EMERGENCY)
Age: 24
Discharge: HOME | End: 2019-08-04
Attending: EMERGENCY MEDICINE
Payer: COMMERCIAL

## 2019-08-04 ENCOUNTER — APPOINTMENT (OUTPATIENT)
Dept: CT IMAGING | Age: 24
End: 2019-08-04
Attending: PHYSICIAN ASSISTANT
Payer: COMMERCIAL

## 2019-08-04 ENCOUNTER — APPOINTMENT (OUTPATIENT)
Dept: GENERAL RADIOLOGY | Age: 24
End: 2019-08-04
Attending: PHYSICIAN ASSISTANT
Payer: COMMERCIAL

## 2019-08-04 VITALS
HEIGHT: 71 IN | SYSTOLIC BLOOD PRESSURE: 115 MMHG | TEMPERATURE: 97.7 F | RESPIRATION RATE: 17 BRPM | HEART RATE: 89 BPM | OXYGEN SATURATION: 96 % | DIASTOLIC BLOOD PRESSURE: 52 MMHG | BODY MASS INDEX: 24.5 KG/M2 | WEIGHT: 175 LBS

## 2019-08-04 DIAGNOSIS — Z86.39 HISTORY OF TYPE 1 DIABETES MELLITUS: ICD-10-CM

## 2019-08-04 DIAGNOSIS — R56.9 SEIZURE (HCC): Primary | ICD-10-CM

## 2019-08-04 DIAGNOSIS — E87.6 HYPOKALEMIA: ICD-10-CM

## 2019-08-04 DIAGNOSIS — T85.614A BREAKDOWN (MECHANICAL) OF INSULIN PUMP, INITIAL ENCOUNTER: ICD-10-CM

## 2019-08-04 DIAGNOSIS — F10.90 HEAVY ALCOHOL USE: ICD-10-CM

## 2019-08-04 LAB
ALBUMIN SERPL-MCNC: 4.3 G/DL (ref 3.4–5)
ALBUMIN/GLOB SERPL: 1.4 {RATIO} (ref 0.8–1.7)
ALP SERPL-CCNC: 94 U/L (ref 45–117)
ALT SERPL-CCNC: 33 U/L (ref 16–61)
AMPHET UR QL SCN: NEGATIVE
ANION GAP SERPL CALC-SCNC: 18 MMOL/L (ref 3–18)
APPEARANCE UR: CLEAR
AST SERPL-CCNC: 25 U/L (ref 10–38)
BACTERIA URNS QL MICRO: NEGATIVE /HPF
BARBITURATES UR QL SCN: NEGATIVE
BASOPHILS # BLD: 0 K/UL (ref 0–0.1)
BASOPHILS NFR BLD: 0 % (ref 0–2)
BENZODIAZ UR QL: NEGATIVE
BILIRUB SERPL-MCNC: 1.3 MG/DL (ref 0.2–1)
BILIRUB UR QL: NEGATIVE
BUN SERPL-MCNC: 9 MG/DL (ref 7–18)
BUN/CREAT SERPL: 9 (ref 12–20)
CALCIUM SERPL-MCNC: 9 MG/DL (ref 8.5–10.1)
CANNABINOIDS UR QL SCN: NEGATIVE
CHLORIDE SERPL-SCNC: 106 MMOL/L (ref 100–111)
CO2 SERPL-SCNC: 19 MMOL/L (ref 21–32)
COCAINE UR QL SCN: NEGATIVE
COLOR UR: YELLOW
CREAT SERPL-MCNC: 0.98 MG/DL (ref 0.6–1.3)
DIFFERENTIAL METHOD BLD: ABNORMAL
EOSINOPHIL # BLD: 0.1 K/UL (ref 0–0.4)
EOSINOPHIL NFR BLD: 1 % (ref 0–5)
EPITH CASTS URNS QL MICRO: NORMAL /LPF (ref 0–5)
ERYTHROCYTE [DISTWIDTH] IN BLOOD BY AUTOMATED COUNT: 12.2 % (ref 11.6–14.5)
EST. AVERAGE GLUCOSE BLD GHB EST-MCNC: 200 MG/DL
ETHANOL SERPL-MCNC: <3 MG/DL (ref 0–3)
GLOBULIN SER CALC-MCNC: 3.1 G/DL (ref 2–4)
GLUCOSE BLD STRIP.AUTO-MCNC: 101 MG/DL (ref 70–110)
GLUCOSE SERPL-MCNC: 89 MG/DL (ref 74–99)
GLUCOSE UR STRIP.AUTO-MCNC: NEGATIVE MG/DL
HBA1C MFR BLD: 8.6 % (ref 4.2–5.6)
HCT VFR BLD AUTO: 46.7 % (ref 36–48)
HDSCOM,HDSCOM: NORMAL
HGB BLD-MCNC: 16.3 G/DL (ref 13–16)
HGB UR QL STRIP: NEGATIVE
KETONES UR QL STRIP.AUTO: ABNORMAL MG/DL
LEUKOCYTE ESTERASE UR QL STRIP.AUTO: NEGATIVE
LYMPHOCYTES # BLD: 2.8 K/UL (ref 0.9–3.6)
LYMPHOCYTES NFR BLD: 28 % (ref 21–52)
MAGNESIUM SERPL-MCNC: 2.7 MG/DL (ref 1.6–2.6)
MCH RBC QN AUTO: 29.1 PG (ref 24–34)
MCHC RBC AUTO-ENTMCNC: 34.9 G/DL (ref 31–37)
MCV RBC AUTO: 83.4 FL (ref 74–97)
METHADONE UR QL: NEGATIVE
MONOCYTES # BLD: 0.8 K/UL (ref 0.05–1.2)
MONOCYTES NFR BLD: 8 % (ref 3–10)
NEUTS SEG # BLD: 6.4 K/UL (ref 1.8–8)
NEUTS SEG NFR BLD: 63 % (ref 40–73)
NITRITE UR QL STRIP.AUTO: NEGATIVE
OPIATES UR QL: NEGATIVE
PCP UR QL: NEGATIVE
PH UR STRIP: 5.5 [PH] (ref 5–8)
PLATELET # BLD AUTO: 264 K/UL (ref 135–420)
PMV BLD AUTO: 11.1 FL (ref 9.2–11.8)
POTASSIUM SERPL-SCNC: 3.3 MMOL/L (ref 3.5–5.5)
PROT SERPL-MCNC: 7.4 G/DL (ref 6.4–8.2)
PROT UR STRIP-MCNC: ABNORMAL MG/DL
RBC # BLD AUTO: 5.6 M/UL (ref 4.7–5.5)
RBC #/AREA URNS HPF: NORMAL /HPF (ref 0–5)
SODIUM SERPL-SCNC: 143 MMOL/L (ref 136–145)
SP GR UR REFRACTOMETRY: 1.02 (ref 1–1.03)
UROBILINOGEN UR QL STRIP.AUTO: 1 EU/DL (ref 0.2–1)
WBC # BLD AUTO: 10.2 K/UL (ref 4.6–13.2)
WBC URNS QL MICRO: NORMAL /HPF (ref 0–5)

## 2019-08-04 PROCEDURE — 96374 THER/PROPH/DIAG INJ IV PUSH: CPT

## 2019-08-04 PROCEDURE — 85025 COMPLETE CBC W/AUTO DIFF WBC: CPT

## 2019-08-04 PROCEDURE — 96361 HYDRATE IV INFUSION ADD-ON: CPT

## 2019-08-04 PROCEDURE — 74011250637 HC RX REV CODE- 250/637: Performed by: PHYSICIAN ASSISTANT

## 2019-08-04 PROCEDURE — 83735 ASSAY OF MAGNESIUM: CPT

## 2019-08-04 PROCEDURE — 99285 EMERGENCY DEPT VISIT HI MDM: CPT

## 2019-08-04 PROCEDURE — 80053 COMPREHEN METABOLIC PANEL: CPT

## 2019-08-04 PROCEDURE — 80307 DRUG TEST PRSMV CHEM ANLYZR: CPT

## 2019-08-04 PROCEDURE — 70450 CT HEAD/BRAIN W/O DYE: CPT

## 2019-08-04 PROCEDURE — 83036 HEMOGLOBIN GLYCOSYLATED A1C: CPT

## 2019-08-04 PROCEDURE — 74022 RADEX COMPL AQT ABD SERIES: CPT

## 2019-08-04 PROCEDURE — 82962 GLUCOSE BLOOD TEST: CPT

## 2019-08-04 PROCEDURE — 81001 URINALYSIS AUTO W/SCOPE: CPT

## 2019-08-04 PROCEDURE — 74011250636 HC RX REV CODE- 250/636: Performed by: PHYSICIAN ASSISTANT

## 2019-08-04 RX ORDER — FAMOTIDINE 10 MG/ML
20 INJECTION INTRAVENOUS
Status: COMPLETED | OUTPATIENT
Start: 2019-08-04 | End: 2019-08-04

## 2019-08-04 RX ORDER — POTASSIUM CHLORIDE 750 MG/1
20 TABLET, FILM COATED, EXTENDED RELEASE ORAL DAILY
Qty: 14 TAB | Refills: 0 | Status: SHIPPED | OUTPATIENT
Start: 2019-08-04 | End: 2019-08-11

## 2019-08-04 RX ADMIN — FAMOTIDINE 20 MG: 10 INJECTION, SOLUTION INTRAVENOUS at 14:42

## 2019-08-04 RX ADMIN — SODIUM CHLORIDE 1000 ML: 900 INJECTION, SOLUTION INTRAVENOUS at 14:40

## 2019-08-04 RX ADMIN — POTASSIUM BICARBONATE 60 MEQ: 782 TABLET, EFFERVESCENT ORAL at 16:46

## 2019-08-04 NOTE — ED TRIAGE NOTES
Patient arrives via EMS from home residence d/t x1 episode of seizure activity. Patient with PMHx of seizure. Patient DM Type 1 diabetic with known insulin pump.    Per EMS, patient reports ETOH last night  EMS obtained FSBS 97 mg/dL

## 2019-08-04 NOTE — ED NOTES
Girlfriend at bedside, who witnessed seizure activity reports patient was normal at 1000 this morning, but attempted to wake patient up from a nap prior to ED admission when patient became altered and had seizure activity. Patient with PMHx of seizure activity d/t DKA. Mother reports that insulin pump may have been pumping excessive amounts of insulin throughout the night.  Insulin pump off of patient d/t alarming at this time and mother on phone with company

## 2019-08-04 NOTE — ED PROVIDER NOTES
EMERGENCY DEPARTMENT HISTORY AND PHYSICAL EXAM    Date: 8/4/2019  Patient Name: Antony Hollins    History of Presenting Illness     Chief Complaint   Patient presents with    Seizure         History Provided By: Patient    Chief Complaint: seizure    HPI(Context):   2:23 PM  Antony Hollins is a 21 y.o. male with PMHX of seizures, DMI, and ADD who presents to the emergency department C/O seizure. Girlfriend at bedside witnesses seizure just PTA in which pt was \"convulsing in bed. \" Resolved prior to medics arrival. Pt endorses urinary incontinence. No tongue biting. Pt states he drank 8 shots of Willena Clore last night but denies illicit drug use. Pt is post-ictal in ED and states he feels like he had a seizure. Last seizure was several years ago when his BS dropped. Pt has insulin pump that pt's mother feel is not functional. Pt does have humalog and lantus at home. Pt denies nausea, vomiting, chest pain, SOB, headache, neck pain, back pain, joint pain, fever, chills, and any other sxs or complaints. PCP: Aliyah Napoles MD    Current Outpatient Medications   Medication Sig Dispense Refill    potassium chloride SR (KLOR-CON 10) 10 mEq tablet Take 2 Tabs by mouth daily for 7 days. Indications: low amount of potassium in the blood 14 Tab 0    insulin syringe-needle U-100 (BD INSULIN SYRINGE SAFETYGLIDE) 1 mL 29 gauge x 1/2\" syrg 1 Syringe by Does Not Apply route Before breakfast, lunch, and dinner. 30 Syringe 0    lancets-blood glucose strips 30 gauge cmpk 1 Each by Does Not Apply route four (4) times daily. 1 Dose Pack 0    insulin lispro (HUMALOG U-100 INSULIN) 100 unit/mL injection Use as directed with insulin pump 1 Vial 0    Blood-Glucose Meter monitoring kit Use as directed. 1 Kit 0    fluoxetine HCl (PROZAC PO) Take 20 mg by mouth daily.  RANITIDINE HCL PO Take  by mouth.  promethazine HCl (PROMETHAZINE PO) Take  by mouth.       AMPHET ASP/AMPHET/D-AMPHET (ADDERALL PO) Take by mouth.   insulin pump (PATIENT SUPPLIED) [facility] by SubCUTAneous route as needed. Past History     Past Medical History:  Past Medical History:   Diagnosis Date    ADD (attention deficit disorder)     Diabetes ([facility] [facility]     Insomnia     Seizure ([facility] [facility]        Past Surgical History:  History reviewed. No pertinent surgical history. Family History:  History reviewed. No pertinent family history. Social History:  Social History     Tobacco Use    Smoking status: Never Smoker    Smokeless tobacco: Former User   Substance Use Topics    Alcohol use: Yes     Alcohol/week: 16.0 standard drinks     Types: 10 Cans of beer, 6 Shots of liquor per week    Drug use: Not Currently       Allergies:  No Known Allergies      Review of Systems   Review of Systems   Constitutional: Negative for fever. HENT: Negative for facial swelling. Eyes: Negative for visual disturbance. Respiratory: Negative for shortness of breath. Cardiovascular: Negative for chest pain. Gastrointestinal: Negative for abdominal pain, nausea and vomiting. Musculoskeletal: Negative for back pain and neck pain. Neurological: Positive for seizures. Negative for dizziness, weakness, light-headedness and headaches. All other systems reviewed and are negative. Physical Exam     Vitals:    08/04/19 1428 08/04/19 1430 08/04/19 1600 08/04/19 1630   BP: 110/75 112/68 127/73 115/52   Pulse: 99 99 94 89   Resp: 14 25 22 17   Temp:       SpO2: 98% 96% 98% 96%   Weight:       Height:         Physical Exam   Constitutional: He is oriented to person, place, and time. He appears well-developed and well-nourished. No distress.  male in NAD. Mildly post-ictal. Oriented to self and location. Confused on day of week. Follows directions. Slowly. Mother, father, and girlfriend at bedside. HENT:   Head: Normocephalic and atraumatic. Head is without raccoon's eyes, without Barth's sign, without abrasion and without contusion. Right Ear: External ear normal. No drainage or swelling. Tympanic membrane is not perforated, not erythematous and not bulging. No hemotympanum. Left Ear: External ear normal. No drainage or swelling. Tympanic membrane is not perforated, not erythematous and not bulging. No hemotympanum. Nose: Nose normal. No mucosal edema or rhinorrhea. Mouth/Throat: Uvula is midline and oropharynx is clear and moist. Mucous membranes are dry. No uvula swelling. Eyes: Pupils are equal, round, and reactive to light. Conjunctivae and EOM are normal. Right eye exhibits no discharge. Left eye exhibits no discharge. No scleral icterus. Neck: Normal range of motion and full passive range of motion without pain. No spinous process tenderness and no muscular tenderness present. No erythema and normal range of motion present. Cardiovascular: Normal rate, regular rhythm, normal heart sounds and intact distal pulses. Exam reveals no gallop and no friction rub. No murmur heard. Pulmonary/Chest: Effort normal and breath sounds normal. No accessory muscle usage. No tachypnea. No respiratory distress. He has no decreased breath sounds. He has no wheezes. He has no rhonchi. He has no rales. Musculoskeletal: Normal range of motion. He exhibits no edema or tenderness. Thoracic back: He exhibits normal range of motion, no tenderness, no bony tenderness, no swelling, no deformity (no step off) and no pain. Lumbar back: He exhibits normal range of motion, no tenderness, no bony tenderness, no swelling, no deformity (no step off), no pain and no spasm. Neurological: He is alert and oriented to person, place, and time. He has normal strength. No cranial nerve deficit or sensory deficit. Coordination normal. GCS eye subscore is 4. GCS verbal subscore is 5. GCS motor subscore is 6. Skin: Skin is warm and dry. He is not diaphoretic. Psychiatric: He has a normal mood and affect.  Judgment normal.   Nursing note and vitals reviewed. Diagnostic Study Results     Labs -     Recent Results (from the past 12 hour(s))   CBC WITH AUTOMATED DIFF    Collection Time: 08/04/19  1:56 PM   Result Value Ref Range    WBC 10.2 4.6 - 13.2 K/uL    RBC 5.60 (H) 4.70 - 5.50 M/uL    HGB 16.3 (H) 13.0 - 16.0 g/dL    HCT 46.7 36.0 - 48.0 %    MCV 83.4 74.0 - 97.0 FL    MCH 29.1 24.0 - 34.0 PG    MCHC 34.9 31.0 - 37.0 g/dL    RDW 12.2 11.6 - 14.5 %    PLATELET 502 407 - 996 K/uL    MPV 11.1 9.2 - 11.8 FL    NEUTROPHILS 63 40 - 73 %    LYMPHOCYTES 28 21 - 52 %    MONOCYTES 8 3 - 10 %    EOSINOPHILS 1 0 - 5 %    BASOPHILS 0 0 - 2 %    ABS. NEUTROPHILS 6.4 1.8 - 8.0 K/UL    ABS. LYMPHOCYTES 2.8 0.9 - 3.6 K/UL    ABS. MONOCYTES 0.8 0.05 - 1.2 K/UL    ABS. EOSINOPHILS 0.1 0.0 - 0.4 K/UL    ABS. BASOPHILS 0.0 0.0 - 0.1 K/UL    DF AUTOMATED     METABOLIC PANEL, COMPREHENSIVE    Collection Time: 08/04/19  1:56 PM   Result Value Ref Range    Sodium 143 136 - 145 mmol/L    Potassium 3.3 (L) 3.5 - 5.5 mmol/L    Chloride 106 100 - 111 mmol/L    CO2 19 (L) 21 - 32 mmol/L    Anion gap 18 3.0 - 18 mmol/L    Glucose 89 74 - 99 mg/dL    BUN 9 7.0 - 18 MG/DL    Creatinine 0.98 0.6 - 1.3 MG/DL    BUN/Creatinine ratio 9 (L) 12 - 20      GFR est AA >60 >60 ml/min/1.73m2    GFR est non-AA >60 >60 ml/min/1.73m2    Calcium 9.0 8.5 - 10.1 MG/DL    Bilirubin, total 1.3 (H) 0.2 - 1.0 MG/DL    ALT (SGPT) 33 16 - 61 U/L    AST (SGOT) 25 10 - 38 U/L    Alk.  phosphatase 94 45 - 117 U/L    Protein, total 7.4 6.4 - 8.2 g/dL    Albumin 4.3 3.4 - 5.0 g/dL    Globulin 3.1 2.0 - 4.0 g/dL    A-G Ratio 1.4 0.8 - 1.7     HEMOGLOBIN A1C WITH EAG    Collection Time: 08/04/19  1:56 PM   Result Value Ref Range    Hemoglobin A1c 8.6 (H) 4.2 - 5.6 %    Est. average glucose 200 mg/dL   MAGNESIUM    Collection Time: 08/04/19  1:56 PM   Result Value Ref Range    Magnesium 2.7 (H) 1.6 - 2.6 mg/dL   ETHYL ALCOHOL    Collection Time: 08/04/19  1:56 PM   Result Value Ref Range ALCOHOL(ETHYL),SERUM <3 0 - 3 MG/DL   GLUCOSE, POC    Collection Time: 08/04/19  2:26 PM   Result Value Ref Range    Glucose (POC) 101 70 - 110 mg/dL   URINALYSIS W/ RFLX MICROSCOPIC    Collection Time: 08/04/19  3:30 PM   Result Value Ref Range    Color YELLOW      Appearance CLEAR      Specific gravity 1.016 1.005 - 1.030      pH (UA) 5.5 5.0 - 8.0      Protein TRACE (A) NEG mg/dL    Glucose NEGATIVE  NEG mg/dL    Ketone TRACE (A) NEG mg/dL    Bilirubin NEGATIVE  NEG      Blood NEGATIVE  NEG      Urobilinogen 1.0 0.2 - 1.0 EU/dL    Nitrites NEGATIVE  NEG      Leukocyte Esterase NEGATIVE  NEG     DRUG SCREEN, URINE    Collection Time: 08/04/19  3:30 PM   Result Value Ref Range    BENZODIAZEPINES NEGATIVE  NEG      BARBITURATES NEGATIVE  NEG      THC (TH-CANNABINOL) NEGATIVE  NEG      OPIATES NEGATIVE  NEG      PCP(PHENCYCLIDINE) NEGATIVE  NEG      COCAINE NEGATIVE  NEG      AMPHETAMINES NEGATIVE  NEG      METHADONE NEGATIVE  NEG      HDSCOM (NOTE)    URINE MICROSCOPIC ONLY    Collection Time: 08/04/19  3:30 PM   Result Value Ref Range    WBC 0 to 3 0 - 5 /hpf    RBC 0 to 3 0 - 5 /hpf    Epithelial cells FEW 0 - 5 /lpf    Bacteria NEGATIVE  NEG /hpf           CT HEAD WO CONT   Final Result   IMPRESSION:      No acute intracranial abnormalities. XR ABD ACUTE W 1 V CHEST   Final Result   IMPRESSION:      No acute osseous abnormality        CT Results  (Last 48 hours)               08/04/19 1511  CT HEAD WO CONT Final result    Impression:  IMPRESSION:       No acute intracranial abnormalities.        Narrative:  EXAM: CT head       CLINICAL HISTORY/INDICATION: Seizures new or progressive; seizure   -Additional: None       COMPARISON: 11/01/13       TECHNIQUE: Axial CT imaging of the head was performed without intravenous   contrast.  One or more dose reduction techniques were used on this CT: automated   exposure control, adjustment of the mAs and/or kVp according to patient size,   and iterative reconstruction techniques. The specific techniques used on this   CT exam have been documented in the patient's electronic medical record. Digital   Imaging and Communications in Medicine (DICOM) format image data are available   to nonaffiliated external healthcare facilities or entities on a secure, media   free, reciprocally searchable basis with patient authorization for at least a   12-month period after this study. _______________       FINDINGS:       Brain And Posterior Fossa: The sulci, folia, ventricles and basal cisterns are   within normal limits for the patient's age. There is no intracranial hemorrhage,   mass effect, or midline shift. There are no areas of abnormal parenchymal   attenuation. Extra-Axial Spaces And Meninges: There are no abnormal extra-axial fluid   collections. Calvarium: Intact. Sinuses: Clear. Other: None.       _______________               CXR Results  (Last 48 hours)               08/04/19 1504  XR ABD ACUTE W 1 V CHEST Final result    Impression:  IMPRESSION:       No acute osseous abnormality       Narrative:  EXAM:  Acute Abdominal Series       CLINICAL INDICATION: abdominal pain, seizure   -Additional: None       COMPARISON: CT abdomen and pelvis from 03/31/19, chest x-ray from 03/31/19       TECHNIQUE:  Supine and upright abdomen, upright chest        _______________       FINDINGS: The bowel gas pattern is unremarkable. There is no evidence of bowel   obstruction or ileus. No soft tissue masses are identified. No organomegaly is   present. There are no worrisome calcifications anywhere in the abdomen or   pelvis. There is no free air seen on the upright view. The cardiomediastinal   silhouette is normal. The lungs are clear. Incidental pelvic phleboliths are   present. There is chronic contour abnormality involving the lateral femoral   necks of both proximal femurs which can predispose a patient to develop femoral   acetabular impingement syndrome. _______________                 Medications given in the ED-  Medications   sodium chloride 0.9 % bolus infusion 1,000 mL (0 mL IntraVENous IV Completed 8/4/19 1636)   famotidine (PF) (PEPCID) injection 20 mg (20 mg IntraVENous Given 8/4/19 1442)   potassium bicarbonate (EFFER-K) tablet 60 mEq (60 mEq Oral Given 8/4/19 1646)         Medical Decision Making   I am the first provider for this patient. I reviewed the vital signs, available nursing notes, past medical history, past surgical history, family history and social history. Vital Signs-Reviewed the patient's vital signs. Pulse Oximetry Analysis - 96% on RA     Records Reviewed: Nursing Notes and Old Medical Records    Provider Notes (Medical Decision Making): seizure, hypoglycemia, metabolic derangement, DKA, dehydration    Procedures:  Procedures    ED Course:   2:23 PM Initial assessment performed. The patients presenting problems have been discussed, and they are in agreement with the care plan formulated and outlined with them. I have encouraged them to ask questions as they arise throughout their visit. Diagnosis and Disposition       4:04 PM Consult: I discussed care with Mary Valdivia, DO It was a standard discussion, including history of patients chief complaint, available diagnostic results, and treatment course. Agrees with plan for d/c and close FU. Feels better with tx. IVF given. K+ replaced in ED with PO supplement at home. Denies pain or complaints. Suspect combination of heavy ETOH and possibly malfunctioning insulin pump. Pt has subQ insulin for home use and has glucometer and strips. Pt has new insulin pump delivered Tuesday per mother. Discussed need for no ETOH. Reasons to RTED discussed with pt. All questions answered. Pt feels comfortable going home at this time. Pt expressed understanding and he agrees with plan. 1. Seizure ([address]    2. Hypokalemia    3. History of type 1 diabetes mellitus    4.  Breakdown (mechanical) of insulin pump, initial encounter    5. Heavy alcohol use        PLAN:  1. D/C Home  2. Discharge Medication List as of 8/4/2019  4:07 PM      START taking these medications    Details   potassium chloride SR (KLOR-CON 10) 10 mEq tablet Take 2 Tabs by mouth daily for 7 days. Indications: low amount of potassium in the blood, Print, Disp-14 Tab, R-0         CONTINUE these medications which have NOT CHANGED    Details   insulin syringe-needle U-100 (BD INSULIN SYRINGE SAFETYGLIDE) 1 mL 29 gauge x 1/2\" syrg 1 Syringe by Does Not Apply route Before breakfast, lunch, and dinner., Print, Disp-30 Syringe, R-0      lancets-blood glucose strips 30 gauge cmpk 1 Each by Does Not Apply route four (4) times daily. , Normal, Disp-1 Dose Pack, R-0      insulin lispro (HUMALOG U-100 INSULIN) 100 unit/mL injection Use as directed with insulin pump, Normal, Disp-1 Vial, R-0      Blood-Glucose Meter monitoring kit Use as directed., Normal, Disp-1 Kit, R-0      fluoxetine HCl (PROZAC PO) Take 20 mg by mouth daily. , Historical Med      RANITIDINE HCL PO Take  by mouth., Historical Med      promethazine HCl (PROMETHAZINE PO) Take  by mouth., Historical Med      AMPHET ASP/AMPHET/D-AMPHET (ADDERALL PO) Take  by mouth. Historical Med       insulin pump (PATIENT SUPPLIED) Misc This record is for patients using their home insulin pump patient, consider labs as appropriateby SubCUTAneous route as needed. Historical Med           3. Follow-up Information     Follow up With Specialties Details Why Contact Info    Angela Hutchinson, [address] [facility] Refugio Spencer MD Neurology   [address] [address] [phone]      [facility] EMERGENCY DEPT Emergency Medicine  As needed, If symptoms worsen [address] [id number] [phone]        _______________________________    Attestations:   This note is prepared by Dayron Zaragoza PA-C.  _______________________________        Please note that this dictation was completed with [employer], the computer voice recognition software. Quite often unanticipated grammatical, syntax, homophones, and other interpretive errors are inadvertently transcribed by the computer software. Please disregard these errors. Please excuse any errors that have escaped final proofreading.

## 2020-01-05 ENCOUNTER — HOSPITAL ENCOUNTER (EMERGENCY)
Age: 25
Discharge: HOME | End: 2020-01-05
Attending: EMERGENCY MEDICINE
Payer: COMMERCIAL

## 2020-01-05 VITALS
OXYGEN SATURATION: 100 % | HEIGHT: 71 IN | HEART RATE: 94 BPM | DIASTOLIC BLOOD PRESSURE: 97 MMHG | WEIGHT: 170 LBS | TEMPERATURE: 98.5 F | BODY MASS INDEX: 23.8 KG/M2 | SYSTOLIC BLOOD PRESSURE: 138 MMHG | RESPIRATION RATE: 20 BRPM
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
## 2020-01-05 DIAGNOSIS — S39.012A BACK STRAIN, INITIAL ENCOUNTER: ICD-10-CM

## 2020-01-05 DIAGNOSIS — S16.1XXA STRAIN OF NECK MUSCLE, INITIAL ENCOUNTER: ICD-10-CM

## 2020-01-05 PROCEDURE — 99282 EMERGENCY DEPT VISIT SF MDM: CPT

## 2020-01-05 RX ORDER — IBUPROFEN 600 MG/1
600 TABLET ORAL
Qty: 20 TAB | Refills: 0 | Status: SHIPPED | OUTPATIENT
Start: 2020-01-05 | End: 2020-04-08

## 2020-01-05 RX ORDER — METHOCARBAMOL 750 MG/1
750 TABLET, FILM COATED ORAL 4 TIMES DAILY
Qty: 12 TAB | Refills: 0 | Status: SHIPPED | OUTPATIENT
Start: 2020-01-05

## 2020-01-05 NOTE — LETTER
[facility] [place] 
[facility] EMERGENCY DEPT 
[address] [address] [phone] Work/School Note Date: 1/5/2020 To Whom It May concern: 
 
Farhat Macias was seen and treated today in the emergency room by the following provider(s): 
Attending Provider: Jeremy Dunn MD 
Physician Assistant: NONA Tony. Farhat Macias may return to work on 1/8/20 Sincerely, NONA Villa
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
## 2020-04-06 ENCOUNTER — HOSPITAL ENCOUNTER (INPATIENT)
Age: 25
LOS: 2 days | Discharge: HOME | DRG: 420 | End: 2020-04-08
Attending: EMERGENCY MEDICINE | Admitting: INTERNAL MEDICINE
Payer: COMMERCIAL

## 2020-04-06 ENCOUNTER — APPOINTMENT (OUTPATIENT)
Dept: GENERAL RADIOLOGY | Age: 25
DRG: 420 | End: 2020-04-06
Attending: EMERGENCY MEDICINE
Payer: COMMERCIAL

## 2020-04-06 DIAGNOSIS — E87.20 METABOLIC ACIDOSIS: ICD-10-CM

## 2020-04-06 DIAGNOSIS — D72.829 LEUKOCYTOSIS, UNSPECIFIED TYPE: ICD-10-CM

## 2020-04-06 DIAGNOSIS — N17.9 AKI (ACUTE KIDNEY INJURY) (HCC): ICD-10-CM

## 2020-04-06 DIAGNOSIS — E86.0 DEHYDRATION: ICD-10-CM

## 2020-04-06 DIAGNOSIS — R65.10 SIRS (SYSTEMIC INFLAMMATORY RESPONSE SYNDROME) (HCC): ICD-10-CM

## 2020-04-06 DIAGNOSIS — E10.10 TYPE 1 DIABETES MELLITUS WITH KETOACIDOSIS WITHOUT COMA (HCC): Primary | ICD-10-CM

## 2020-04-06 PROBLEM — F10.10 ALCOHOL ABUSE: Status: ACTIVE | Noted: 2020-04-06

## 2020-04-06 LAB
ADMINISTERED INITIALS, ADMINIT: NORMAL
ALBUMIN SERPL-MCNC: 3.5 G/DL (ref 3.4–5)
ALBUMIN SERPL-MCNC: 4.2 G/DL (ref 3.4–5)
ALBUMIN/GLOB SERPL: 1.1 {RATIO} (ref 0.8–1.7)
ALBUMIN/GLOB SERPL: 1.2 {RATIO} (ref 0.8–1.7)
ALP SERPL-CCNC: 117 U/L (ref 45–117)
ALP SERPL-CCNC: 141 U/L (ref 45–117)
ALT SERPL-CCNC: 22 U/L (ref 16–61)
ALT SERPL-CCNC: 23 U/L (ref 16–61)
ANION GAP SERPL CALC-SCNC: 10 MMOL/L (ref 3–18)
ANION GAP SERPL CALC-SCNC: 15 MMOL/L (ref 3–18)
ANION GAP SERPL CALC-SCNC: 29 MMOL/L (ref 3–18)
ANION GAP SERPL CALC-SCNC: 8 MMOL/L (ref 3–18)
ARTERIAL PATENCY WRIST A: ABNORMAL
AST SERPL-CCNC: 14 U/L (ref 10–38)
AST SERPL-CCNC: 17 U/L (ref 10–38)
BASE DEFICIT BLD-SCNC: 16 MMOL/L
BASOPHILS # BLD: 0 K/UL (ref 0–0.1)
BASOPHILS NFR BLD: 0 % (ref 0–3)
BDY SITE: ABNORMAL
BILIRUB SERPL-MCNC: 2.4 MG/DL (ref 0.2–1)
BILIRUB SERPL-MCNC: 3 MG/DL (ref 0.2–1)
BODY TEMPERATURE: 98.7
BUN SERPL-MCNC: 20 MG/DL (ref 7–18)
BUN SERPL-MCNC: 24 MG/DL (ref 7–18)
BUN SERPL-MCNC: 26 MG/DL (ref 7–18)
BUN SERPL-MCNC: 29 MG/DL (ref 7–18)
BUN/CREAT SERPL: 15 (ref 12–20)
BUN/CREAT SERPL: 17 (ref 12–20)
BUN/CREAT SERPL: 18 (ref 12–20)
BUN/CREAT SERPL: 21 (ref 12–20)
CALCIUM SERPL-MCNC: 10.9 MG/DL (ref 8.5–10.1)
CALCIUM SERPL-MCNC: 8.3 MG/DL (ref 8.5–10.1)
CALCIUM SERPL-MCNC: 8.8 MG/DL (ref 8.5–10.1)
CALCIUM SERPL-MCNC: 9.6 MG/DL (ref 8.5–10.1)
CHLORIDE SERPL-SCNC: 106 MMOL/L (ref 100–111)
CHLORIDE SERPL-SCNC: 111 MMOL/L (ref 100–111)
CHLORIDE SERPL-SCNC: 111 MMOL/L (ref 100–111)
CHLORIDE SERPL-SCNC: 91 MMOL/L (ref 100–111)
CO2 SERPL-SCNC: 12 MMOL/L (ref 21–32)
CO2 SERPL-SCNC: 18 MMOL/L (ref 21–32)
CO2 SERPL-SCNC: 21 MMOL/L (ref 21–32)
CO2 SERPL-SCNC: 23 MMOL/L (ref 21–32)
CREAT SERPL-MCNC: 1.12 MG/DL (ref 0.6–1.3)
CREAT SERPL-MCNC: 1.16 MG/DL (ref 0.6–1.3)
CREAT SERPL-MCNC: 1.54 MG/DL (ref 0.6–1.3)
CREAT SERPL-MCNC: 1.89 MG/DL (ref 0.6–1.3)
D50 ADMINISTERED, D50ADM: 0 ML
D50 ORDER, D50ORD: 0 ML
DIFFERENTIAL METHOD BLD: ABNORMAL
EOSINOPHIL # BLD: 0 K/UL (ref 0–0.4)
EOSINOPHIL NFR BLD: 0 % (ref 0–5)
ERYTHROCYTE [DISTWIDTH] IN BLOOD BY AUTOMATED COUNT: 13.3 % (ref 11.6–14.5)
EST. AVERAGE GLUCOSE BLD GHB EST-MCNC: 280 MG/DL
GAS FLOW.O2 O2 DELIVERY SYS: ABNORMAL L/MIN
GLOBULIN SER CALC-MCNC: 3.1 G/DL (ref 2–4)
GLOBULIN SER CALC-MCNC: 3.5 G/DL (ref 2–4)
GLUCOSE BLD STRIP.AUTO-MCNC: 114 MG/DL (ref 70–110)
GLUCOSE BLD STRIP.AUTO-MCNC: 119 MG/DL (ref 70–110)
GLUCOSE BLD STRIP.AUTO-MCNC: 147 MG/DL (ref 70–110)
GLUCOSE BLD STRIP.AUTO-MCNC: 180 MG/DL (ref 70–110)
GLUCOSE BLD STRIP.AUTO-MCNC: 192 MG/DL (ref 70–110)
GLUCOSE BLD STRIP.AUTO-MCNC: 238 MG/DL (ref 70–110)
GLUCOSE BLD STRIP.AUTO-MCNC: 249 MG/DL (ref 70–110)
GLUCOSE BLD STRIP.AUTO-MCNC: 250 MG/DL (ref 70–110)
GLUCOSE BLD STRIP.AUTO-MCNC: 263 MG/DL (ref 70–110)
GLUCOSE BLD STRIP.AUTO-MCNC: 263 MG/DL (ref 70–110)
GLUCOSE BLD STRIP.AUTO-MCNC: 310 MG/DL (ref 70–110)
GLUCOSE BLD STRIP.AUTO-MCNC: 362 MG/DL (ref 70–110)
GLUCOSE BLD STRIP.AUTO-MCNC: 450 MG/DL (ref 70–110)
GLUCOSE BLD STRIP.AUTO-MCNC: >600 MG/DL (ref 70–110)
GLUCOSE BLD STRIP.AUTO-MCNC: >600 MG/DL (ref 70–110)
GLUCOSE SERPL-MCNC: 144 MG/DL (ref 74–99)
GLUCOSE SERPL-MCNC: 258 MG/DL (ref 74–99)
GLUCOSE SERPL-MCNC: 356 MG/DL (ref 74–99)
GLUCOSE SERPL-MCNC: 784 MG/DL (ref 74–99)
GLUCOSE, GLC: 114 MG/DL
GLUCOSE, GLC: 119 MG/DL
GLUCOSE, GLC: 147 MG/DL
GLUCOSE, GLC: 180 MG/DL
GLUCOSE, GLC: 192 MG/DL
GLUCOSE, GLC: 238 MG/DL
GLUCOSE, GLC: 249 MG/DL
GLUCOSE, GLC: 250 MG/DL
GLUCOSE, GLC: 263 MG/DL
GLUCOSE, GLC: 263 MG/DL
GLUCOSE, GLC: 310 MG/DL
GLUCOSE, GLC: 362 MG/DL
GLUCOSE, GLC: 450 MG/DL
GLUCOSE, GLC: 600 MG/DL
HBA1C MFR BLD: 11.4 % (ref 4.2–5.6)
HCO3 BLD-SCNC: 11.1 MMOL/L (ref 22–26)
HCT VFR BLD AUTO: 46.6 % (ref 36–48)
HGB BLD-MCNC: 16.3 G/DL (ref 13–16)
HIGH TARGET, HITG: 180 MG/DL
INSULIN ADMINSTERED, INSADM: 0 UNITS/HOUR
INSULIN ADMINSTERED, INSADM: 0 UNITS/HOUR
INSULIN ADMINSTERED, INSADM: 0.6 UNITS/HOUR
INSULIN ADMINSTERED, INSADM: 1.7 UNITS/HOUR
INSULIN ADMINSTERED, INSADM: 10.8 UNITS/HOUR
INSULIN ADMINSTERED, INSADM: 2 UNITS/HOUR
INSULIN ADMINSTERED, INSADM: 2 UNITS/HOUR
INSULIN ADMINSTERED, INSADM: 3 UNITS/HOUR
INSULIN ADMINSTERED, INSADM: 3.8 UNITS/HOUR
INSULIN ADMINSTERED, INSADM: 3.9 UNITS/HOUR
INSULIN ADMINSTERED, INSADM: 5 UNITS/HOUR
INSULIN ADMINSTERED, INSADM: 5.3 UNITS/HOUR
INSULIN ADMINSTERED, INSADM: 5.3 UNITS/HOUR
INSULIN ADMINSTERED, INSADM: 5.7 UNITS/HOUR
INSULIN ORDER, INSORD: 0 UNITS/HOUR
INSULIN ORDER, INSORD: 0 UNITS/HOUR
INSULIN ORDER, INSORD: 0.6 UNITS/HOUR
INSULIN ORDER, INSORD: 1.7 UNITS/HOUR
INSULIN ORDER, INSORD: 10.8 UNITS/HOUR
INSULIN ORDER, INSORD: 2 UNITS/HOUR
INSULIN ORDER, INSORD: 2 UNITS/HOUR
INSULIN ORDER, INSORD: 3 UNITS/HOUR
INSULIN ORDER, INSORD: 3.8 UNITS/HOUR
INSULIN ORDER, INSORD: 3.9 UNITS/HOUR
INSULIN ORDER, INSORD: 5 UNITS/HOUR
INSULIN ORDER, INSORD: 5.3 UNITS/HOUR
INSULIN ORDER, INSORD: 5.3 UNITS/HOUR
INSULIN ORDER, INSORD: 5.7 UNITS/HOUR
LACTATE SERPL-SCNC: 5 MMOL/L (ref 0.4–2)
LIPASE SERPL-CCNC: 11 U/L (ref 73–393)
LOW TARGET, LOT: 140 MG/DL
LYMPHOCYTES # BLD: 1 K/UL (ref 0.8–3.5)
LYMPHOCYTES NFR BLD: 4 % (ref 20–51)
MAGNESIUM SERPL-MCNC: 2.2 MG/DL (ref 1.6–2.6)
MAGNESIUM SERPL-MCNC: 2.4 MG/DL (ref 1.6–2.6)
MAGNESIUM SERPL-MCNC: 2.6 MG/DL (ref 1.6–2.6)
MAGNESIUM SERPL-MCNC: 2.7 MG/DL (ref 1.6–2.6)
MCH RBC QN AUTO: 29.8 PG (ref 24–34)
MCHC RBC AUTO-ENTMCNC: 35 G/DL (ref 31–37)
MCV RBC AUTO: 85.2 FL (ref 74–97)
MINUTES UNTIL NEXT BG, NBG: 60 MIN
MONOCYTES # BLD: 0.7 K/UL (ref 0–1)
MONOCYTES NFR BLD: 3 % (ref 2–9)
MULTIPLIER, MUL: 0
MULTIPLIER, MUL: 0
MULTIPLIER, MUL: 0.01
MULTIPLIER, MUL: 0.02
MULTIPLIER, MUL: 0.03
MULTIPLIER, MUL: 0.03
MULTIPLIER, MUL: 0.04
NEUTS BAND NFR BLD MANUAL: 3 % (ref 0–5)
NEUTS SEG # BLD: 21.4 K/UL (ref 1.8–8)
NEUTS SEG NFR BLD: 90 % (ref 42–75)
O2/TOTAL GAS SETTING VFR VENT: 0.21 %
ORDER INITIALS, ORDINIT: NORMAL
PCO2 BLD: 23.2 MMHG (ref 35–45)
PH BLD: 7.29 [PH] (ref 7.35–7.45)
PHOSPHATE SERPL-MCNC: 2.7 MG/DL (ref 2.5–4.9)
PLATELET # BLD AUTO: 280 K/UL (ref 135–420)
PLATELET COMMENTS,PCOM: ABNORMAL
PMV BLD AUTO: 11.2 FL (ref 9.2–11.8)
PO2 BLD: 117 MMHG (ref 80–100)
POTASSIUM SERPL-SCNC: 3.8 MMOL/L (ref 3.5–5.5)
POTASSIUM SERPL-SCNC: 4 MMOL/L (ref 3.5–5.5)
POTASSIUM SERPL-SCNC: 4.3 MMOL/L (ref 3.5–5.5)
POTASSIUM SERPL-SCNC: 4.7 MMOL/L (ref 3.5–5.5)
PROT SERPL-MCNC: 6.6 G/DL (ref 6.4–8.2)
PROT SERPL-MCNC: 7.7 G/DL (ref 6.4–8.2)
RBC # BLD AUTO: 5.47 M/UL (ref 4.7–5.5)
RBC MORPH BLD: ABNORMAL
SAO2 % BLD: 98 % (ref 92–97)
SERVICE CMNT-IMP: ABNORMAL
SODIUM SERPL-SCNC: 132 MMOL/L (ref 136–145)
SODIUM SERPL-SCNC: 139 MMOL/L (ref 136–145)
SODIUM SERPL-SCNC: 142 MMOL/L (ref 136–145)
SODIUM SERPL-SCNC: 142 MMOL/L (ref 136–145)
SPECIMEN TYPE: ABNORMAL
TOTAL RESP. RATE, ITRR: 23
WBC # BLD AUTO: 23.8 K/UL (ref 4.6–13.2)

## 2020-04-06 PROCEDURE — 36600 WITHDRAWAL OF ARTERIAL BLOOD: CPT

## 2020-04-06 PROCEDURE — 82803 BLOOD GASES ANY COMBINATION: CPT

## 2020-04-06 PROCEDURE — 74011250637 HC RX REV CODE- 250/637: Performed by: INTERNAL MEDICINE

## 2020-04-06 PROCEDURE — 96375 TX/PRO/DX INJ NEW DRUG ADDON: CPT

## 2020-04-06 PROCEDURE — 74011000250 HC RX REV CODE- 250: Performed by: INTERNAL MEDICINE

## 2020-04-06 PROCEDURE — 74011000258 HC RX REV CODE- 258: Performed by: EMERGENCY MEDICINE

## 2020-04-06 PROCEDURE — 84100 ASSAY OF PHOSPHORUS: CPT

## 2020-04-06 PROCEDURE — 85025 COMPLETE CBC W/AUTO DIFF WBC: CPT

## 2020-04-06 PROCEDURE — 65610000006 HC RM INTENSIVE CARE

## 2020-04-06 PROCEDURE — 82962 GLUCOSE BLOOD TEST: CPT

## 2020-04-06 PROCEDURE — 83735 ASSAY OF MAGNESIUM: CPT

## 2020-04-06 PROCEDURE — 74011000258 HC RX REV CODE- 258: Performed by: INTERNAL MEDICINE

## 2020-04-06 PROCEDURE — 99285 EMERGENCY DEPT VISIT HI MDM: CPT

## 2020-04-06 PROCEDURE — 80053 COMPREHEN METABOLIC PANEL: CPT

## 2020-04-06 PROCEDURE — 83036 HEMOGLOBIN GLYCOSYLATED A1C: CPT

## 2020-04-06 PROCEDURE — 96374 THER/PROPH/DIAG INJ IV PUSH: CPT

## 2020-04-06 PROCEDURE — 74011250636 HC RX REV CODE- 250/636: Performed by: INTERNAL MEDICINE

## 2020-04-06 PROCEDURE — 83690 ASSAY OF LIPASE: CPT

## 2020-04-06 PROCEDURE — 74011636637 HC RX REV CODE- 636/637: Performed by: EMERGENCY MEDICINE

## 2020-04-06 PROCEDURE — C9113 INJ PANTOPRAZOLE SODIUM, VIA: HCPCS | Performed by: INTERNAL MEDICINE

## 2020-04-06 PROCEDURE — 74011250636 HC RX REV CODE- 250/636: Performed by: EMERGENCY MEDICINE

## 2020-04-06 PROCEDURE — 71045 X-RAY EXAM CHEST 1 VIEW: CPT

## 2020-04-06 PROCEDURE — 83605 ASSAY OF LACTIC ACID: CPT

## 2020-04-06 PROCEDURE — 36415 COLL VENOUS BLD VENIPUNCTURE: CPT

## 2020-04-06 RX ORDER — OXYCODONE AND ACETAMINOPHEN 5; 325 MG/1; MG/1
1 TABLET ORAL
Status: DISCONTINUED | OUTPATIENT
Start: 2020-04-06 | End: 2020-04-08 | Stop reason: HOSPADM

## 2020-04-06 RX ORDER — HEPARIN SODIUM 5000 [USP'U]/ML
5000 INJECTION, SOLUTION INTRAVENOUS; SUBCUTANEOUS EVERY 8 HOURS
Status: DISCONTINUED | OUTPATIENT
Start: 2020-04-06 | End: 2020-04-08 | Stop reason: HOSPADM

## 2020-04-06 RX ORDER — ONDANSETRON 4 MG/1
4 TABLET, FILM COATED ORAL
COMMUNITY

## 2020-04-06 RX ORDER — SODIUM CHLORIDE 9 MG/ML
150 INJECTION, SOLUTION INTRAVENOUS CONTINUOUS
Status: DISCONTINUED | OUTPATIENT
Start: 2020-04-06 | End: 2020-04-06

## 2020-04-06 RX ORDER — ONDANSETRON 2 MG/ML
4 INJECTION INTRAMUSCULAR; INTRAVENOUS
Status: DISCONTINUED | OUTPATIENT
Start: 2020-04-06 | End: 2020-04-06 | Stop reason: ALTCHOICE

## 2020-04-06 RX ORDER — HEPARIN SODIUM 5000 [USP'U]/ML
5000 INJECTION, SOLUTION INTRAVENOUS; SUBCUTANEOUS EVERY 8 HOURS
Status: DISCONTINUED | OUTPATIENT
Start: 2020-04-06 | End: 2020-04-06 | Stop reason: SDUPTHER

## 2020-04-06 RX ORDER — LORAZEPAM 1 MG/1
1 TABLET ORAL
Status: DISCONTINUED | OUTPATIENT
Start: 2020-04-06 | End: 2020-04-08 | Stop reason: HOSPADM

## 2020-04-06 RX ORDER — ADHESIVE BANDAGE
30 BANDAGE TOPICAL DAILY PRN
Status: DISCONTINUED | OUTPATIENT
Start: 2020-04-06 | End: 2020-04-08 | Stop reason: HOSPADM

## 2020-04-06 RX ORDER — LORAZEPAM 2 MG/ML
1 INJECTION INTRAMUSCULAR
Status: DISCONTINUED | OUTPATIENT
Start: 2020-04-06 | End: 2020-04-08 | Stop reason: HOSPADM

## 2020-04-06 RX ORDER — SODIUM CHLORIDE 0.9 % (FLUSH) 0.9 %
5-40 SYRINGE (ML) INJECTION EVERY 8 HOURS
Status: DISCONTINUED | OUTPATIENT
Start: 2020-04-06 | End: 2020-04-08 | Stop reason: HOSPADM

## 2020-04-06 RX ORDER — SODIUM CHLORIDE 0.9 % (FLUSH) 0.9 %
5-40 SYRINGE (ML) INJECTION AS NEEDED
Status: DISCONTINUED | OUTPATIENT
Start: 2020-04-06 | End: 2020-04-08 | Stop reason: HOSPADM

## 2020-04-06 RX ORDER — LORAZEPAM 1 MG/1
2 TABLET ORAL
Status: DISCONTINUED | OUTPATIENT
Start: 2020-04-06 | End: 2020-04-08 | Stop reason: HOSPADM

## 2020-04-06 RX ORDER — DEXTROSE MONOHYDRATE AND SODIUM CHLORIDE 5; .9 G/100ML; G/100ML
150 INJECTION, SOLUTION INTRAVENOUS CONTINUOUS
Status: DISCONTINUED | OUTPATIENT
Start: 2020-04-06 | End: 2020-04-07 | Stop reason: ALTCHOICE

## 2020-04-06 RX ORDER — PANTOPRAZOLE SODIUM 40 MG/10ML
40 INJECTION, POWDER, LYOPHILIZED, FOR SOLUTION INTRAVENOUS DAILY
Status: DISCONTINUED | OUTPATIENT
Start: 2020-04-06 | End: 2020-04-08 | Stop reason: HOSPADM

## 2020-04-06 RX ORDER — METOCLOPRAMIDE HYDROCHLORIDE 5 MG/ML
10 INJECTION INTRAMUSCULAR; INTRAVENOUS
Status: COMPLETED | OUTPATIENT
Start: 2020-04-06 | End: 2020-04-06

## 2020-04-06 RX ORDER — ONDANSETRON 2 MG/ML
4 INJECTION INTRAMUSCULAR; INTRAVENOUS
Status: DISCONTINUED | OUTPATIENT
Start: 2020-04-06 | End: 2020-04-08 | Stop reason: HOSPADM

## 2020-04-06 RX ORDER — DIPHENHYDRAMINE HYDROCHLORIDE 50 MG/ML
12.5 INJECTION, SOLUTION INTRAMUSCULAR; INTRAVENOUS
Status: DISCONTINUED | OUTPATIENT
Start: 2020-04-06 | End: 2020-04-08 | Stop reason: HOSPADM

## 2020-04-06 RX ORDER — LORAZEPAM 2 MG/ML
2 INJECTION INTRAMUSCULAR
Status: DISCONTINUED | OUTPATIENT
Start: 2020-04-06 | End: 2020-04-08 | Stop reason: HOSPADM

## 2020-04-06 RX ORDER — SODIUM CHLORIDE 9 MG/ML
125 INJECTION, SOLUTION INTRAVENOUS CONTINUOUS
Status: DISCONTINUED | OUTPATIENT
Start: 2020-04-06 | End: 2020-04-06

## 2020-04-06 RX ORDER — MAGNESIUM SULFATE 100 %
4 CRYSTALS MISCELLANEOUS AS NEEDED
Status: DISCONTINUED | OUTPATIENT
Start: 2020-04-06 | End: 2020-04-07

## 2020-04-06 RX ORDER — LORAZEPAM 2 MG/ML
3 INJECTION INTRAMUSCULAR
Status: DISCONTINUED | OUTPATIENT
Start: 2020-04-06 | End: 2020-04-08 | Stop reason: HOSPADM

## 2020-04-06 RX ORDER — DEXTROSE MONOHYDRATE 100 MG/ML
125-250 INJECTION, SOLUTION INTRAVENOUS AS NEEDED
Status: DISCONTINUED | OUTPATIENT
Start: 2020-04-06 | End: 2020-04-07

## 2020-04-06 RX ADMIN — SODIUM CHLORIDE 10.8 UNITS/HR: 9 INJECTION, SOLUTION INTRAVENOUS at 07:19

## 2020-04-06 RX ADMIN — SODIUM CHLORIDE 1.7 UNITS/HR: 9 INJECTION, SOLUTION INTRAVENOUS at 15:30

## 2020-04-06 RX ADMIN — SODIUM CHLORIDE 150 ML/HR: 900 INJECTION, SOLUTION INTRAVENOUS at 10:10

## 2020-04-06 RX ADMIN — HEPARIN SODIUM 5000 UNITS: 5000 INJECTION INTRAVENOUS; SUBCUTANEOUS at 19:00

## 2020-04-06 RX ADMIN — HUMAN INSULIN 10 UNITS: 100 INJECTION, SOLUTION SUBCUTANEOUS at 06:19

## 2020-04-06 RX ADMIN — PANTOPRAZOLE SODIUM 40 MG: 40 INJECTION, POWDER, FOR SOLUTION INTRAVENOUS at 11:23

## 2020-04-06 RX ADMIN — Medication 10 ML: at 19:53

## 2020-04-06 RX ADMIN — FOLIC ACID: 5 INJECTION, SOLUTION INTRAMUSCULAR; INTRAVENOUS; SUBCUTANEOUS at 10:10

## 2020-04-06 RX ADMIN — METOCLOPRAMIDE 10 MG: 5 INJECTION, SOLUTION INTRAMUSCULAR; INTRAVENOUS at 06:32

## 2020-04-06 RX ADMIN — SODIUM CHLORIDE 1000 ML: 900 INJECTION, SOLUTION INTRAVENOUS at 06:19

## 2020-04-06 RX ADMIN — ONDANSETRON 4 MG: 2 INJECTION INTRAMUSCULAR; INTRAVENOUS at 18:59

## 2020-04-06 RX ADMIN — OXYCODONE HYDROCHLORIDE AND ACETAMINOPHEN 1 TABLET: 5; 325 TABLET ORAL at 19:51

## 2020-04-06 RX ADMIN — HEPARIN SODIUM 5000 UNITS: 5000 INJECTION INTRAVENOUS; SUBCUTANEOUS at 11:23

## 2020-04-06 RX ADMIN — SODIUM CHLORIDE 1000 ML: 900 INJECTION, SOLUTION INTRAVENOUS at 06:35

## 2020-04-06 RX ADMIN — DEXTROSE MONOHYDRATE AND SODIUM CHLORIDE 150 ML/HR: 5; .9 INJECTION, SOLUTION INTRAVENOUS at 15:50

## 2020-04-06 NOTE — CONSULTS
[facility]  Pulmonary, Critical Care, and Sleep Medicine    Name: Puma Schmid MRN: [id number]   : 1995 Hospital: [facility] [place]    Date: 2020  Room: [facility]/[address] [place] [address] Note                                              Consult requesting physician: Dr. Stu Fields  Reason for Consult: DKA      IMPRESSION:   DKA (diabetic ketoacidoses) ([facility] E11.10     Alcohol abuse F10.10 ·     ·   Patient Active Problem List   Diagnosis Code    DKA (diabetic ketoacidoses) ([facility] E11.10    Renal insufficiency N28.9    Type I diabetes mellitus ([facility] E10.9    DON (acute kidney injury) ([facility] N17.9    Dehydration E86.0    DKA, type 1 ([facility] E50.56    Metabolic acidosis F77.5    Leukocytosis D72.829    SIRS (systemic inflammatory response syndrome) [facility] R65.10    Alcohol abuse F10.10 ·     · Code status: full      RECOMMENDATIONS:   Respiratory:   No acute issues. Protecting airway. and then re-titrate if required. Chlorhexidine mouth washes. Keep SPO2 >=92%. HOB 30 degree elevation all the time. Aggressive pulmonary toileting. Aspiration precautions. Incentive spirometry. CVS: BP stable, monitor hemodynamics   ID: leucocytosis, but no fever. No source of infection. Monitor off abx. Hematology/Oncology: no acute issuees  Renal: mild DON, c/w IVF and monitor UOP and creat. GI/: nausea and vomiting resolved. Was likely due to DKA  Endocrine: Monitor BS. C/w insulin drip protocol, anion gap. Neurology: no acute issues  Toxicology: alcohol abuse, drinks 8-12 beer 2-3 times/week. . Alcohol level <3 on admission. Tachycardia likely due to DKA. Monitor for alcohol withdrawal. C/w banana bag, CIWA scoring. Pain/Sedation: no acute issues  Skin/Wound: no issues  Electrolytes: Replace electrolytes per ICU electrolyte replacement protocol. IVF: per insulin drip protocol  Nutrition: npo while on insulin drip protocol  Prophylaxis: DVT Prophylaxis: SCD/heparin.  GI Prophylaxis: Protonix/protonix. Restraints: none  Lines/Tubes: PIV    Will defer respective systems problem management to primary and other respective consultant and follow patient in ICU with primary and other medical team.  Further recommendations will be based on the patient's response to recommended treatment and results of the investigation ordered. Quality Care: PPI, DVT prophylaxis, HOB elevated, Infection control all reviewed and addressed. Care of plan d/w RN, RT, MDR, Dr. Stu Fields. D/w patient (answered all questions to satisfaction). High complexity decision making was performed during the evaluation of this patient at high risk for decompensation with multiple organ involvement. Total critical care time spent rendering care exclusive of procedures: 32 minutes. Subjective/History of Present Illness:     Patient is a 21 y.o. male with PMHx significant for IDDM, alcohol abuse admitted with DKA, DON, leucocytosis and lactic acidosis. Pt was drinking alcohol on 4/4/20 night. On 4/5/20 he developed nausea and vomiting with hyperglycemia. He self treated with phenergan and zofran and insulin. He thinks that his insulin pump was dislodged on prior night. Came to ER on 4/6/20 and found to have DKA with high AG, admitted on insulin drip. Since then feeling better. No more N V.     4/6/2020:  Admitted to icu on insulin drip. No N V abd pain fever chills cough cp dyspnea wheezing. No direct contact to COVID 19 patients or recent travel. I/O last 24 hrs:      Intake/Output Summary (Last 24 hours) at 4/6/2020 1138  Last data filed at 4/6/2020 0830  Gross per 24 hour   Intake 2012.78 ml   Output    Net 2012.78 ml       History taken from patient, EMR     Review of Systems:   HEENT: No epistaxis, no nasal drainage, no difficulty in swallowing, no redness in eyes  Respiratory: as above  Cardiovascular: no chest pain, no palpitations, no chronic leg edema, no syncope  Gastrointestinal: no abd pain, no bleeding symptoms  Genitourinary: No urinary symptoms or hematuria  Musculoskeletal: Neg  Neurological: No focal weakness, no seizures, no headaches  Behvioral/Psych: No anxiety, no depression  Constitutional: No fever, no chills, no weight loss, no night sweats     No Known Allergies   Past Medical History:   Diagnosis Date    ADD (attention deficit disorder)     Diabetes ([facility]     Insomnia     Seizure ([facility]       History reviewed. No pertinent surgical history. Social History     Tobacco Use    Smoking status: Never Smoker    Smokeless tobacco: Former User   Substance Use Topics    Alcohol use: Yes     Alcohol/week: 16.0 standard drinks     Types: 10 Cans of beer, 6 Shots of liquor per week      History reviewed. No pertinent family history. Prior to Admission medications    Medication Sig Start Date End Date Taking? Authorizing Provider   ondansetron hcl (Zofran) 4 mg tablet Take 4 mg by mouth every eight (8) hours as needed for Nausea or Nausea or Vomiting. Yes Provider, Historical   insulin syringe-needle U-100 (BD INSULIN SYRINGE SAFETYGLIDE) 1 mL 29 gauge x 1/2\" syrg 1 Syringe by Does Not Apply route Before breakfast, lunch, and dinner. 4/4/19  Yes Newhall, Maximino Boast, PA   lancets-blood glucose strips 30 gauge cmpk 1 Each by Does Not Apply route four (4) times daily. 4/3/19  Yes Delano Lomas PA-C   insulin lispro (HUMALOG U-100 INSULIN) 100 unit/mL injection Use as directed with insulin pump 4/3/19  Yes Delano Lomas PA-C   fluoxetine HCl (PROZAC PO) Take 20 mg by mouth daily. Yes Shahzad, MD Brittni   AMPHET ASP/AMPHET/D-AMPHET (ADDERALL PO) Take 30 mg by mouth two (2) times a day. Yes Brittni Pike MD    insulin pump (PATIENT SUPPLIED) Misc by SubCUTAneous route as needed. Yes Brittin Pike MD   ibuprofen (MOTRIN) 600 mg tablet Take 1 Tab by mouth every six (6) hours as needed for Pain.  1/5/20   NONA Juarez   methocarbamol (ROBAXIN-750) 750 mg tablet Take 1 Tab by mouth four (4) times daily. 20   Halina Donnelly PA   Blood-Glucose Meter monitoring kit Use as directed. 4/3/19   Karuna Hodge PA-C   RANITIDINE HCL PO Take  by mouth. Brittni Pike MD   promethazine HCl (PROMETHAZINE PO) Take  by mouth. Brittni Pike MD     Current Facility-Administered Medications   Medication Dose Route Frequency    insulin regular (NOVOLIN R, HUMULIN R) 100 Units in 0.9% sodium chloride 100 mL infusion  0-50 Units/hr IntraVENous TITRATE    sodium chloride (NS) flush 5-40 mL  5-40 mL IntraVENous Q8H    0.9% sodium chloride infusion  150 mL/hr IntraVENous CONTINUOUS    sodium bicarbonate (8.4%) 50 mEq in 0.45% sodium chloride 1,000 mL infusion   IntraVENous CONTINUOUS    pantoprazole (PROTONIX) injection 40 mg  40 mg IntraVENous DAILY    heparin (porcine) injection 5,000 Units  5,000 Units SubCUTAneous Q8H    0.9% sodium chloride 1,000 mL with mvi, adult no. 4 with vit K 10 mL, thiamine 871 mg, folic acid 1 mg infusion   IntraVENous DAILY    sodium chloride (NS) flush 5-40 mL  5-40 mL IntraVENous Q8H    influenza vaccine - (6 mos+)(PF) (FLUARIX/FLULAVAL/FLUZONE QUAD) injection 0.5 mL  0.5 mL IntraMUSCular PRIOR TO DISCHARGE         Objective:   Vital Signs:    Visit Vitals  /71   Pulse (!) 127 Comment: hospitalist aware of hr   Temp 97.9 °F (36.6 °C)   Resp 16   Ht 5' 11\" (1.803 m)   Wt 77.1 kg (170 lb)   SpO2 100%   BMI 23.71 kg/m²       O2 Device: Room air       Temp (24hrs), Av °F (36.7 °C), Min:97.8 °F (36.6 °C), Max:98.4 °F (36.9 °C)       Intake/Output:   Last shift:       07 -  1900  In: .8 [I.V.:.8]  Out: -     Last 3 shifts: No intake/output data recorded.       Intake/Output Summary (Last 24 hours) at 2020 1138  Last data filed at 2020 0830  Gross per 24 hour   Intake 2012.78 ml   Output    Net 2012.78 ml       Last 3 Recorded Weights in this Encounter    20 0560 Weight: 77.1 kg (170 lb)             Recent Labs     04/06/20  0643   PHI 7.287*   PCO2I 23.2*   PO2I 117*   HCO3I 11.1*   FIO2I 0.21       Physical Exam:     General/Neurology: Alert, Awake, NAD. Head:   Normocephalic, without obvious abnormality, atraumatic. Eye:   EOM intact, no scleral icterus, no pallor, no cyanosis. Nose:   No sinus tenderness  Throat:  Lips, mucosa, and tongue normal. No oral thrush. Neck:   Supple, symmetric. No lymphadenopathy. Trachea midline  Lung: Moderate air entry bilateral equal. No rales. No rhonchi. No wheezing. No stridors. No prolongded expiration. No accessory muscle use. Heart:   Regular rate & rhythm. S1 S2 present. No murmur. No JVD. Abdomen:  Soft. NT. ND. +BS. No masses. Extremities:  No pedal edema. No cyanosis. No clubbing. Pulses: 2+ and symmetric in DP. Capillary refill: normal  Lymphatic:  No cervical or supraclavicular palpable lymphadenopathy. Musculoskeletal: No joint swelling. No tenderness. Skin:   Color, texture, turgor normal. No rashes or lesions. Data:       Recent Results (from the past 24 hour(s))   CBC WITH AUTOMATED DIFF    Collection Time: 04/06/20  6:00 AM   Result Value Ref Range    WBC 23.8 (H) 4.6 - 13.2 K/uL    RBC 5.47 4.70 - 5.50 M/uL    HGB 16.3 (H) 13.0 - 16.0 g/dL    HCT 46.6 36.0 - 48.0 %    MCV 85.2 74.0 - 97.0 FL    MCH 29.8 24.0 - 34.0 PG    MCHC 35.0 31.0 - 37.0 g/dL    RDW 13.3 11.6 - 14.5 %    PLATELET 334 976 - 201 K/uL    MPV 11.2 9.2 - 11.8 FL    NEUTROPHILS 90 (H) 42 - 75 %    BAND NEUTROPHILS 3 0 - 5 %    LYMPHOCYTES 4 (L) 20 - 51 %    MONOCYTES 3 2 - 9 %    EOSINOPHILS 0 0 - 5 %    BASOPHILS 0 0 - 3 %    ABS. NEUTROPHILS 21.4 (H) 1.8 - 8.0 K/UL    ABS. LYMPHOCYTES 1.0 0.8 - 3.5 K/UL    ABS. MONOCYTES 0.7 0 - 1.0 K/UL    ABS. EOSINOPHILS 0.0 0.0 - 0.4 K/UL    ABS.  BASOPHILS 0.0 0.0 - 0.1 K/UL    PLATELET COMMENTS LARGE PLATELETS      RBC COMMENTS NORMOCYTIC, NORMOCHROMIC      DF MANUAL     METABOLIC PANEL, COMPREHENSIVE    Collection Time: 04/06/20  6:00 AM   Result Value Ref Range    Sodium 132 (L) 136 - 145 mmol/L    Potassium 4.7 3.5 - 5.5 mmol/L    Chloride 91 (L) 100 - 111 mmol/L    CO2 12 (L) 21 - 32 mmol/L    Anion gap 29 (H) 3.0 - 18 mmol/L    Glucose 784 (HH) 74 - 99 mg/dL    BUN 29 (H) 7.0 - 18 MG/DL    Creatinine 1.89 (H) 0.6 - 1.3 MG/DL    BUN/Creatinine ratio 15 12 - 20      GFR est AA 53 (L) >60 ml/min/1.73m2    GFR est non-AA 44 (L) >60 ml/min/1.73m2    Calcium 10.9 (H) 8.5 - 10.1 MG/DL    Bilirubin, total 3.0 (H) 0.2 - 1.0 MG/DL    ALT (SGPT) 22 16 - 61 U/L    AST (SGOT) 17 10 - 38 U/L    Alk. phosphatase 141 (H) 45 - 117 U/L    Protein, total 7.7 6.4 - 8.2 g/dL    Albumin 4.2 3.4 - 5.0 g/dL    Globulin 3.5 2.0 - 4.0 g/dL    A-G Ratio 1.2 0.8 - 1.7     LIPASE    Collection Time: 04/06/20  6:00 AM   Result Value Ref Range    Lipase 11 (L) 73 - 393 U/L   MAGNESIUM    Collection Time: 04/06/20  6:00 AM   Result Value Ref Range    Magnesium 2.7 (H) 1.6 - 2.6 mg/dL   HEMOGLOBIN A1C WITH EAG    Collection Time: 04/06/20  6:00 AM   Result Value Ref Range    Hemoglobin A1c 11.4 (H) 4.2 - 5.6 %    Est. average glucose 280 mg/dL   LACTIC ACID    Collection Time: 04/06/20  6:00 AM   Result Value Ref Range    Lactic acid 5.0 (HH) 0.4 - 2.0 MMOL/L   GLUCOSE, POC    Collection Time: 04/06/20  6:01 AM   Result Value Ref Range    Glucose (POC) >600 (HH) 70 - 110 mg/dL   POC G3    Collection Time: 04/06/20  6:43 AM   Result Value Ref Range    Device: ROOM AIR      FIO2 (POC) 0.21 %    pH (POC) 7.287 (L) 7.35 - 7.45      pCO2 (POC) 23.2 (L) 35.0 - 45.0 MMHG    pO2 (POC) 117 (H) 80 - 100 MMHG    HCO3 (POC) 11.1 (L) 22 - 26 MMOL/L    sO2 (POC) 98 (H) 92 - 97 %    Base deficit (POC) 16 mmol/L    Allens test (POC) N/A      Total resp. rate 23      Site RIGHT BRACHIAL      Patient temp.  98.7      Specimen type (POC) ARTERIAL      Performed by Demetris SMITH, POC    Collection Time: 04/06/20  6:57 AM   Result Value Ref Range    Glucose (POC) >600 (HH) 70 - 110 mg/dL   GLUCOSTABILIZER    Collection Time: 04/06/20  7:05 AM   Result Value Ref Range    Glucose 600 mg/dL    Insulin order 10.8 units/hour    Insulin adminstered 10.8 units/hour    Multiplier 0.020     Low target 140 mg/dL    High target 180 mg/dL    D50 order 0.0 ml    D50 administered 0.00 ml    Minutes until next BG 60 min    Order initials JMA     Administered initials JMA    GLUCOSE, POC    Collection Time: 04/06/20  8:54 AM   Result Value Ref Range    Glucose (POC) 450 (HH) 70 - 110 mg/dL   GLUCOSTABILIZER    Collection Time: 04/06/20  8:55 AM   Result Value Ref Range    Glucose 450 mg/dL    Insulin order 3.9 units/hour    Insulin adminstered 3.9 units/hour    Multiplier 0.010     Low target 140 mg/dL    High target 180 mg/dL    D50 order 0.0 ml    D50 administered 0.00 ml    Minutes until next BG 60 min    Order initials ab     Administered initials ab    MAGNESIUM    Collection Time: 04/06/20  9:56 AM   Result Value Ref Range    Magnesium 2.6 1.6 - 2.6 mg/dL   PHOSPHORUS    Collection Time: 04/06/20  9:56 AM   Result Value Ref Range    Phosphorus 2.7 2.5 - 4.9 MG/DL   METABOLIC PANEL, COMPREHENSIVE    Collection Time: 04/06/20  9:56 AM   Result Value Ref Range    Sodium 139 136 - 145 mmol/L    Potassium 4.3 3.5 - 5.5 mmol/L    Chloride 106 100 - 111 mmol/L    CO2 18 (L) 21 - 32 mmol/L    Anion gap 15 3.0 - 18 mmol/L    Glucose 356 (H) 74 - 99 mg/dL    BUN 26 (H) 7.0 - 18 MG/DL    Creatinine 1.54 (H) 0.6 - 1.3 MG/DL    BUN/Creatinine ratio 17 12 - 20      GFR est AA >60 >60 ml/min/1.73m2    GFR est non-AA 55 (L) >60 ml/min/1.73m2    Calcium 9.6 8.5 - 10.1 MG/DL    Bilirubin, total 2.4 (H) 0.2 - 1.0 MG/DL    ALT (SGPT) 23 16 - 61 U/L    AST (SGOT) 14 10 - 38 U/L    Alk.  phosphatase 117 45 - 117 U/L    Protein, total 6.6 6.4 - 8.2 g/dL    Albumin 3.5 3.4 - 5.0 g/dL    Globulin 3.1 2.0 - 4.0 g/dL    A-G Ratio 1.1 0.8 - 1.7     GLUCOSE, POC    Collection Time: 04/06/20 10:02 AM   Result Value Ref Range    Glucose (POC) 362 (H) 70 - 110 mg/dL   GLUCOSTABILIZER    Collection Time: 04/06/20 10:03 AM   Result Value Ref Range    Glucose 362 mg/dL    Insulin order 3.0 units/hour    Insulin adminstered 3.0 units/hour    Multiplier 0.010     Low target 140 mg/dL    High target 180 mg/dL    D50 order 0.0 ml    D50 administered 0.00 ml    Minutes until next BG 60 min    Order initials ab     Administered initials ab    GLUCOSE, POC    Collection Time: 04/06/20 11:10 AM   Result Value Ref Range    Glucose (POC) 263 (H) 70 - 110 mg/dL   GLUCOSTABILIZER    Collection Time: 04/06/20 11:24 AM   Result Value Ref Range    Glucose 263 mg/dL    Insulin order 2.0 units/hour    Insulin adminstered 2.0 units/hour    Multiplier 0.010     Low target 140 mg/dL    High target 180 mg/dL    D50 order 0.0 ml    D50 administered 0.00 ml    Minutes until next BG 60 min    Order initials ab     Administered initials ab          Chemistry Recent Labs     04/06/20  0956 04/06/20  0600   * 784*    132*   K 4.3 4.7    91*   CO2 18* 12*   BUN 26* 29*   CREA 1.54* 1.89*   CA 9.6 10.9*   MG 2.6 2.7*   PHOS 2.7  --    AGAP 15 29*   BUCR 17 15    141*   TP 6.6 7.7   ALB 3.5 4.2   GLOB 3.1 3.5   AGRAT 1.1 1.2        Lactic Acid Lactic acid   Date Value Ref Range Status   04/06/2020 5.0 (HH) 0.4 - 2.0 MMOL/L Final     Comment:     CALLED TO AND CORRECTLY REPEATED BY:  Darrow Epley RN ER TO [facility] AT 1263 ON [id number]       Recent Labs     04/06/20  0600   LAC 5.0*        Liver Enzymes Protein, total   Date Value Ref Range Status   04/06/2020 6.6 6.4 - 8.2 g/dL Final     Albumin   Date Value Ref Range Status   04/06/2020 3.5 3.4 - 5.0 g/dL Final     Globulin   Date Value Ref Range Status   04/06/2020 3.1 2.0 - 4.0 g/dL Final     A-G Ratio   Date Value Ref Range Status   04/06/2020 1.1 0.8 - 1.7   Final     AST (SGOT)   Date Value Ref Range Status   04/06/2020 14 10 - 38 U/L Final     Alk. phosphatase   Date Value Ref Range Status   04/06/2020 117 45 - 117 U/L Final     Recent Labs     04/06/20  0956 04/06/20  0600   TP 6.6 7.7   ALB 3.5 4.2   GLOB 3.1 3.5   AGRAT 1.1 1.2   SGOT 14 17    141*        CBC w/Diff Recent Labs     04/06/20  0600   WBC 23.8*   RBC 5.47   HGB 16.3*   HCT 46.6      GRANS 90*   LYMPH 4*   EOS 0        Cardiac Enzymes No results found for: CPK, CK, CKMMB, CKMB, RCK3, CKMBT, CKNDX, CKND1, CHRISTAL, TROPT, TROIQ, VENKATESH, TROPT, TNIPOC, BNP, BNPP     BNP No results found for: BNP, BNPP, XBNPT     Coagulation No results for input(s): PTP, INR, APTT, INREXT, INREXT in the last 72 hours. Thyroid  No results found for: T4, T3U, TSH, TSHEXT, TSHEXT    No results found for: T4     Urinalysis Lab Results   Component Value Date/Time    Color YELLOW 08/04/2019 03:30 PM    Appearance CLEAR 08/04/2019 03:30 PM    Specific gravity 1.016 08/04/2019 03:30 PM    pH (UA) 5.5 08/04/2019 03:30 PM    Protein TRACE (A) 08/04/2019 03:30 PM    Glucose NEGATIVE  08/04/2019 03:30 PM    Ketone TRACE (A) 08/04/2019 03:30 PM    Bilirubin NEGATIVE  08/04/2019 03:30 PM    Urobilinogen 1.0 08/04/2019 03:30 PM    Nitrites NEGATIVE  08/04/2019 03:30 PM    Leukocyte Esterase NEGATIVE  08/04/2019 03:30 PM    Epithelial cells FEW 08/04/2019 03:30 PM    Bacteria NEGATIVE  08/04/2019 03:30 PM    WBC 0 to 3 08/04/2019 03:30 PM    RBC 0 to 3 08/04/2019 03:30 PM        Micro  No results for input(s): SDES, CULT in the last 72 hours. No results for input(s): CULT in the last 72 hours. Culture data during this hospitalization.    All Micro Results     None           Images report reviewed by me:  CT (Most Recent) (CT chest reviewed by me) Results from Hospital Encounter encounter on 08/04/19   CT HEAD WO CONT    Narrative EXAM: CT head    CLINICAL HISTORY/INDICATION: Seizures new or progressive; seizure  -Additional: None    COMPARISON: 11/01/13    TECHNIQUE: Axial CT imaging of the head was performed without intravenous  contrast.  One or more dose reduction techniques were used on this CT: automated  exposure control, adjustment of the mAs and/or kVp according to patient size,  and iterative reconstruction techniques. The specific techniques used on this  CT exam have been documented in the patient's electronic medical record. Digital  Imaging and Communications in Medicine (DICOM) format image data are available  to nonaffiliated external healthcare facilities or entities on a secure, media  free, reciprocally searchable basis with patient authorization for at least a  12-month period after this study. _______________    FINDINGS:    Brain And Posterior Fossa: The sulci, folia, ventricles and basal cisterns are  within normal limits for the patient's age. There is no intracranial hemorrhage,  mass effect, or midline shift. There are no areas of abnormal parenchymal  attenuation. Extra-Axial Spaces And Meninges: There are no abnormal extra-axial fluid  collections. Calvarium: Intact. Sinuses: Clear. Other: None.    _______________      Impression IMPRESSION:    No acute intracranial abnormalities. CXR reviewed by me:  XR (Most Recent). CXR  reviewed by me and compared with previous CXR Results from Hospital Encounter encounter on 04/06/20   XR CHEST PORT    Narrative EXAM: XR CHEST PORT    CLINICAL INDICATION/HISTORY: leukocytosis, dka  -Additional:    COMPARISON: 8/4/2019    TECHNIQUE: Portable frontal view of the chest    _______________    FINDINGS:    SUPPORT DEVICES: None. HEART AND MEDIASTINUM: Cardiomediastinal silhouette within normal limits. LUNGS AND PLEURAL SPACES: No dense consolidation, large effusion or  pneumothorax.    _______________      Impression IMPRESSION:    No acute cardiopulmonary abnormality.             Damon Matthews MD  4/6/2020

## 2020-04-06 NOTE — PROGRESS NOTES
Reason for Admission:   High Blood Sugar                   RUR Score:    8%                 Plan for utilizing home health:  TBD [facility])        PCP: First and Last name:     Name of Practice:    Are you a current patient: Yes/No:    Approximate date of last visit:                     Current Advanced Directive/Advance Care Plan: Not on file                         Transition of Care Plan:    Home with physician follow up vs [facility] and physician follow up                   Chart reviewed. Per physician documentation Paulina Matthews is a 21 y.o. male who presents to the emergency department C/O abdominal pain, nausea, vomiting, elevated blood sugar. Patient is a type I diabetic. EMS reports that the patient's blood glucose read \"high \". Patient states he obtains his insulin via pump and that the pump changes the amount of insulin that he receives based on his glucose sensor. He states the pump regulates all the insulin he gets and he does not give himself any supplemental insulin himself. He states he started feeling sick this morning with weakness and nausea vomiting. Localizes abdominal pain to the epigastric region. He states his pump got disconnected. \"      Noted pt has family in the area to assist.  Pt is independent. Pt would benefit from an [facility] visit upon discharge to ensure a safe transition home when medically stable. Please encourage ambulation to assist with identifying additional transition of care needs. 1420:  CM has been notified pt would like information to assist with his drinking. Pt will be provided information prior to discharge. CM to continue to follow and assist.     Care Management Interventions  PCP Verified by CM: Yes  Mode of Transport at Discharge:  Other (see comment)(Family)  Transition of Care Consult (CM Consult): Discharge Planning  Health Maintenance Reviewed: Yes  Current Support Network: Relative's Home  Confirm Follow Up Transport: Self  The Plan for Transition of Care is Related to the Following Treatment Goals : [facility] and physician follow up vs home with physician follow up   Discharge Location  Discharge Placement: Home with family assistance(vs [facility])

## 2020-04-06 NOTE — DIABETES MGMT
Diabetes Patient/Family Education Record  Factors That  May Influence Patients Ability  to Learn or  Comply with Recommendations   []   Language barrier    []   Cultural needs   [x]   Motivation    []   Cognitive limitation    []   Physical   []   Education    []   Physiological factors   []   Hearing/vision/speaking impairment   []   Denominational beliefs    []   Financial factors   [x]  Other: ETOH abuse   []  No factors identified at this time.      Person Instructed:   [x]   Patient   []   Family   []  Other     Preference for Learning:   [x]   Verbal   []   Written   []  Demonstration     Level of Comprehension & Competence:    []  Good                                      [x] Fair                                     []  Poor                             [x]  Needs Reinforcement   [x]  Teachback completed    Education Component:   [x]  Medication management, including confirmation of insulin pump rates    []  Nutritional management -obtain usual meal pattern   []  Exercise   []  Signs, symptoms, and treatment of hyperglycemia and hypoglycemia   [] Prevention, recognition and treatment of hyperglycemia and hypoglycemia   [x]  Importance of blood glucose monitoring ;encouraged pt to increase frequency of CGM use   []  Instruction on use of the blood glucose meter   [x]  Discuss the importance of HbA1C monitoring    []  Sick day guidelines   []  Proper use and disposal of lancets, needles, syringes or insulin pens (if appropriate)   []  Potential long-term complications (retinopathy, kidney disease, neuropathy, foot care)   [] Information about whom to contact in case of emergency or for more information    [x]  Goal:  Patient/family will demonstrate understanding of Diabetes Self Management Skills by: (date) ___6/30____  Plan for post-discharge education or self-management support:    [] Outpatient class schedule provided            [] Patient Declined    [] Scheduled for outpatient classes (date) _______  Verify:  Does patient understand how diabetes medications work? ______yes______________________  Does patient know what their most recent A1c is? _____yes______________________________  Does patient monitor glucose at home? _________infrequent__________________________________  Does patient have difficulty obtaining diabetes medications or testing supplies? ___________no______         Maria Isabel Berry MS, RN, CDE  Glycemic Control Team  [phone]  Pager [phone] (M-TH 8:00-4:30P)  *After Hours pager [phone]

## 2020-04-06 NOTE — DIABETES MGMT
INSULIN PUMP CONSULT/ Plan Of Care  How long have you had diabetes? Since 10years of age, T1DM  How long have you had an insulin pump? Insulin pump use for ~ 12 years, current Medtronic pump Sept 2019  Where did you learn how to use it? Endocrinology office  What is your blood glucose target? 120-160 mg/dL  How often do you usually test your blood glucose in a day? Sporadic when not wearing CGM  What was your most recent A1C and date?   11.4%  Who is your endocrinologist?     Dr. Brit Mi  When was your last visit to your endocrinologist?  January 2020    INSULIN PUMP    Brand of pump and model #:    Type of insulin used    Type of infusion set    What are your basal rate(s)? 12AM- 9AM 1.0 unit/hour;  9AM-4PM 1.3 units/hour 4PM-12AM 1.5 units/hour   What is your sensitivity factor? 60 mg/dL   What is your insulin to carbohydrate ratio? 1:6   What is your total daily dose? What conventional insulin dose do you use if you pump were inoperable?   (\"off pump plan\")      - per Endo note, pt set up insulin pump himself w/o training, runs high r/t fear of hypoglycemia, rarely has hypoglycemia  - Endo office will set up appointment with pump  so pt can utilize hybrid closed loop feature of Medtronic 670G    Sammy Fournier MS, RN, CDE  Glycemic Control Team  [phone]  Pager [phone] (M-TH 8:00-4:30P)  *After Hours pager [phone]

## 2020-04-06 NOTE — ED NOTES
Critical lab serum glucose called from lab 784, Dr. Terry Baltazar aware. RT at the bedside to draw abg.

## 2020-04-06 NOTE — DIABETES MGMT
GLYCEMIC CONTROL PROGRESS NOTE:    - consult received r/t DKA & pt on Glucostabilizer insulin gtt  - known h/o T1DM, last DKA admission in March 2019  - last Endo visit January 2020, per EMR pt with h/o of ETOH abuse, seizure activity and hyperglycemia  - pt told this writer he wants help with ETOH dependency (care manager notified)  - pt states insulin pump is in personal belongings bag  - pt unsure if there is a need to change insulin reservoir in pump, as required every 3 days  - pt wants to resume insulin pump after insulin gtt discontinued  - informed pt he will need to check insulin pump today to determine if new insulin reservoir required and have family member obtain insulin vial from apartment   - SMBG infrequently, pt wears Guardian CGM, states he sometimes forgets to put it on, reports improved A1C when in use ~ 8% range  - GC will continue to follow and make recommendations as needed    Recent Glucose Results:   Lab Results   Component Value Date/Time     (H) 04/06/2020 09:56 AM     (HH) 04/06/2020 06:00 AM    GLUCPOC 147 (H) 04/06/2020 03:29 PM    GLUCPOC 238 (H) 04/06/2020 01:45 PM    GLUCPOC 249 (H) 04/06/2020 12:27 PM       Harriet Chavez MS, RN, CDE  Glycemic Control Team  [phone]  Pager [phone] (M-TH 8:00-4:30P)  *After Hours pager [phone]

## 2020-04-06 NOTE — PROGRESS NOTES
Bedside shift change report received from CHANCE Lozada RN. Report included the following information SBAR, Kardex, Intake/Output, MAR, Recent Results, Med Rec Status and Cardiac Rhythm Sinus Tach and plan of care. 0830: Admission assessment complete. See flowsheet. 0900: Required documentation complete. See flowsheet.

## 2020-04-06 NOTE — ED NOTES
[facility] Pt has been initiated on his insulin gtt and is not in distress at present.

## 2020-04-06 NOTE — ED PROVIDER NOTES
EMERGENCY DEPARTMENT HISTORY AND PHYSICAL EXAM    Date: 4/6/2020  Patient Name: Devon Boas    History of Presenting Illness     Chief Complaint   Patient presents with    High Blood Sugar         History Provided By: Patient and EMS    Devon Boas is a 21 y.o. male who presents to the emergency department C/O abdominal pain, nausea, vomiting, elevated blood sugar. Patient is a type I diabetic. EMS reports that the patient's blood glucose read \"high \". Patient states he obtains his insulin via pump and that the pump changes the amount of insulin that he receives based on his glucose sensor. He states the pump regulates all the insulin he gets and he does not give himself any supplemental insulin himself. He states he started feeling sick this morning with weakness and nausea vomiting. Localizes abdominal pain to the epigastric region. He states his pump got disconnected.     PCP: Ja Hughes MD    Current Facility-Administered Medications   Medication Dose Route Frequency Provider Last Rate Last Dose    sodium chloride 0.9 % bolus infusion 1,000 mL  1,000 mL IntraVENous ONCE Vipul Oliva DO 2,000 mL/hr at 04/06/20 0635 1,000 mL at 04/06/20 0635    insulin regular (NOVOLIN R, HUMULIN R) 100 Units in 0.9% sodium chloride 100 mL infusion  0-50 Units/hr IntraVENous TITRATE Vipul Muse DO        glucose chewable tablet 16 g  4 Tab Oral PRN Vipul Muse DO        glucagon (GLUCAGEN) injection 1 mg  1 mg IntraMUSCular PRN Vipul Muse DO        dextrose 10% infusion 125-250 mL  125-250 mL IntraVENous PRN Vipul Muse DO        0.9% sodium chloride infusion  125 mL/hr IntraVENous CONTINUOUS Vipul Muse DO        sodium chloride (NS) flush 5-40 mL  5-40 mL IntraVENous Q8H Desiree Kwok MD        sodium chloride (NS) flush 5-40 mL  5-40 mL IntraVENous PRN Desiree Kwok MD        ondansetron [facility]) injection 4 mg  4 mg IntraVENous Q6H PRN Jeremiah Lancaster MD        heparin (porcine) injection 5,000 Units  5,000 Units SubCUTAneous Q8H Jeremiah Lancaster MD         Current Outpatient Medications   Medication Sig Dispense Refill    ibuprofen (MOTRIN) 600 mg tablet Take 1 Tab by mouth every six (6) hours as needed for Pain. 20 Tab 0    methocarbamol (ROBAXIN-750) 750 mg tablet Take 1 Tab by mouth four (4) times daily. 12 Tab 0    insulin syringe-needle U-100 (BD INSULIN SYRINGE SAFETYGLIDE) 1 mL 29 gauge x 1/2\" syrg 1 Syringe by Does Not Apply route Before breakfast, lunch, and dinner. 30 Syringe 0    lancets-blood glucose strips 30 gauge cmpk 1 Each by Does Not Apply route four (4) times daily. 1 Dose Pack 0    insulin lispro (HUMALOG U-100 INSULIN) 100 unit/mL injection Use as directed with insulin pump 1 Vial 0    Blood-Glucose Meter monitoring kit Use as directed. 1 Kit 0    fluoxetine HCl (PROZAC PO) Take 20 mg by mouth daily.  RANITIDINE HCL PO Take  by mouth.  promethazine HCl (PROMETHAZINE PO) Take  by mouth.  AMPHET ASP/AMPHET/D-AMPHET (ADDERALL PO) Take  by mouth.   insulin pump (PATIENT SUPPLIED) Misc by SubCUTAneous route as needed. Past History     Past Medical History:  Past Medical History:   Diagnosis Date    ADD (attention deficit disorder)     Diabetes ([facility] [address]     Insomnia     Seizure ([facility] [address]        Past Surgical History:  History reviewed. No pertinent surgical history. Family History:  History reviewed. No pertinent family history. Social History:  Social History     Tobacco Use    Smoking status: Never Smoker    Smokeless tobacco: Former User   Substance Use Topics    Alcohol use: Yes     Alcohol/week: 16.0 standard drinks     Types: 10 Cans of beer, 6 Shots of liquor per week    Drug use: Not Currently       Allergies:  No Known Allergies      Review of Systems   Review of Systems   Constitutional: Positive for fatigue.    Gastrointestinal: Positive for abdominal pain, nausea and vomiting. Neurological: Positive for weakness. Physical Exam     Vitals:    04/06/20 7187 04/06/20 0636 04/06/20 0637 04/06/20 0638   BP:       Pulse: (!) 132 (!) 134 (!) 136 (!) 138   Resp: 18 21 19 22   Temp:       SpO2: 100% 100% 97% 100%   Weight:       Height:         Physical Exam    Nursing notes and vital signs reviewed    Airway: intact, speaking normally  Breathing: No apparent distress, no cyanosis  Circulation: Peripheral pulses equal    Constitutional: Non toxic appearing, mild distress  HEENT & Neck: Normocephalic, Atraumatic, PERRL, EOMI, No rhinorrhea, external nose normal, external ears normal, dry mucous membranes, No stridor, No JVD  Cardiovascular: Tachycardic, regular rhythm, no murmurs  Chest: Normal work of breathing and chest excursion bilaterally  Lungs: Clear to ausculation bilaterally  Abdomen: Soft, epigastric tenderness to palpation, non distended, normoactive bowel sounds, No rigidity, no peritoneal signs  Musculoskeletal: No evidence of trauma or deformity to the back or neck  Extremities: No evidence of trauma or deformity, no LE edema  Skin: Warm, No obvious rashes  Neuro: Somnolent but awake oriented x 3, CN 2-12 intact, normal speech, strength and sensation full and symmetric bilaterally, normal coordination  Psychiatric: Normal mood and affect      Diagnostic Study Results     Labs -     Recent Results (from the past 72 hour(s))   CBC WITH AUTOMATED DIFF    Collection Time: 04/06/20  6:00 AM   Result Value Ref Range    WBC 23.8 (H) 4.6 - 13.2 K/uL    RBC 5.47 4.70 - 5.50 M/uL    HGB 16.3 (H) 13.0 - 16.0 g/dL    HCT 46.6 36.0 - 48.0 %    MCV 85.2 74.0 - 97.0 FL    MCH 29.8 24.0 - 34.0 PG    MCHC 35.0 31.0 - 37.0 g/dL    RDW 13.3 11.6 - 14.5 %    PLATELET 769 485 - 472 K/uL    MPV 11.2 9.2 - 11.8 FL    NEUTROPHILS 90 (H) 42 - 75 %    BAND NEUTROPHILS 3 0 - 5 %    LYMPHOCYTES 4 (L) 20 - 51 %    MONOCYTES 3 2 - 9 %    EOSINOPHILS 0 0 - 5 %    BASOPHILS 0 0 - 3 %    ABS. NEUTROPHILS 21.4 (H) 1.8 - 8.0 K/UL    ABS. LYMPHOCYTES 1.0 0.8 - 3.5 K/UL    ABS. MONOCYTES 0.7 0 - 1.0 K/UL    ABS. EOSINOPHILS 0.0 0.0 - 0.4 K/UL    ABS. BASOPHILS 0.0 0.0 - 0.1 K/UL    PLATELET COMMENTS LARGE PLATELETS      RBC COMMENTS NORMOCYTIC, NORMOCHROMIC      DF MANUAL     METABOLIC PANEL, COMPREHENSIVE    Collection Time: 04/06/20  6:00 AM   Result Value Ref Range    Sodium 132 (L) 136 - 145 mmol/L    Potassium 4.7 3.5 - 5.5 mmol/L    Chloride 91 (L) 100 - 111 mmol/L    CO2 12 (L) 21 - 32 mmol/L    Anion gap 29 (H) 3.0 - 18 mmol/L    Glucose 784 (HH) 74 - 99 mg/dL    BUN 29 (H) 7.0 - 18 MG/DL    Creatinine 1.89 (H) 0.6 - 1.3 MG/DL    BUN/Creatinine ratio 15 12 - 20      GFR est AA 53 (L) >60 ml/min/1.73m2    GFR est non-AA 44 (L) >60 ml/min/1.73m2    Calcium 10.9 (H) 8.5 - 10.1 MG/DL    Bilirubin, total 3.0 (H) 0.2 - 1.0 MG/DL    ALT (SGPT) 22 16 - 61 U/L    AST (SGOT) 17 10 - 38 U/L    Alk. phosphatase 141 (H) 45 - 117 U/L    Protein, total 7.7 6.4 - 8.2 g/dL    Albumin 4.2 3.4 - 5.0 g/dL    Globulin 3.5 2.0 - 4.0 g/dL    A-G Ratio 1.2 0.8 - 1.7     LIPASE    Collection Time: 04/06/20  6:00 AM   Result Value Ref Range    Lipase 11 (L) 73 - 393 U/L   MAGNESIUM    Collection Time: 04/06/20  6:00 AM   Result Value Ref Range    Magnesium 2.7 (H) 1.6 - 2.6 mg/dL   GLUCOSE, POC    Collection Time: 04/06/20  6:01 AM   Result Value Ref Range    Glucose (POC) >600 (HH) 70 - 110 mg/dL   POC G3    Collection Time: 04/06/20  6:43 AM   Result Value Ref Range    Device: ROOM AIR      FIO2 (POC) 0.21 %    pH (POC) 7.287 (L) 7.35 - 7.45      pCO2 (POC) 23.2 (L) 35.0 - 45.0 MMHG    pO2 (POC) 117 (H) 80 - 100 MMHG    HCO3 (POC) 11.1 (L) 22 - 26 MMOL/L    sO2 (POC) 98 (H) 92 - 97 %    Base deficit (POC) 16 mmol/L    Allens test (POC) N/A      Total resp. rate 23      Site RIGHT BRACHIAL      Patient temp.  98.7      Specimen type (POC) ARTERIAL      Performed by Felipe Veronica        Radiologic Studies -   XR CHEST PORT (Results Pending)     CT Results  (Last 48 hours)    None        CXR Results  (Last 48 hours)    None          Medications given in the ED-  Medications   sodium chloride 0.9 % bolus infusion 1,000 mL (1,000 mL IntraVENous New Bag 4/6/20 0635)   insulin regular (NOVOLIN R, HUMULIN R) 100 Units in 0.9% sodium chloride 100 mL infusion (has no administration in time range)   glucose chewable tablet 16 g (has no administration in time range)   glucagon (GLUCAGEN) injection 1 mg (has no administration in time range)   dextrose 10% infusion 125-250 mL (has no administration in time range)   0.9% sodium chloride infusion (has no administration in time range)   sodium chloride (NS) flush 5-40 mL (has no administration in time range)   sodium chloride (NS) flush 5-40 mL (has no administration in time range)   ondansetron (ZOFRAN) injection 4 mg (has no administration in time range)   heparin (porcine) injection 5,000 Units (has no administration in time range)   sodium chloride 0.9 % bolus infusion 1,000 mL (1,000 mL IntraVENous New Bag 4/6/20 0619)   metoclopramide HCl (REGLAN) injection 10 mg (10 mg IntraVENous Given 4/6/20 3801)   insulin regular (NOVOLIN R, HUMULIN R) injection 10 Units (10 Units IntraVENous Given 4/6/20 0186)         Medical Decision Making   I am the first provider for this patient. I reviewed the vital signs, available nursing notes, past medical history, past surgical history, family history and social history. Vital Signs-Reviewed the patient's vital signs. Pulse Oximetry Analysis - 100% on Room air     Cardiac Monitor:  Rate: 130 bpm  Rhythm: sinus    Records Reviewed: Nursing Notes and Old Medical Records    Procedures:  Procedures    ED Course:   Considering the patient's tachycardia as well as nausea vomiting and his history of diabetes with blood sugar greater than 600 will obtain blood work to evaluate for DKA.     Patient given 1 L fluid bolus and 10 units of insulin IV for his blood sugar greater than 600 by POC    Laboratory findings showing evidence of DKA. Another 1 L fluid bolus was ordered of normal saline and will continue with IV fluids at 100 cc/h. His potassium was noted to be 4.7. Will initiate glucose stabilizer order set with insulin infusion. His insulin pump was turned off. He is noted to have leukocytosis, with Sirs, as well although this could be secondary to his DKA at this time as the patient has no fever or recent history of any illnesses to include cough or urinary symptoms. Less likely sepsis in nature although that diagnosis is still potentially on the table as the inciting event of his DKA although it is more likely due to pump failure mechanically. Will obtain chest x-ray and UA for further evaluation. Patient will need admission to critical care unit considering his acidosis. ABG showing evidence of metabolic acidosis as well as respiratory alkalosis which does fit with his DKA picture. I discussed with the patient the results of his laboratory findings and imaging studies. Recommended admission. He understands and agrees to plan    Diagnosis and Disposition     Critical Care Time: 7:01 AM  I have spent 30 minutes of critical care time involved in lab review, consultations with specialist, family decision-making, and documentation. During this entire length of time I was immediately available to the patient. Critical Care: The reason for providing this level of medical care for this critically ill patient was due a critical illness that impaired one or more vital organ systems such that there was a high probability of imminent or life threatening deterioration in the patients condition. This care involved high complexity decision making to assess, manipulate, and support vital system functions, to treat this degreee vital organ system failure and to prevent further life threatening deterioration of the patients condition. Core Measures:   For Hospitalized Patients:    1. Hospitalization Decision Time:  The decision to hospitalize the patient was made by Varun Castro DO at 7:01 AM on 4/6/2020    2. Aspirin: Aspirin was not given because the patient did not present with a stroke at the time of their Emergency Department evaluation    7:00 AM  Patient is being admitted to the hospital by Dr. Efraín Clemente. The results of their tests and reasons for their admission have been discussed with them and/or available family. They convey agreement and understanding for the need to be admitted and for their admission diagnosis. CONDITIONS ON ADMISSION:  Sepsis possibly but less likely present at the time of admission. Deep Vein Thrombosis is not present at the time of admission. Thrombosis is not present at the time of admission. Urinary Tract Infection possibly present at the time of admission. Pneumonia is not present at the time of admission. MRSA possibly present at the time of admission. Wound infection is not present at the time of admission. Pressure Ulcer is not present at the time of admission. CLINICAL IMPRESSION:    1. Type 1 diabetes mellitus with ketoacidosis without coma ([address]    2. DON (acute kidney injury) ([address]    3. Dehydration    4. Metabolic acidosis    5. Leukocytosis, unspecified type    6. SIRS (systemic inflammatory response syndrome) ([address]          Please note that this dictation was completed with [employer], the computer voice recognition software. Quite often unanticipated grammatical, syntax, homophones, and other interpretive errors are inadvertently transcribed by the computer software. Please disregard these errors. Please excuse any errors that have escaped final proofreading.

## 2020-04-06 NOTE — H&P
Patient: Cameron Matthews MRN: [id number]  CSN: [id number]    YOB: 1995  Age: 21 y.o. Sex: male       DOA: 4/6/2020     Chief Complaint:       Chief Complaint   Patient presents with    High Blood Sugar           HPI:      Cameron Matthews is a 21 y.o.  male who has history of diabetes, attention deficit, alcoholism, and with withdrawal seizures presents to the emergency room with nausea and vomiting that has been persistent since Friday. Patient admits to drinking over 600 cc of [place] and since then has had intractable vomiting and generalized weakness. Patient has been alcohol free for about a month but then had a recent break-up and started drinking 6-12 beers a day. He has not had a seizure in over a year this was related to alcohol. He last drank at 2:00 in the morning on Saturday. In the emergency room he was found to have acute kidney injury DKA. I am asked to admit for the above findings. Patient denies recent marijuana use since January and has not used cocaine in about a year. He denies any suicidal thoughts he has been off his depression medication Zoloft for the last month and a half. Patient contracts to safety.     [employer] risk factors  Has been self isolating  Has a roommate but has not seen the roommate in 2 weeks  Has only been around immediate family denies fever cough shortness of breath or recent travel. Family members are all well and have all practice social distancing do his to his mother having asthma       Past Medical History:   Diagnosis Date    ADD (attention deficit disorder)      [facility] ([facility]      Insomnia      Seizure ([facility]           History reviewed. No pertinent surgical history.     History reviewed.  No pertinent family history.     Social History               Socioeconomic History    Marital status: SINGLE       Spouse name: Not on file    Number of children: Not on file    Years of education: Not on file    Highest education level: Not on file   Tobacco Use    Smoking status: Never Smoker    Smokeless tobacco: Former User   Substance and Sexual Activity    Alcohol use: Yes       Alcohol/week: 16.0 standard drinks       Types: 10 Cans of beer, 6 Shots of liquor per week    Drug use: Not Currently                    Prior to Admission medications    Medication Sig Start Date End Date Taking? Authorizing Provider   ibuprofen (MOTRIN) 600 mg tablet Take 1 Tab by mouth every six (6) hours as needed for Pain. 1/5/20     Anuj Donnelly PA   methocarbamol (ROBAXIN-750) 750 mg tablet Take 1 Tab by mouth four (4) times daily. 1/5/20     Anuj Donnelly PA   insulin syringe-needle U-100 (BD INSULIN SYRINGE SAFETYGLIDE) 1 mL 29 gauge x 1/2\" syrg 1 Syringe by Does Not Apply route Before breakfast, lunch, and dinner. 4/4/19     Anuj Donnelly PA   lancets-blood glucose strips 30 gauge cmpk 1 Each by Does Not Apply route four (4) times daily. 4/3/19     Mihir Santillan PA-C   insulin lispro (HUMALOG U-100 INSULIN) 100 unit/mL injection Use as directed with insulin pump 4/3/19     Mihir Santillan PA-C   Blood-Glucose Meter monitoring kit Use as directed. 4/3/19     Mihir Santillan PA-C   fluoxetine HCl (PROZAC PO) Take 20 mg by mouth daily.       Brittni Pike MD   RANITIDINE HCL PO Take  by mouth.       Brittni Pike MD   promethazine HCl (PROMETHAZINE PO) Take  by mouth.       Brittni Pike MD   AMPHET ASP/AMPHET/D-AMPHET (ADDERALL PO) Take  by mouth.       Brittni Pike MD    insulin pump (PATIENT SUPPLIED) Misc by SubCUTAneous route as needed.       Brittni Pike MD         No Known Allergies        Review of Systems  GENERAL: Patient alert, awake and oriented times 3, able to communicate full sentences and not in distress. HEENT: No change in vision, no earache, tinnitus, sore throat or sinus congestion. NECK: No pain or stiffness. PULMONARY: No shortness of breath, cough or wheeze.    Cardiovascular: no pnd or orthopnea, no CP  GASTROINTESTINAL: No abdominal pain,+ nausea, +vomiting or diarrhea, melena or bright red blood per rectum. GENITOURINARY: No urinary frequency, urgency, hesitancy or dysuria. MUSCULOSKELETAL: No joint or muscle pain, no back pain, no recent trauma. DERMATOLOGIC: No rash, no itching, no lesions. ENDOCRINE: No polyuria, polydipsia, no heat or cold intolerance. No recent change in weight. HEMATOLOGICAL: No anemia or easy bruising or bleeding. NEUROLOGIC: No headache, seizures, numbness, tingling or weakness.         Physical Exam:      Physical Exam:  Visit Vitals  /68   Pulse (!) 138   Temp 98.4 °F (36.9 °C)   Resp 22   Ht 5' 11\" (1.803 m)   Wt 77.1 kg (170 lb)   SpO2 100%   BMI 23.71 kg/m²      O2 Device: Room air     Temp (24hrs), Av.4 °F (36.9 °C), Min:98.4 °F (36.9 °C), Max:98.4 °F (36.9 °C)    No intake/output data recorded. No intake/output data recorded.     General:  Alert, cooperative, no distress, appears stated age. Head: Normocephalic, without obvious abnormality, atraumatic. Eyes:  Conjunctivae/corneas clear. PERRL, EOMs intact. Nose: Nares normal. No drainage or sinus tenderness. Neck: Supple, symmetrical, trachea midline, no adenopathy, thyroid: no enlargement, no carotid bruit and no JVD. Lungs:   Clear to auscultation bilaterally. Heart:  Regular rate and rhythm, S1, S2 normal.     Abdomen: Soft, non-tender. Bowel sounds normal.    Extremities: Extremities normal, atraumatic, no cyanosis or edema. Pulses: 2+ and symmetric all extremities. Skin:  No rashes or lesions   Neurologic: AAOx3, No focal motor or sensory deficit.         Labs Reviewed:     All lab results for the last 24 hours reviewed.    and EKG     Procedures/imaging: see electronic medical records for all procedures/Xrays and details which were not copied into this note but were reviewed prior to creation of Plan        Assessment/Plan      Active Problems:    DON (acute kidney injury) ([facility] (4/6/2020)       Dehydration (4/6/2020)       DKA, type 1 ([facility] (2/9/9382)       Metabolic acidosis (7/9/6791)       Leukocytosis (4/6/2020)       SIRS (systemic inflammatory response syndrome) ([facility] (4/6/2020)  Plan     Cardiovascular[de-identified] Tachycardia with no significant arrhythmia will monitor electrolytes and replace as needed suspect this is from prerenal azotemia     Endocrine: DKA placed on insulin drip will obtain serial BMP and magnesium aggressive fluids     Renal:  Acute kidney injury started on bicarb drip with aggressive fluids will monitor BMP and replace electrolytes accordingly     ID: Severe leukocytosis tachycardia suspicious for Sirs but could all be from DKA did not start any antibiotics as there is no clear source of infection blood cultures were not obtained in the emergency room chest x-ray is normal will check a urine analysis suspect leukocytosis should improve as DKA improve     Neuro: Psych: Depression contracts to safety holding Zoloft did not want to restart  Alcohol abuse placed on CIWA monitor for seizures started on banana bag daily        DVT/GI Prophylaxis: Hep SQ Protonix IV     Discussed with patient at bedside about hospital admission and my plan care, who understood and agree with my plan care.     Juan Diego Luque MD  4/6/2020 7:28 AM      Revision History

## 2020-04-06 NOTE — PROGRESS NOTES
Problem: Diabetes Self-Management  Goal: *Disease process and treatment process  Description: Define diabetes and identify own type of diabetes; list 3 options for treating diabetes.   Outcome: Progressing Towards Goal     Problem: Alcohol Withdrawal  Goal: *STG: Participates in treatment plan  Outcome: Progressing Towards Goal     Problem: Alcohol Withdrawal  Goal: *STG: Remains safe in hospital  Outcome: Progressing Towards Goal     Problem: Alcohol Withdrawal  Goal: *STG: Seeks staff when symptoms of withdrawal increase  Outcome: Progressing Towards Goal     Problem: Alcohol Withdrawal  Goal: *STG: Complies with medication therapy  Outcome: Progressing Towards Goal     Problem: Alcohol Withdrawal  Goal: *STG: Verbalizes abstinence as an achievable goal  Outcome: Progressing Towards Goal     Problem: Alcohol Withdrawal  Goal: *STG: Vital signs within defined limits  Outcome: Progressing Towards Goal     Problem: Nausea/Vomiting (Adult)  Goal: *Absence of nausea/vomiting  Outcome: Resolved/Met     Problem: Nausea/Vomiting (Adult)  Goal: *Palliation of nausea/vomiting (Palliative Care)  Outcome: Resolved/Met

## 2020-04-06 NOTE — ED TRIAGE NOTES
Patient brought to ED by EMS c/c elevated blood sugar, read \"high\" per EMS. Pt reports checking FSBS at home, last dose insulin given en route to ED via insulin pump.

## 2020-04-06 NOTE — CDMP QUERY
Patient admitted with DKA. Noted documentation of SIRS  in H&P Please document in progress notes clinical indicators (such as the ones below) to support this diagnosis or if  may have been ruled out after study. SIRS with organ dysfunction of lactic Acid 5.0 SIRS with out organ dysfunction of lactic acid Other Explanation Cannot determine 1. At least 2 SIRS Criteria (Systemic Inflammatory Response Syndrome) (CMS) 
-Temp > 100.9 or < 96.8 
-HR > 90 
-RR > 20  
-WBC > 12,000 or < 4,000 or > 10% bands 2. Documented suspected or confirmed source of infection. (CMS) 3. Documented Organ Dysfunction by any ONE of the following. (CMS) 
     
 the CMS guidelines 
-AMS (from baseline if known) -SBP<90 or MAP<65 
-Documentation of respiratory failure and use of either invasive mechanical ventilation (intubation) or non-invasive mechanical ventilation (CPAP/BIPAP) -Creat. > 2.0 (with no history of Chronic Kidney Disease) -Bilirubin > 2 mg / dl (with no history of liver disease) -PLT < 100,000 (with no history of thrombocytopenia) -INR > 1.5 or aPTT > 60 sec (for patients not on Warfarin therapy) -Lactic Acid > 2 mmol/ L The medical record reflects the following: 
   Risk Factors:  DKA Clinical Indicators: WBC 23.8, Lactic acid 5.2  Treatment: IV fluids, no antibotic' Thank- You Haley Llanes RN [facility] Cell ( [phone]

## 2020-04-06 NOTE — DIABETES MGMT
GLYCEMIC CONTROL PROGRESS NOTE:    - consult received r/t DKA & pt on Glucostabilizer insulin drip, pt with known h/o DM1  - recommend continue drip overnight at this point, pt DOES NOT MEET CRITERIA AT THIS TIME to transition to basal/bolus sub-q  - need two CO2 wnl x 2 lab draws (not met)  - need Anion gap <12 x 2 lab draws (not met)  - need drip rate multiplier to be 0.02 and steady or decreasing x 4 hours (not met)  - need BG to within target range x 4 hours (not met)                Recent Glucose Results:   Lab Results   Component Value Date/Time     (H) 04/06/2020 09:56 AM     (HH) 04/06/2020 06:00 AM    GLUCPOC 238 (H) 04/06/2020 01:45 PM    GLUCPOC 249 (H) 04/06/2020 12:27 PM    GLUCPOC 263 (H) 04/06/2020 11:10 AM         Joann Miles MS, RN, CDE  Glycemic Control Team  [phone]  Pager [phone] (M-TH 8:00-4:30P)  *After Hours pager [phone]

## 2020-04-06 NOTE — ED NOTES
BIBA for c/o elevated cbg and n/v that began Sunday at aprox 0800. Pt states he thought he may of had a \"hangover\" from the night before. Pt states he did give himself insulin via his insulin pump in route, but is unsure how much. Pt has turned his insulin pump off.

## 2020-04-06 NOTE — PROGRESS NOTES
History & Physical    Patient: Puma Schmid MRN: [id number]  CSN: [id number]    YOB: 1995  Age: 21 y.o. Sex: male      DOA: 4/6/2020    Chief Complaint:   Chief Complaint   Patient presents with    High Blood Sugar          HPI:     Puma Schmid is a 21 y.o.  male who has history of diabetes, attention deficit, alcoholism, and with withdrawal seizures presents to the emergency room with nausea and vomiting that has been persistent since Friday. Patient admits to drinking over 600 cc of [place] and since then has had intractable vomiting and generalized weakness. Patient has been alcohol free for about a month but then had a recent break-up and started drinking 6-12 beers a day. He has not had a seizure in over a year this was related to alcohol. He last drank at 2:00 in the morning on Saturday. In the emergency room he was found to have acute kidney injury DKA. I am asked to admit for the above findings. Patient denies recent marijuana use since January and has not used cocaine in about a year. He denies any suicidal thoughts he has been off his depression medication Zoloft for the last month and a half. Patient contracts to safety. [place] risk factors  Has been self isolating  Has a roommate but has not seen the roommate in 2 weeks  Has only been around immediate family denies fever cough shortness of breath or recent travel. Family members are all well and have all practice social distancing do his to his mother having asthma  Past Medical History:   Diagnosis Date    ADD (attention deficit disorder)     Diabetes ([facility] [address]     Insomnia     Seizure ([facility] [address] [facility]        History reviewed. No pertinent surgical history. History reviewed. No pertinent family history.     Social History     Socioeconomic History    Marital status: SINGLE     Spouse name: Not on file    Number of children: Not on file    Years of education: Not on file    Highest education level: Not on file   Tobacco Use    Smoking status: Never Smoker    Smokeless tobacco: Former User   Substance and Sexual Activity    Alcohol use: Yes     Alcohol/week: 16.0 standard drinks     Types: 10 Cans of beer, 6 Shots of liquor per week    Drug use: Not Currently       Prior to Admission medications    Medication Sig Start Date End Date Taking? Authorizing Provider   ibuprofen (MOTRIN) 600 mg tablet Take 1 Tab by mouth every six (6) hours as needed for Pain. 1/5/20   NONA Curry   methocarbamol (ROBAXIN-750) 750 mg tablet Take 1 Tab by mouth four (4) times daily. 1/5/20   Muna Donnelly PA   insulin syringe-needle U-100 (BD INSULIN SYRINGE SAFETYGLIDE) 1 mL 29 gauge x 1/2\" syrg 1 Syringe by Does Not Apply route Before breakfast, lunch, and dinner. 4/4/19   Muna Donnelly PA   lancets-blood glucose strips 30 gauge cmpk 1 Each by Does Not Apply route four (4) times daily. 4/3/19   Jeffrey Chacko PA-C   insulin lispro (HUMALOG U-100 INSULIN) 100 unit/mL injection Use as directed with insulin pump 4/3/19   Jeffrey Chacko PA-C   Blood-Glucose Meter monitoring kit Use as directed. 4/3/19   Jeffrey Chacko PA-C   fluoxetine HCl (PROZAC PO) Take 20 mg by mouth daily. Brittni Pike MD   RANITIDINE HCL PO Take  by mouth. Brittni Pike MD   promethazine HCl (PROMETHAZINE PO) Take  by mouth. Brittni Pike MD   AMPHET ASP/AMPHET/D-AMPHET (ADDERALL PO) Take  by mouth. Brittni Pike MD    insulin pump (PATIENT SUPPLIED) Misc by SubCUTAneous route as needed. Brittni Pike MD       No Known Allergies      Review of Systems  GENERAL: Patient alert, awake and oriented times 3, able to communicate full sentences and not in distress. HEENT: No change in vision, no earache, tinnitus, sore throat or sinus congestion. NECK: No pain or stiffness. PULMONARY: No shortness of breath, cough or wheeze.    Cardiovascular: no pnd or orthopnea, no CP  GASTROINTESTINAL: No abdominal pain,+ nausea, +vomiting or diarrhea, melena or bright red blood per rectum. GENITOURINARY: No urinary frequency, urgency, hesitancy or dysuria. MUSCULOSKELETAL: No joint or muscle pain, no back pain, no recent trauma. DERMATOLOGIC: No rash, no itching, no lesions. ENDOCRINE: No polyuria, polydipsia, no heat or cold intolerance. No recent change in weight. HEMATOLOGICAL: No anemia or easy bruising or bleeding. NEUROLOGIC: No headache, seizures, numbness, tingling or weakness. Physical Exam:     Physical Exam:  Visit Vitals  /68   Pulse (!) 138   Temp 98.4 °F (36.9 °C)   Resp 22   Ht 5' 11\" (1.803 m)   Wt 77.1 kg (170 lb)   SpO2 100%   BMI 23.71 kg/m²      O2 Device: Room air    Temp (24hrs), Av.4 °F (36.9 °C), Min:98.4 °F (36.9 °C), Max:98.4 °F (36.9 °C)    No intake/output data recorded. No intake/output data recorded. General:  Alert, cooperative, no distress, appears stated age. Head: Normocephalic, without obvious abnormality, atraumatic. Eyes:  Conjunctivae/corneas clear. PERRL, EOMs intact. Nose: Nares normal. No drainage or sinus tenderness. Neck: Supple, symmetrical, trachea midline, no adenopathy, thyroid: no enlargement, no carotid bruit and no JVD. Lungs:   Clear to auscultation bilaterally. Heart:  Regular rate and rhythm, S1, S2 normal.     Abdomen: Soft, non-tender. Bowel sounds normal.    Extremities: Extremities normal, atraumatic, no cyanosis or edema. Pulses: 2+ and symmetric all extremities. Skin:  No rashes or lesions   Neurologic: AAOx3, No focal motor or sensory deficit. Labs Reviewed: All lab results for the last 24 hours reviewed.    and EKG    Procedures/imaging: see electronic medical records for all procedures/Xrays and details which were not copied into this note but were reviewed prior to creation of Plan      Assessment/Plan     Active Problems:    DON (acute kidney injury) ([facility] (2020)      Dehydration (2020)      DKA, type 1 ([facility] (1/1/7214)      Metabolic acidosis (2/4/5466)      Leukocytosis (4/6/2020)      SIRS (systemic inflammatory response syndrome) ([facility] (4/6/2020)  Plan    Cardiovascular[de-identified] Tachycardia with no significant arrhythmia will monitor electrolytes and replace as needed suspect this is from prerenal azotemia    Endocrine: DKA placed on insulin drip will obtain serial BMP and magnesium aggressive fluids    Renal:  Acute kidney injury started on bicarb drip with aggressive fluids will monitor BMP and replace electrolytes accordingly    ID: Severe leukocytosis tachycardia suspicious for Sirs but could all be from DKA did not start any antibiotics as there is no clear source of infection blood cultures were not obtained in the emergency room chest x-ray is normal will check a urine analysis suspect leukocytosis should improve as DKA improve    Neuro: Psych: Depression contracts to safety holding Zoloft did not want to restart  Alcohol abuse placed on CIWA monitor for seizures started on banana bag daily       DVT/GI Prophylaxis: Hep SQ Protonix IV    Discussed with patient at bedside about hospital admission and my plan care, who understood and agree with my plan care.     Ena Sibley MD  4/6/2020 7:28 AM

## 2020-04-07 PROBLEM — E86.0 DEHYDRATION: Status: RESOLVED | Noted: 2020-04-06 | Resolved: 2020-04-07

## 2020-04-07 LAB
ADMINISTERED INITIALS, ADMINIT: NORMAL
ALBUMIN SERPL-MCNC: 2.8 G/DL (ref 3.4–5)
ALBUMIN/GLOB SERPL: 1.1 {RATIO} (ref 0.8–1.7)
ALP SERPL-CCNC: 88 U/L (ref 45–117)
ALT SERPL-CCNC: 17 U/L (ref 16–61)
ANION GAP SERPL CALC-SCNC: 4 MMOL/L (ref 3–18)
ANION GAP SERPL CALC-SCNC: 6 MMOL/L (ref 3–18)
ANION GAP SERPL CALC-SCNC: 7 MMOL/L (ref 3–18)
ANION GAP SERPL CALC-SCNC: 8 MMOL/L (ref 3–18)
AST SERPL-CCNC: 17 U/L (ref 10–38)
BASOPHILS # BLD: 0 K/UL (ref 0–0.1)
BASOPHILS NFR BLD: 0 % (ref 0–2)
BILIRUB SERPL-MCNC: 2.1 MG/DL (ref 0.2–1)
BUN SERPL-MCNC: 12 MG/DL (ref 7–18)
BUN SERPL-MCNC: 13 MG/DL (ref 7–18)
BUN SERPL-MCNC: 15 MG/DL (ref 7–18)
BUN SERPL-MCNC: 17 MG/DL (ref 7–18)
BUN/CREAT SERPL: 12 (ref 12–20)
BUN/CREAT SERPL: 14 (ref 12–20)
BUN/CREAT SERPL: 15 (ref 12–20)
BUN/CREAT SERPL: 16 (ref 12–20)
CALCIUM SERPL-MCNC: 8.1 MG/DL (ref 8.5–10.1)
CALCIUM SERPL-MCNC: 8.4 MG/DL (ref 8.5–10.1)
CALCIUM SERPL-MCNC: 8.4 MG/DL (ref 8.5–10.1)
CALCIUM SERPL-MCNC: 8.6 MG/DL (ref 8.5–10.1)
CHLORIDE SERPL-SCNC: 110 MMOL/L (ref 100–111)
CO2 SERPL-SCNC: 24 MMOL/L (ref 21–32)
CO2 SERPL-SCNC: 25 MMOL/L (ref 21–32)
CO2 SERPL-SCNC: 26 MMOL/L (ref 21–32)
CO2 SERPL-SCNC: 26 MMOL/L (ref 21–32)
CREAT SERPL-MCNC: 0.82 MG/DL (ref 0.6–1.3)
CREAT SERPL-MCNC: 1.04 MG/DL (ref 0.6–1.3)
CREAT SERPL-MCNC: 1.04 MG/DL (ref 0.6–1.3)
CREAT SERPL-MCNC: 1.06 MG/DL (ref 0.6–1.3)
D50 ADMINISTERED, D50ADM: 0 ML
D50 ORDER, D50ORD: 0 ML
DIFFERENTIAL METHOD BLD: ABNORMAL
EOSINOPHIL # BLD: 0 K/UL (ref 0–0.4)
EOSINOPHIL NFR BLD: 0 % (ref 0–5)
ERYTHROCYTE [DISTWIDTH] IN BLOOD BY AUTOMATED COUNT: 13.4 % (ref 11.6–14.5)
GLOBULIN SER CALC-MCNC: 2.5 G/DL (ref 2–4)
GLUCOSE BLD STRIP.AUTO-MCNC: 114 MG/DL (ref 70–110)
GLUCOSE BLD STRIP.AUTO-MCNC: 122 MG/DL (ref 70–110)
GLUCOSE BLD STRIP.AUTO-MCNC: 126 MG/DL (ref 70–110)
GLUCOSE BLD STRIP.AUTO-MCNC: 142 MG/DL (ref 70–110)
GLUCOSE BLD STRIP.AUTO-MCNC: 144 MG/DL (ref 70–110)
GLUCOSE BLD STRIP.AUTO-MCNC: 146 MG/DL (ref 70–110)
GLUCOSE BLD STRIP.AUTO-MCNC: 153 MG/DL (ref 70–110)
GLUCOSE BLD STRIP.AUTO-MCNC: 154 MG/DL (ref 70–110)
GLUCOSE BLD STRIP.AUTO-MCNC: 159 MG/DL (ref 70–110)
GLUCOSE BLD STRIP.AUTO-MCNC: 208 MG/DL (ref 70–110)
GLUCOSE BLD STRIP.AUTO-MCNC: 215 MG/DL (ref 70–110)
GLUCOSE BLD STRIP.AUTO-MCNC: 221 MG/DL (ref 70–110)
GLUCOSE BLD STRIP.AUTO-MCNC: 231 MG/DL (ref 70–110)
GLUCOSE BLD STRIP.AUTO-MCNC: 234 MG/DL (ref 70–110)
GLUCOSE BLD STRIP.AUTO-MCNC: 93 MG/DL (ref 70–110)
GLUCOSE BLD STRIP.AUTO-MCNC: 93 MG/DL (ref 70–110)
GLUCOSE SERPL-MCNC: 103 MG/DL (ref 74–99)
GLUCOSE SERPL-MCNC: 140 MG/DL (ref 74–99)
GLUCOSE SERPL-MCNC: 206 MG/DL (ref 74–99)
GLUCOSE SERPL-MCNC: 214 MG/DL (ref 74–99)
GLUCOSE, GLC: 114 MG/DL
GLUCOSE, GLC: 122 MG/DL
GLUCOSE, GLC: 126 MG/DL
GLUCOSE, GLC: 142 MG/DL
GLUCOSE, GLC: 144 MG/DL
GLUCOSE, GLC: 146 MG/DL
GLUCOSE, GLC: 154 MG/DL
GLUCOSE, GLC: 159 MG/DL
GLUCOSE, GLC: 208 MG/DL
GLUCOSE, GLC: 215 MG/DL
GLUCOSE, GLC: 221 MG/DL
GLUCOSE, GLC: 234 MG/DL
GLUCOSE, GLC: 93 MG/DL
GLUCOSE, GLC: 93 MG/DL
HCT VFR BLD AUTO: 41.1 % (ref 36–48)
HGB BLD-MCNC: 14.3 G/DL (ref 13–16)
HIGH TARGET, HITG: 180 MG/DL
INSULIN ADMINSTERED, INSADM: 0 UNITS/HOUR
INSULIN ADMINSTERED, INSADM: 0.3 UNITS/HOUR
INSULIN ADMINSTERED, INSADM: 0.7 UNITS/HOUR
INSULIN ADMINSTERED, INSADM: 1.6 UNITS/HOUR
INSULIN ADMINSTERED, INSADM: 1.6 UNITS/HOUR
INSULIN ADMINSTERED, INSADM: 1.9 UNITS/HOUR
INSULIN ADMINSTERED, INSADM: 3 UNITS/HOUR
INSULIN ADMINSTERED, INSADM: 3.3 UNITS/HOUR
INSULIN ADMINSTERED, INSADM: 3.4 UNITS/HOUR
INSULIN ADMINSTERED, INSADM: 3.8 UNITS/HOUR
INSULIN ADMINSTERED, INSADM: 5.2 UNITS/HOUR
INSULIN ADMINSTERED, INSADM: 6.2 UNITS/HOUR
INSULIN ORDER, INSORD: 0 UNITS/HOUR
INSULIN ORDER, INSORD: 0.3 UNITS/HOUR
INSULIN ORDER, INSORD: 0.7 UNITS/HOUR
INSULIN ORDER, INSORD: 1.6 UNITS/HOUR
INSULIN ORDER, INSORD: 1.6 UNITS/HOUR
INSULIN ORDER, INSORD: 1.9 UNITS/HOUR
INSULIN ORDER, INSORD: 3 UNITS/HOUR
INSULIN ORDER, INSORD: 3.3 UNITS/HOUR
INSULIN ORDER, INSORD: 3.4 UNITS/HOUR
INSULIN ORDER, INSORD: 3.8 UNITS/HOUR
INSULIN ORDER, INSORD: 5.2 UNITS/HOUR
INSULIN ORDER, INSORD: 6.2 UNITS/HOUR
LACTATE SERPL-SCNC: 0.6 MMOL/L (ref 0.4–2)
LOW TARGET, LOT: 140 MG/DL
LYMPHOCYTES # BLD: 1.9 K/UL (ref 0.9–3.6)
LYMPHOCYTES NFR BLD: 14 % (ref 21–52)
MAGNESIUM SERPL-MCNC: 1.8 MG/DL (ref 1.6–2.6)
MAGNESIUM SERPL-MCNC: 1.9 MG/DL (ref 1.6–2.6)
MAGNESIUM SERPL-MCNC: 2.1 MG/DL (ref 1.6–2.6)
MAGNESIUM SERPL-MCNC: 2.1 MG/DL (ref 1.6–2.6)
MCH RBC QN AUTO: 29.5 PG (ref 24–34)
MCHC RBC AUTO-ENTMCNC: 34.8 G/DL (ref 31–37)
MCV RBC AUTO: 84.7 FL (ref 74–97)
MINUTES UNTIL NEXT BG, NBG: 60 MIN
MONOCYTES # BLD: 1.3 K/UL (ref 0.05–1.2)
MONOCYTES NFR BLD: 10 % (ref 3–10)
MULTIPLIER, MUL: 0
MULTIPLIER, MUL: 0.01
MULTIPLIER, MUL: 0.01
MULTIPLIER, MUL: 0.02
MULTIPLIER, MUL: 0.02
MULTIPLIER, MUL: 0.03
MULTIPLIER, MUL: 0.04
NEUTS SEG # BLD: 10.2 K/UL (ref 1.8–8)
NEUTS SEG NFR BLD: 76 % (ref 40–73)
ORDER INITIALS, ORDINIT: NORMAL
PLATELET # BLD AUTO: 169 K/UL (ref 135–420)
PMV BLD AUTO: 9.8 FL (ref 9.2–11.8)
POTASSIUM SERPL-SCNC: 3.6 MMOL/L (ref 3.5–5.5)
POTASSIUM SERPL-SCNC: 3.7 MMOL/L (ref 3.5–5.5)
POTASSIUM SERPL-SCNC: 3.7 MMOL/L (ref 3.5–5.5)
POTASSIUM SERPL-SCNC: 4.2 MMOL/L (ref 3.5–5.5)
PROT SERPL-MCNC: 5.3 G/DL (ref 6.4–8.2)
RBC # BLD AUTO: 4.85 M/UL (ref 4.7–5.5)
SODIUM SERPL-SCNC: 140 MMOL/L (ref 136–145)
SODIUM SERPL-SCNC: 141 MMOL/L (ref 136–145)
SODIUM SERPL-SCNC: 142 MMOL/L (ref 136–145)
SODIUM SERPL-SCNC: 143 MMOL/L (ref 136–145)
WBC # BLD AUTO: 13.4 K/UL (ref 4.6–13.2)

## 2020-04-07 PROCEDURE — 74011250636 HC RX REV CODE- 250/636: Performed by: INTERNAL MEDICINE

## 2020-04-07 PROCEDURE — 83605 ASSAY OF LACTIC ACID: CPT

## 2020-04-07 PROCEDURE — 36415 COLL VENOUS BLD VENIPUNCTURE: CPT

## 2020-04-07 PROCEDURE — C9113 INJ PANTOPRAZOLE SODIUM, VIA: HCPCS | Performed by: INTERNAL MEDICINE

## 2020-04-07 PROCEDURE — 85025 COMPLETE CBC W/AUTO DIFF WBC: CPT

## 2020-04-07 PROCEDURE — 74011250637 HC RX REV CODE- 250/637: Performed by: INTERNAL MEDICINE

## 2020-04-07 PROCEDURE — 83735 ASSAY OF MAGNESIUM: CPT

## 2020-04-07 PROCEDURE — 82962 GLUCOSE BLOOD TEST: CPT

## 2020-04-07 PROCEDURE — 80053 COMPREHEN METABOLIC PANEL: CPT

## 2020-04-07 PROCEDURE — 65270000029 HC RM PRIVATE

## 2020-04-07 PROCEDURE — 80048 BASIC METABOLIC PNL TOTAL CA: CPT

## 2020-04-07 PROCEDURE — 74011000258 HC RX REV CODE- 258: Performed by: INTERNAL MEDICINE

## 2020-04-07 PROCEDURE — 74011636637 HC RX REV CODE- 636/637: Performed by: INTERNAL MEDICINE

## 2020-04-07 RX ORDER — INSULIN LISPRO 100 [IU]/ML
0.05 INJECTION, SOLUTION INTRAVENOUS; SUBCUTANEOUS
Status: DISCONTINUED | OUTPATIENT
Start: 2020-04-07 | End: 2020-04-08 | Stop reason: ALTCHOICE

## 2020-04-07 RX ORDER — INSULIN GLARGINE 100 [IU]/ML
20 INJECTION, SOLUTION SUBCUTANEOUS DAILY
Status: DISCONTINUED | OUTPATIENT
Start: 2020-04-07 | End: 2020-04-07

## 2020-04-07 RX ORDER — INSULIN LISPRO 100 [IU]/ML
INJECTION, SOLUTION INTRAVENOUS; SUBCUTANEOUS
Status: DISCONTINUED | OUTPATIENT
Start: 2020-04-07 | End: 2020-04-08 | Stop reason: ALTCHOICE

## 2020-04-07 RX ORDER — DEXTROSE MONOHYDRATE 100 MG/ML
125-250 INJECTION, SOLUTION INTRAVENOUS AS NEEDED
Status: DISCONTINUED | OUTPATIENT
Start: 2020-04-07 | End: 2020-04-08 | Stop reason: SDUPTHER

## 2020-04-07 RX ORDER — CALCIUM CARBONATE 200(500)MG
200 TABLET,CHEWABLE ORAL 2 TIMES DAILY WITH MEALS
Status: DISCONTINUED | OUTPATIENT
Start: 2020-04-07 | End: 2020-04-08 | Stop reason: HOSPADM

## 2020-04-07 RX ORDER — DEXTROSE MONOHYDRATE 100 MG/ML
125-250 INJECTION, SOLUTION INTRAVENOUS AS NEEDED
Status: DISCONTINUED | OUTPATIENT
Start: 2020-04-07 | End: 2020-04-08 | Stop reason: HOSPADM

## 2020-04-07 RX ORDER — INSULIN GLARGINE 100 [IU]/ML
15 INJECTION, SOLUTION SUBCUTANEOUS DAILY
Status: DISCONTINUED | OUTPATIENT
Start: 2020-04-07 | End: 2020-04-08 | Stop reason: ALTCHOICE

## 2020-04-07 RX ADMIN — Medication 10 ML: at 06:01

## 2020-04-07 RX ADMIN — HEPARIN SODIUM 5000 UNITS: 5000 INJECTION INTRAVENOUS; SUBCUTANEOUS at 17:02

## 2020-04-07 RX ADMIN — INSULIN LISPRO 1 UNITS: 100 INJECTION, SOLUTION INTRAVENOUS; SUBCUTANEOUS at 17:01

## 2020-04-07 RX ADMIN — DEXTROSE MONOHYDRATE AND SODIUM CHLORIDE 150 ML/HR: 5; .9 INJECTION, SOLUTION INTRAVENOUS at 06:11

## 2020-04-07 RX ADMIN — Medication 10 ML: at 23:47

## 2020-04-07 RX ADMIN — INSULIN GLARGINE 15 UNITS: 100 INJECTION, SOLUTION SUBCUTANEOUS at 12:08

## 2020-04-07 RX ADMIN — LORAZEPAM 1 MG: 1 TABLET ORAL at 12:08

## 2020-04-07 RX ADMIN — Medication 10 ML: at 23:46

## 2020-04-07 RX ADMIN — INSULIN LISPRO 1 UNITS: 100 INJECTION, SOLUTION INTRAVENOUS; SUBCUTANEOUS at 22:46

## 2020-04-07 RX ADMIN — DEXTROSE MONOHYDRATE AND SODIUM CHLORIDE 150 ML/HR: 5; .9 INJECTION, SOLUTION INTRAVENOUS at 00:24

## 2020-04-07 RX ADMIN — HEPARIN SODIUM 5000 UNITS: 5000 INJECTION INTRAVENOUS; SUBCUTANEOUS at 03:16

## 2020-04-07 RX ADMIN — ANTACID TABLETS 200 MG: 500 TABLET, CHEWABLE ORAL at 10:12

## 2020-04-07 RX ADMIN — ANTACID TABLETS 200 MG: 500 TABLET, CHEWABLE ORAL at 17:02

## 2020-04-07 RX ADMIN — ONDANSETRON 4 MG: 2 INJECTION INTRAMUSCULAR; INTRAVENOUS at 11:25

## 2020-04-07 RX ADMIN — MAGNESIUM HYDROXIDE 30 ML: 400 SUSPENSION ORAL at 23:46

## 2020-04-07 RX ADMIN — HEPARIN SODIUM 5000 UNITS: 5000 INJECTION INTRAVENOUS; SUBCUTANEOUS at 09:04

## 2020-04-07 RX ADMIN — PANTOPRAZOLE SODIUM 40 MG: 40 INJECTION, POWDER, FOR SOLUTION INTRAVENOUS at 09:03

## 2020-04-07 NOTE — PROGRESS NOTES
Assumed care of patient. Patient is alert and oriented x 4. Patient is calm and cooperative. Patient denies numbness or tingling to all extermities. Lung sounds clear bilaterally. Respirations even and unlabored. no use of accessory muscles. Abdomen is soft and non-tender. Bowel sounds active to x4 quadrants. . No bladder distention evident. No complaints of bladder discomfort. Skin is warm , dry and skin color is appropriate to race. 20G IV intact to R AC and infusing without difficulty. Reports pain 0/10. Call bell within reach. Bed in low position. Three side rails up. 1630-Pt     Shift Summary-  Pt is alert and oriented x 4. Pt had uneventful shift. Pt ambulating and voiding sufficient amounts. Pt has not had any PRN pain medication. Pt is on CIWA scale. Last time it was done was 1642 with a score of 0. Pt is Q4 BS. Pt last BS was at 1630 with a BS of 153.

## 2020-04-07 NOTE — DIABETES MGMT
GLYCEMIC CONTROL PROGRESS NOTE:    - consult received r/t DKA & pt on Glucostabilizer insulin gtt known h/o T1DM, last DKA admission in March 2019  - pt DOES MEET CRITERIA AT THIS TIME to transition to basal/bolus sub-q  - need two CO2 wnl x 2 lab draws (met)  - need Anion gap <12 x 2 lab draws (met)  - need drip rate multiplier to be 0.02 and steady or decreasing x 4 hours (met)   - need BG to within target range x 4 hours (met)   - last Endo visit January 2020, per Endo note pt runs high BG r/t fear of hypoglycemia, office is attempting to set patient up with training on insulin pump including CGM closed loop system  - this writer reviewed insulin pump settings with patients, pt does not have supplies, will attempt to have roommate bring supplies in to restart insulin pump tomorrow     - the following orders were entered with permission from Dr. Raquel Garner   *Lantus 15 units daily   *Humalog 2 units qac if pt eats < 50% of meal    *Humalog 4 units qac if pt eats > 50% of meal    Insulin Pump Settings    12AM- 9AM 1.0 units /hour; 9AM-1PM 1.5 units/hour; 1PM-12AM 1.5 units /hour  Basal 30.7 units in 24 hours  ICR 1:6 ISF 60 mg/dL             - GC will continue to follow and make recommendations as needed    Recent Glucose Results:   Lab Results   Component Value Date/Time     (H) 04/07/2020 10:15 AM     (H) 04/07/2020 06:13 AM     (H) 04/07/2020 02:00 AM    GLUCPOC 142 (H) 04/07/2020 12:05 PM    GLUCPOC 154 (H) 04/07/2020 11:13 AM    GLUCPOC 215 (H) 04/07/2020 10:08 AM       Stacy Conrad MS, RN, CDE  Glycemic Control Team  [phone]  Pager [phone] (M-TH 8:00-4:30P)  *After Hours pager [phone]

## 2020-04-07 NOTE — PROGRESS NOTES
0730 Report from [address], patient is in DKA and is on insulin drip, monitoring blood sugar hourly, glucostabilizer  0800 Assessment complete, patient complaining of mild epigastric pain, denies nausea/vomiting, NAD, VSS  1200 Reassessment, basal dose of Lantus given, will stop insulin drip in 2 hours per protocol  1400 Insulin dripp discontinued  1545 Telephone report given to [employer] on [address], patient will transfer up for continuation of care

## 2020-04-07 NOTE — PROGRESS NOTES
1900 Bedside and Verbal shift change report given to [address]en [address] (oncoming nurse) by [facility] (offgoing nurse). Report included the following information SBAR, Kardex, ED Summary, Intake/Output, MAR, Recent Results and Cardiac Rhythm ST.       1945 patient put back on insulin drip per gluco stabilizer    2000 patient stated nausea is a little better. 0450 pt sleeping well in between care     0724 Bedside and Verbal shift change report given to [facility] RN (oncoming nurse) by Rashaad Sanches RN (offgoing nurse). Report included the following information SBAR, Kardex, ED Summary, Intake/Output, MAR and Cardiac Rhythm ST/SR.

## 2020-04-07 NOTE — ROUTINE PROCESS
TRANSFER - IN REPORT: 
 
Verbal report received from Cezar RN(name) on Lucius Lin  being received from [facility]) for routine post - op Report consisted of patients Situation, Background, Assessment and  
Recommendations(SBAR). Information from the following report(s) SBAR, Kardex, [facility] and Recent Results was reviewed with the receiving nurse. Opportunity for questions and clarification was provided. Assessment completed upon patients arrival to unit and care assumed.

## 2020-04-07 NOTE — PROGRESS NOTES
Hospitalist Progress Note    Patient: Osmar Horn MRN: [id number]  CSN: [id number]    YOB: 1995  Age: 21 y.o. Sex: male    DOA: 4/6/2020 LOS:  LOS: 1 day                Assessment/Plan     Patient Active Problem List   Diagnosis Code    Type I diabetes mellitus ([facility] E10.9    DON (acute kidney injury) ([facility] N17.9    DKA, type 1 ([facility] L86.05    Metabolic acidosis L35.3    Leukocytosis D72.829    Alcohol abuse F10.10            22 yo male admitted for DKA. DKA -   Resolved, stopped insulin drip, transition to basal and sliding scale insulin. Leukocytosis-  Reactive, improving. No evidence of infection. Alcohol abuse-  On CIWA protocol and banana bag. DVT prophylaxis with heparin, GI prophylaxis with Protonix. Transfer to medical floor. Disposition : 1-2 days    Review of systems  General: No fevers or chills. Cardiovascular: No chest pain or pressure. No palpitations. Pulmonary: No shortness of breath. Gastrointestinal: No nausea, vomiting. Physical Exam:  General: Awake, cooperative, no acute distress    HEENT: NC, Atraumatic. PERRLA, anicteric sclerae. Lungs: CTA Bilaterally. No Wheezing/Rhonchi/Rales. Heart:  S1 S2,  No murmur, No Rubs, No Gallops  Abdomen: Soft, Non distended, Non tender.  +Bowel sounds,   Extremities: No c/c/e  Psych:   Not anxious or agitated. Neurologic:  No acute neurological deficit.            Vital signs/Intake and Output:  Visit Vitals  /82   Pulse 95   Temp 97.7 °F (36.5 °C)   Resp 19   Ht 5' 11\" (1.803 m)   Wt 77.1 kg (170 lb)   SpO2 96%   BMI 23.71 kg/m²     Current Shift:  04/07 0701 - 04/07 1900  In: 769.9 [I.V.:769.9]  Out: -   Last three shifts:  04/05 1901 - 04/07 0700  In: 5507.2 [I.V.:5507.2]  Out: 0             Labs: Results:       Chemistry Recent Labs     04/07/20  1350 04/07/20  1015 04/07/20  0613 04/07/20  0200  04/06/20  0956 04/06/20  0600   * 214* 206* 103*   < > 356* 784*    140 142 143 < > 139 132*   K 3.6 3.7 4.2 3.7   < > 4.3 4.7    110 110 110   < > 106 91*   CO2 25 26 24 26   < > 18* 12*   BUN 12 13 15 17   < > 26* 29*   CREA 0.82 1.06 1.04 1.04   < > 1.54* 1.89*   CA 8.4* 8.1* 8.6 8.4*   < > 9.6 10.9*   AGAP 6 4 8 7   < > 15 29*   BUCR 15 12 14 16   < > 17 15   AP  --   --   --  88  --  117 141*   TP  --   --   --  5.3*  --  6.6 7.7   ALB  --   --   --  2.8*  --  3.5 4.2   GLOB  --   --   --  2.5  --  3.1 3.5   AGRAT  --   --   --  1.1  --  1.1 1.2    < > = values in this interval not displayed. CBC w/Diff Recent Labs     04/07/20  0200 04/06/20  0600   WBC 13.4* 23.8*   RBC 4.85 5.47   HGB 14.3 16.3*   HCT 41.1 46.6    280   GRANS 76* 90*   LYMPH 14* 4*   EOS 0 0      Cardiac Enzymes No results for input(s): CPK, CKND1, CHRISTAL in the last 72 hours. No lab exists for component: CKRMB, TROIP   Coagulation No results for input(s): PTP, INR, APTT, INREXT, INREXT in the last 72 hours. Lipid Panel No results found for: CHOL, CHOLPOCT, CHOLX, CHLST, CHOLV, 345571, HDL, HDLP, LDL, LDLC, DLDLP, 409184, VLDLC, VLDL, TGLX, TRIGL, TRIGP, TGLPOCT, CHHD, CHHDX   BNP No results for input(s): BNPP in the last 72 hours.    Liver Enzymes Recent Labs     04/07/20  0200   TP 5.3*   ALB 2.8*   AP 88   SGOT 17      Thyroid Studies No results found for: T4, T3U, TSH, TSHEXT, TSHEXT     Procedures/imaging: see electronic medical records for all procedures/Xrays and details which were not copied into this note but were reviewed prior to creation of Plan

## 2020-04-07 NOTE — PROGRESS NOTES
[facility]  Pulmonary, Critical Care, and Sleep Medicine    Name: Britney Ray MRN: [id number]   : 1995 Hospital: [facility] [place]    Date: 2020  Room: 101/[address] [place] [address] Note                                              Consult requesting physician: Dr. Gabriel Riojas  Reason for Consult: DKA      IMPRESSION:   DKA (diabetic ketoacidoses) ([facility] E11.10     Alcohol abuse F10.10 ·     ·   Patient Active Problem List   Diagnosis Code    DKA (diabetic ketoacidoses) ([facility] E11.10    Renal insufficiency N28.9    Type I diabetes mellitus ([facility] E10.9    DON [facility]acute [facility] injury) [facility] N17.9    Dehydration E86.0    DKA, type 1 ([facility] Z67.42    Metabolic acidosis M03.3    Leukocytosis D72.829    SIRS (systemic inflammatory response syndrome) ([facility] R65.10    Alcohol abuse F10.10     · Code status: full      RECOMMENDATIONS:   Respiratory:   No acute issues. Protecting airway. Keep SPO2 >=92%. HOB 30 degree elevation all the time. Aggressive pulmonary toileting. Aspiration precautions. Incentive spirometry. CVS: BP stable, monitor hemodynamics   ID: leucocytosis, but no fever. Leucocytosis improving. No source of infection. Likely due to stress. Monitor off abx. Hematology/Oncology: no acute issuees  Renal: mild DON, improved. c/w IVF per insulin drip protocol, and monitor UOP and creat. GI/: nausea and vomiting resolved. Was likely due to DKA. Mild epigastric pain, likely due to gastritis. C/w protonix and start tums. Endocrine: Monitor BS. C/w insulin drip protocol, anion gap resolved. To transition insulin drip to s/q today. Neurology: no acute issues  Toxicology: alcohol abuse, drinks 8-12 beer 2-3 times/week. . Alcohol level <3 on admission. Tachycardia likely due to DKA, improving. Monitor for alcohol withdrawal. C/w banana bag, CIWA scoring. No withdrawal sx.    Pain/Sedation: no acute issues  Skin/Wound: no issues  Electrolytes: Replace electrolytes per ICU electrolyte replacement protocol. IVF: per insulin drip protocol  Nutrition: npo while on insulin drip protocol  Prophylaxis: DVT Prophylaxis: SCD/heparin. GI Prophylaxis: Protonix/protonix. Restraints: none  Lines/Tubes: PIV    Will defer respective systems problem management to primary and other respective consultant and follow patient in ICU with primary and other medical team.  Further recommendations will be based on the patient's response to recommended treatment and results of the investigation ordered. Quality Care: PPI, DVT prophylaxis, HOB elevated, Infection control all reviewed and addressed. Care of plan d/w RN, RT, MDR, hospitalist team.   D/w patient (answered all questions to satisfaction). High complexity decision making was performed during the evaluation of this patient at high risk for decompensation with multiple organ involvement. Total critical care time spent rendering care exclusive of procedures: 31 minutes. Subjective/History of Present Illness:     Patient is a 21 y.o. male with PMHx significant for IDDM, alcohol abuse admitted with DKA, DON, leucocytosis and lactic acidosis. Pt was drinking alcohol on 4/4/20 night. On 4/5/20 he developed nausea and vomiting with hyperglycemia. He self treated with phenergan and zofran and insulin. He thinks that his insulin pump was dislodged on prior night. Came to ER on 4/6/20 and found to have DKA with high AG, admitted on insulin drip. Since then feeling better. No more N V.     4/7/2020:  Remained in icu  Still on insulin drip  AG closed. Nausea +. No vomiting. Epigastric pain +. Wbc improving. No fever. No fever chills cough cp dyspnea wheezing. No direct contact to COVID 19 patients or recent travel. [facility] was not called by ICU staff or any other physician about this patient for any issues last night. No other overnight issues reported    I/O last 24 hrs:      Intake/Output Summary (Last 24 hours) at 4/7/2020 0948  Last data filed at 4/7/2020 0900  Gross per 24 hour   Intake 3798.73 ml   Output 0 ml   Net 3798.73 ml       History taken from patient, EMR     Review of Systems:   HEENT: No epistaxis, no nasal drainage, no difficulty in swallowing, no redness in eyes  Respiratory: as above  Cardiovascular: no chest pain, no palpitations, no chronic leg edema, no syncope  Gastrointestinal: no abd pain, no bleeding symptoms  Genitourinary: No urinary symptoms or hematuria  Musculoskeletal: Neg  Neurological: No focal weakness, no seizures, no headaches  Behvioral/Psych: No anxiety, no depression  Constitutional: No fever, no chills, no weight loss, no night sweats     No Known Allergies   Past Medical History:   Diagnosis Date    ADD (attention deficit disorder)     Diabetes ([facility]     Insomnia     Seizure ([facility]       History reviewed. No pertinent surgical history. Social History     Tobacco Use    Smoking status: Never Smoker    Smokeless tobacco: Former User   Substance Use Topics    Alcohol use: Yes     Alcohol/week: 16.0 standard drinks     Types: 10 Cans of beer, 6 Shots of liquor per week      History reviewed. No pertinent family history. Prior to Admission medications    Medication Sig Start Date End Date Taking? Authorizing Provider   ondansetron hcl (Zofran) 4 mg tablet Take 4 mg by mouth every eight (8) hours as needed for Nausea or Nausea or Vomiting. Yes Provider, Historical   insulin syringe-needle U-100 (BD INSULIN SYRINGE SAFETYGLIDE) 1 mL 29 gauge x 1/2\" syrg 1 Syringe by Does Not Apply route Before breakfast, lunch, and dinner. 4/4/19  Yes Tameka Donnelly PA   lancets-blood glucose strips 30 gauge cmpk 1 Each by Does Not Apply route four (4) times daily. 4/3/19  Yes Mandi Norton PA-C   insulin lispro (HUMALOG U-100 INSULIN) 100 unit/mL injection Use as directed with insulin pump 4/3/19  Yes Mandi Norton PA-C   fluoxetine HCl (PROZAC PO) Take 20 mg by mouth daily.    Yes Brittni Pike MD   AMPHET ASP/AMPHET/D-AMPHET (ADDERALL PO) Take 30 mg by mouth two (2) times a day. Yes Brittni Pike MD    insulin pump (PATIENT SUPPLIED) Misc by SubCUTAneous route as needed. Yes Brittni Pike MD   ibuprofen (MOTRIN) 600 mg tablet Take 1 Tab by mouth every six (6) hours as needed for Pain. 20   NONA Bella   methocarbamol (ROBAXIN-750) 750 mg tablet Take 1 Tab by mouth four (4) times daily. 20   Luke Donnelly PA   Blood-Glucose Meter monitoring kit Use as directed. 4/3/19   Nicolle Branch PA-C   RANITIDINE HCL PO Take  by mouth. Brittni Pike MD   promethazine HCl (PROMETHAZINE PO) Take  by mouth. Brittni Pike MD     Current Facility-Administered Medications   Medication Dose Route Frequency    insulin regular (NOVOLIN R, HUMULIN R) 100 Units in 0.9% sodium chloride 100 mL infusion  0-50 Units/hr IntraVENous TITRATE    sodium chloride (NS) flush 5-40 mL  5-40 mL IntraVENous Q8H    sodium bicarbonate (8.4%) 50 mEq in 0.45% sodium chloride 1,000 mL infusion   IntraVENous CONTINUOUS    pantoprazole (PROTONIX) injection 40 mg  40 mg IntraVENous DAILY    heparin (porcine) injection 5,000 Units  5,000 Units SubCUTAneous Q8H    0.9% sodium chloride 1,000 mL with mvi, adult no. 4 with vit K 10 mL, thiamine 089 mg, folic acid 1 mg infusion   IntraVENous DAILY    sodium chloride (NS) flush 5-40 mL  5-40 mL IntraVENous Q8H    influenza vaccine - (6 mos+)(PF) (FLUARIX/FLULAVAL/FLUZONE QUAD) injection 0.5 mL  0.5 mL IntraMUSCular PRIOR TO DISCHARGE    dextrose 5% and 0.9% NaCl infusion  150 mL/hr IntraVENous CONTINUOUS         Objective:   Vital Signs:    Visit Vitals  /83   Pulse 97   Temp 98.1 °F (36.7 °C)   Resp 16   Ht 5' 11\" (1.803 m)   Wt 77.1 kg (170 lb)   SpO2 98%   BMI 23.71 kg/m²       O2 Device: Room air       Temp (24hrs), Av.9 °F (36.6 °C), Min:97.7 °F (36.5 °C), Max:98.4 °F (36.9 °C)       Intake/Output:   Last shift:      701 - 04/07 1900  In: 304.3 [I.V.:304.3]  Out: -     Last 3 shifts: 04/05 1901 - 04/07 0700  In: 5507.2 [I.V.:5507.2]  Out: 0       Intake/Output Summary (Last 24 hours) at 4/7/2020 0948  Last data filed at 4/7/2020 0900  Gross per 24 hour   Intake 3798.73 ml   Output 0 ml   Net 3798.73 ml       Last 3 Recorded Weights in this Encounter    04/06/20 0557   Weight: 77.1 kg (170 lb)             Recent Labs     04/06/20  0643   PHI 7.287*   PCO2I 23.2*   PO2I 117*   HCO3I 11.1*   FIO2I 0.21       Physical Exam:     General/Neurology: Alert, Awake, NAD. Head:   Normocephalic, without obvious abnormality, atraumatic. Eye:   EOM intact, no scleral icterus, no pallor, no cyanosis. Throat:  Lips, mucosa, and tongue normal. No oral thrush. Neck:   Supple, symmetric. No lymphadenopathy. Trachea midline  Lung: Moderate air entry bilateral equal. No rales. No rhonchi. No wheezing. No stridors. No prolongded expiration. No accessory muscle use. Heart:   Regular rate & rhythm. S1 S2 present. No murmur. No JVD. Abdomen:  Soft. ND. +BS. No masses. Mild epigastric tenderness+  Extremities:  No pedal edema. No cyanosis. No clubbing. Pulses: 2+ and symmetric in DP. Capillary refill: normal  Lymphatic:  No cervical or supraclavicular palpable lymphadenopathy. Skin:   Color, texture, turgor normal. No rashes or lesions.        Data:       Recent Results (from the past 24 hour(s))   MAGNESIUM    Collection Time: 04/06/20  9:56 AM   Result Value Ref Range    Magnesium 2.6 1.6 - 2.6 mg/dL   PHOSPHORUS    Collection Time: 04/06/20  9:56 AM   Result Value Ref Range    Phosphorus 2.7 2.5 - 4.9 MG/DL   METABOLIC PANEL, COMPREHENSIVE    Collection Time: 04/06/20  9:56 AM   Result Value Ref Range    Sodium 139 136 - 145 mmol/L    Potassium 4.3 3.5 - 5.5 mmol/L    Chloride 106 100 - 111 mmol/L    CO2 18 (L) 21 - 32 mmol/L    Anion gap 15 3.0 - 18 mmol/L    Glucose 356 (H) 74 - 99 mg/dL    BUN 26 (H) 7.0 - 18 MG/DL    Creatinine 1.54 (H) 0.6 - 1.3 MG/DL    BUN/Creatinine ratio 17 12 - 20      GFR est AA >60 >60 ml/min/1.73m2    GFR est non-AA 55 (L) >60 ml/min/1.73m2    Calcium 9.6 8.5 - 10.1 MG/DL    Bilirubin, total 2.4 (H) 0.2 - 1.0 MG/DL    ALT (SGPT) 23 16 - 61 U/L    AST (SGOT) 14 10 - 38 U/L    Alk.  phosphatase 117 45 - 117 U/L    Protein, total 6.6 6.4 - 8.2 g/dL    Albumin 3.5 3.4 - 5.0 g/dL    Globulin 3.1 2.0 - 4.0 g/dL    A-G Ratio 1.1 0.8 - 1.7     GLUCOSE, POC    Collection Time: 04/06/20 10:02 AM   Result Value Ref Range    Glucose (POC) 362 (H) 70 - 110 mg/dL   GLUCOSTABILIZER    Collection Time: 04/06/20 10:03 AM   Result Value Ref Range    Glucose 362 mg/dL    Insulin order 3.0 units/hour    Insulin adminstered 3.0 units/hour    Multiplier 0.010     Low target 140 mg/dL    High target 180 mg/dL    D50 order 0.0 ml    D50 administered 0.00 ml    Minutes until next BG 60 min    Order initials ab     Administered initials ab    GLUCOSE, POC    Collection Time: 04/06/20 11:10 AM   Result Value Ref Range    Glucose (POC) 263 (H) 70 - 110 mg/dL   GLUCOSTABILIZER    Collection Time: 04/06/20 11:24 AM   Result Value Ref Range    Glucose 263 mg/dL    Insulin order 2.0 units/hour    Insulin adminstered 2.0 units/hour    Multiplier 0.010     Low target 140 mg/dL    High target 180 mg/dL    D50 order 0.0 ml    D50 administered 0.00 ml    Minutes until next BG 60 min    Order initials ab     Administered initials ab    GLUCOSE, POC    Collection Time: 04/06/20 12:27 PM   Result Value Ref Range    Glucose (POC) 249 (H) 70 - 110 mg/dL   GLUCOSTABILIZER    Collection Time: 04/06/20 12:31 PM   Result Value Ref Range    Glucose 249 mg/dL    Insulin order 3.8 units/hour    Insulin adminstered 3.8 units/hour    Multiplier 0.020     Low target 140 mg/dL    High target 180 mg/dL    D50 order 0.0 ml    D50 administered 0.00 ml    Minutes until next BG 60 min    Order initials ab     Administered initials ab    GLUCOSE, POC    Collection Time: 04/06/20 1:45 PM   Result Value Ref Range    Glucose (POC) 238 (H) 70 - 110 mg/dL   GLUCOSTABILIZER    Collection Time: 04/06/20  1:46 PM   Result Value Ref Range    Glucose 238 mg/dL    Insulin order 5.3 units/hour    Insulin adminstered 5.3 units/hour    Multiplier 0.030     Low target 140 mg/dL    High target 180 mg/dL    D50 order 0.0 ml    D50 administered 0.00 ml    Minutes until next BG 60 min    Order initials ab     Administered initials ab    GLUCOSE, POC    Collection Time: 04/06/20  3:29 PM   Result Value Ref Range    Glucose (POC) 147 (H) 70 - 110 mg/dL   GLUCOSTABILIZER    Collection Time: 04/06/20  3:29 PM   Result Value Ref Range    Glucose 147 mg/dL    Insulin order 1.7 units/hour    Insulin adminstered 1.7 units/hour    Multiplier 0.020     Low target 140 mg/dL    High target 180 mg/dL    D50 order 0.0 ml    D50 administered 0.00 ml    Minutes until next BG 60 min    Order initials AW     Administered initials AW    MAGNESIUM    Collection Time: 04/06/20  3:50 PM   Result Value Ref Range    Magnesium 2.4 1.6 - 2.6 mg/dL   METABOLIC PANEL, BASIC    Collection Time: 04/06/20  3:50 PM   Result Value Ref Range    Sodium 142 136 - 145 mmol/L    Potassium 4.0 3.5 - 5.5 mmol/L    Chloride 111 100 - 111 mmol/L    CO2 23 21 - 32 mmol/L    Anion gap 8 3.0 - 18 mmol/L    Glucose 144 (H) 74 - 99 mg/dL    BUN 24 (H) 7.0 - 18 MG/DL    Creatinine 1.16 0.6 - 1.3 MG/DL    BUN/Creatinine ratio 21 (H) 12 - 20      GFR est AA >60 >60 ml/min/1.73m2    GFR est non-AA >60 >60 ml/min/1.73m2    Calcium 8.8 8.5 - 10.1 MG/DL   GLUCOSE, POC    Collection Time: 04/06/20  4:30 PM   Result Value Ref Range    Glucose (POC) 119 (H) 70 - 110 mg/dL   GLUCOSTABILIZER    Collection Time: 04/06/20  4:32 PM   Result Value Ref Range    Glucose 119 mg/dL    Insulin order 0.6 units/hour    Insulin adminstered 0.6 units/hour    Multiplier 0.010     Low target 140 mg/dL    High target 180 mg/dL    D50 order 0.0 ml    D50 administered 0.00 ml Minutes until next BG 60 min    Order initials ab     Administered initials ab    GLUCOSE, POC    Collection Time: 04/06/20  5:34 PM   Result Value Ref Range    Glucose (POC) 114 (H) 70 - 110 mg/dL   GLUCOSTABILIZER    Collection Time: 04/06/20  5:35 PM   Result Value Ref Range    Glucose 114 mg/dL    Insulin order 0.0 units/hour    Insulin adminstered 0.0 units/hour    Multiplier 0.000     Low target 140 mg/dL    High target 180 mg/dL    D50 order 0.0 ml    D50 administered 0.00 ml    Minutes until next BG 60 min    Order initials yh     Administered initials yh    GLUCOSE, POC    Collection Time: 04/06/20  6:42 PM   Result Value Ref Range    Glucose (POC) 180 (H) 70 - 110 mg/dL   GLUCOSTABILIZER    Collection Time: 04/06/20  6:43 PM   Result Value Ref Range    Glucose 180 mg/dL    Insulin order 0.0 units/hour    Insulin adminstered 0.0 units/hour    Multiplier 0.000     Low target 140 mg/dL    High target 180 mg/dL    D50 order 0.0 ml    D50 administered 0.00 ml    Minutes until next BG 60 min    Order initials AW     Administered initials AW    GLUCOSE, POC    Collection Time: 04/06/20  7:45 PM   Result Value Ref Range    Glucose (POC) 263 (H) 70 - 110 mg/dL   GLUCOSTABILIZER    Collection Time: 04/06/20  7:50 PM   Result Value Ref Range    Glucose 263 mg/dL    Insulin order 2.0 units/hour    Insulin adminstered 2.0 units/hour    Multiplier 0.010     Low target 140 mg/dL    High target 180 mg/dL    D50 order 0.0 ml    D50 administered 0.00 ml    Minutes until next BG 60 min    Order initials sc     Administered initials sc    GLUCOSE, POC    Collection Time: 04/06/20  8:52 PM   Result Value Ref Range    Glucose (POC) 310 (H) 70 - 110 mg/dL   GLUCOSTABILIZER    Collection Time: 04/06/20  8:57 PM   Result Value Ref Range    Glucose 310 mg/dL    Insulin order 5.0 units/hour    Insulin adminstered 5.0 units/hour    Multiplier 0.020     Low target 140 mg/dL    High target 180 mg/dL    D50 order 0.0 ml    D50 administered 0.00 ml    Minutes until next BG 60 min    Order initials sc     Administered initials sc    METABOLIC PANEL, BASIC    Collection Time: 04/06/20  9:55 PM   Result Value Ref Range    Sodium 142 136 - 145 mmol/L    Potassium 3.8 3.5 - 5.5 mmol/L    Chloride 111 100 - 111 mmol/L    CO2 21 21 - 32 mmol/L    Anion gap 10 3.0 - 18 mmol/L    Glucose 258 (H) 74 - 99 mg/dL    BUN 20 (H) 7.0 - 18 MG/DL    Creatinine 1.12 0.6 - 1.3 MG/DL    BUN/Creatinine ratio 18 12 - 20      GFR est AA >60 >60 ml/min/1.73m2    GFR est non-AA >60 >60 ml/min/1.73m2    Calcium 8.3 (L) 8.5 - 10.1 MG/DL   MAGNESIUM    Collection Time: 04/06/20  9:55 PM   Result Value Ref Range    Magnesium 2.2 1.6 - 2.6 mg/dL   GLUCOSE, POC    Collection Time: 04/06/20  9:55 PM   Result Value Ref Range    Glucose (POC) 250 (H) 70 - 110 mg/dL   GLUCOSTABILIZER    Collection Time: 04/06/20  9:58 PM   Result Value Ref Range    Glucose 250 mg/dL    Insulin order 5.7 units/hour    Insulin adminstered 5.7 units/hour    Multiplier 0.030     Low target 140 mg/dL    High target 180 mg/dL    D50 order 0.0 ml    D50 administered 0.00 ml    Minutes until next BG 60 min    Order initials sc     Administered initials sc    GLUCOSE, POC    Collection Time: 04/06/20 10:56 PM   Result Value Ref Range    Glucose (POC) 192 (H) 70 - 110 mg/dL   GLUCOSTABILIZER    Collection Time: 04/06/20 11:01 PM   Result Value Ref Range    Glucose 192 mg/dL    Insulin order 5.3 units/hour    Insulin adminstered 5.3 units/hour    Multiplier 0.040     Low target 140 mg/dL    High target 180 mg/dL    D50 order 0.0 ml    D50 administered 0.00 ml    Minutes until next BG 60 min    Order initials sc     Administered initials sc    GLUCOSE, POC    Collection Time: 04/06/20 11:58 PM   Result Value Ref Range    Glucose (POC) 144 (H) 70 - 110 mg/dL   GLUCOSTABILIZER    Collection Time: 04/07/20 12:03 AM   Result Value Ref Range    Glucose 144 mg/dL    Insulin order 3.4 units/hour    Insulin adminstered 3.4 units/hour    Multiplier 0.040     Low target 140 mg/dL    High target 180 mg/dL    D50 order 0.0 ml    D50 administered 0.00 ml    Minutes until next BG 60 min    Order initials sc     Administered initials sc    GLUCOSE, POC    Collection Time: 04/07/20  1:02 AM   Result Value Ref Range    Glucose (POC) 114 (H) 70 - 110 mg/dL   GLUCOSTABILIZER    Collection Time: 04/07/20  1:04 AM   Result Value Ref Range    Glucose 114 mg/dL    Insulin order 1.6 units/hour    Insulin adminstered 1.6 units/hour    Multiplier 0.030     Low target 140 mg/dL    High target 180 mg/dL    D50 order 0.0 ml    D50 administered 0.00 ml    Minutes until next BG 60 min    Order initials sc     Administered initials sc    MAGNESIUM    Collection Time: 04/07/20  2:00 AM   Result Value Ref Range    Magnesium 2.1 1.6 - 2.6 mg/dL   METABOLIC PANEL, COMPREHENSIVE    Collection Time: 04/07/20  2:00 AM   Result Value Ref Range    Sodium 143 136 - 145 mmol/L    Potassium 3.7 3.5 - 5.5 mmol/L    Chloride 110 100 - 111 mmol/L    CO2 26 21 - 32 mmol/L    Anion gap 7 3.0 - 18 mmol/L    Glucose 103 (H) 74 - 99 mg/dL    BUN 17 7.0 - 18 MG/DL    Creatinine 1.04 0.6 - 1.3 MG/DL    BUN/Creatinine ratio 16 12 - 20      GFR est AA >60 >60 ml/min/1.73m2    GFR est non-AA >60 >60 ml/min/1.73m2    Calcium 8.4 (L) 8.5 - 10.1 MG/DL    Bilirubin, total 2.1 (H) 0.2 - 1.0 MG/DL    ALT (SGPT) 17 16 - 61 U/L    AST (SGOT) 17 10 - 38 U/L    Alk.  phosphatase 88 45 - 117 U/L    Protein, total 5.3 (L) 6.4 - 8.2 g/dL    Albumin 2.8 (L) 3.4 - 5.0 g/dL    Globulin 2.5 2.0 - 4.0 g/dL    A-G Ratio 1.1 0.8 - 1.7     CBC WITH AUTOMATED DIFF    Collection Time: 04/07/20  2:00 AM   Result Value Ref Range    WBC 13.4 (H) 4.6 - 13.2 K/uL    RBC 4.85 4.70 - 5.50 M/uL    HGB 14.3 13.0 - 16.0 g/dL    HCT 41.1 36.0 - 48.0 %    MCV 84.7 74.0 - 97.0 FL    MCH 29.5 24.0 - 34.0 PG    MCHC 34.8 31.0 - 37.0 g/dL    RDW 13.4 11.6 - 14.5 %    PLATELET 747 694 - 946 K/uL    MPV 9.8 9.2 - 11.8 FL    NEUTROPHILS 76 (H) 40 - 73 %    LYMPHOCYTES 14 (L) 21 - 52 %    MONOCYTES 10 3 - 10 %    EOSINOPHILS 0 0 - 5 %    BASOPHILS 0 0 - 2 %    ABS. NEUTROPHILS 10.2 (H) 1.8 - 8.0 K/UL    ABS. LYMPHOCYTES 1.9 0.9 - 3.6 K/UL    ABS. MONOCYTES 1.3 (H) 0.05 - 1.2 K/UL    ABS. EOSINOPHILS 0.0 0.0 - 0.4 K/UL    ABS.  BASOPHILS 0.0 0.0 - 0.1 K/UL    DF AUTOMATED     GLUCOSE, POC    Collection Time: 04/07/20  2:07 AM   Result Value Ref Range    Glucose (POC) 93 70 - 110 mg/dL   GLUCOSTABILIZER    Collection Time: 04/07/20  2:09 AM   Result Value Ref Range    Glucose 93 mg/dL    Insulin order 0.7 units/hour    Insulin adminstered 0.7 units/hour    Multiplier 0.020     Low target 140 mg/dL    High target 180 mg/dL    D50 order 0.0 ml    D50 administered 0.00 ml    Minutes until next BG 60 min    Order initials sc     Administered initials sc    GLUCOSE, POC    Collection Time: 04/07/20  3:06 AM   Result Value Ref Range    Glucose (POC) 93 70 - 110 mg/dL   GLUCOSTABILIZER    Collection Time: 04/07/20  3:10 AM   Result Value Ref Range    Glucose 93 mg/dL    Insulin order 0.3 units/hour    Insulin adminstered 0.3 units/hour    Multiplier 0.010     Low target 140 mg/dL    High target 180 mg/dL    D50 order 0.0 ml    D50 administered 0.00 ml    Minutes until next BG 60 min    Order initials sc     Administered initials sc    GLUCOSE, POC    Collection Time: 04/07/20  4:07 AM   Result Value Ref Range    Glucose (POC) 126 (H) 70 - 110 mg/dL   GLUCOSTABILIZER    Collection Time: 04/07/20  4:08 AM   Result Value Ref Range    Glucose 126 mg/dL    Insulin order 0.0 units/hour    Insulin adminstered 0.0 units/hour    Multiplier 0.000     Low target 140 mg/dL    High target 180 mg/dL    D50 order 0.0 ml    D50 administered 0.00 ml    Minutes until next BG 60 min    Order initials sc     Administered initials sc    GLUCOSE, POC    Collection Time: 04/07/20  5:05 AM   Result Value Ref Range    Glucose (POC) 146 (H) 70 - 110 mg/dL GLUCOSTABILIZER    Collection Time: 04/07/20  5:06 AM   Result Value Ref Range    Glucose 146 mg/dL    Insulin order 0.0 units/hour    Insulin adminstered 0.0 units/hour    Multiplier 0.000     Low target 140 mg/dL    High target 180 mg/dL    D50 order 0.0 ml    D50 administered 0.00 ml    Minutes until next BG 60 min    Order initials sc     Administered initials sc    GLUCOSE, POC    Collection Time: 04/07/20  6:03 AM   Result Value Ref Range    Glucose (POC) 159 (H) 70 - 110 mg/dL   GLUCOSTABILIZER    Collection Time: 04/07/20  6:04 AM   Result Value Ref Range    Glucose 159 mg/dL    Insulin order 0.0 units/hour    Insulin adminstered 0.0 units/hour    Multiplier 0.000     Low target 140 mg/dL    High target 180 mg/dL    D50 order 0.0 ml    D50 administered 0.00 ml    Minutes until next BG 60 min    Order initials sc     Administered initials sc    MAGNESIUM    Collection Time: 04/07/20  6:13 AM   Result Value Ref Range    Magnesium 2.1 1.6 - 2.6 mg/dL   METABOLIC PANEL, BASIC    Collection Time: 04/07/20  6:13 AM   Result Value Ref Range    Sodium 142 136 - 145 mmol/L    Potassium 4.2 3.5 - 5.5 mmol/L    Chloride 110 100 - 111 mmol/L    CO2 24 21 - 32 mmol/L    Anion gap 8 3.0 - 18 mmol/L    Glucose 206 (H) 74 - 99 mg/dL    BUN 15 7.0 - 18 MG/DL    Creatinine 1.04 0.6 - 1.3 MG/DL    BUN/Creatinine ratio 14 12 - 20      GFR est AA >60 >60 ml/min/1.73m2    GFR est non-AA >60 >60 ml/min/1.73m2    Calcium 8.6 8.5 - 10.1 MG/DL   GLUCOSE, POC    Collection Time: 04/07/20  7:00 AM   Result Value Ref Range    Glucose (POC) 221 (H) 70 - 110 mg/dL   GLUCOSTABILIZER    Collection Time: 04/07/20  7:04 AM   Result Value Ref Range    Glucose 221 mg/dL    Insulin order 1.6 units/hour    Insulin adminstered 1.6 units/hour    Multiplier 0.010     Low target 140 mg/dL    High target 180 mg/dL    D50 order 0.0 ml    D50 administered 0.00 ml    Minutes until next BG 60 min    Order initials sc     Administered initials sc GLUCOSE, POC    Collection Time: 04/07/20  8:07 AM   Result Value Ref Range    Glucose (POC) 208 (H) 70 - 110 mg/dL   GLUCOSTABILIZER    Collection Time: 04/07/20  8:07 AM   Result Value Ref Range    Glucose 208 mg/dL    Insulin order 3.0 units/hour    Insulin adminstered 3.0 units/hour    Multiplier 0.020     Low target 140 mg/dL    High target 180 mg/dL    D50 order 0.0 ml    D50 administered 0.00 ml    Minutes until next BG 60 min    Order initials SM     Administered initials SM    GLUCOSE, POC    Collection Time: 04/07/20  9:04 AM   Result Value Ref Range    Glucose (POC) 234 (H) 70 - 110 mg/dL   GLUCOSTABILIZER    Collection Time: 04/07/20  9:05 AM   Result Value Ref Range    Glucose 234 mg/dL    Insulin order 5.2 units/hour    Insulin adminstered 5.2 units/hour    Multiplier 0.030     Low target 140 mg/dL    High target 180 mg/dL    D50 order 0.0 ml    D50 administered 0.00 ml    Minutes until next BG 60 min    Order initials SM     Administered initials           Chemistry Recent Labs     04/07/20  0613 04/07/20  0200 04/06/20  2155  04/06/20  0956 04/06/20  0600   * 103* 258*   < > 356* 784*    143 142   < > 139 132*   K 4.2 3.7 3.8   < > 4.3 4.7    110 111   < > 106 91*   CO2 24 26 21   < > 18* 12*   BUN 15 17 20*   < > 26* 29*   CREA 1.04 1.04 1.12   < > 1.54* 1.89*   CA 8.6 8.4* 8.3*   < > 9.6 10.9*   MG 2.1 2.1 2.2   < > 2.6 2.7*   PHOS  --   --   --   --  2.7  --    AGAP 8 7 10   < > 15 29*   BUCR 14 16 18   < > 17 15   AP  --  88  --   --  117 141*   TP  --  5.3*  --   --  6.6 7.7   ALB  --  2.8*  --   --  3.5 4.2   GLOB  --  2.5  --   --  3.1 3.5   AGRAT  --  1.1  --   --  1.1 1.2    < > = values in this interval not displayed.         Lactic Acid Lactic acid   Date Value Ref Range Status   04/06/2020 5.0 () 0.4 - 2.0 MMOL/L Final     Comment:     CALLED TO AND CORRECTLY REPEATED BY:  [facility] RN ER TO [facility] AT 0706 ON [id number]       Recent Labs     04/06/20  0600   LAC 5.0* Liver Enzymes Protein, total   Date Value Ref Range Status   04/07/2020 5.3 (L) 6.4 - 8.2 g/dL Final     Albumin   Date Value Ref Range Status   04/07/2020 2.8 (L) 3.4 - 5.0 g/dL Final     Globulin   Date Value Ref Range Status   04/07/2020 2.5 2.0 - 4.0 g/dL Final     A-G Ratio   Date Value Ref Range Status   04/07/2020 1.1 0.8 - 1.7   Final     AST (SGOT)   Date Value Ref Range Status   04/07/2020 17 10 - 38 U/L Final     Alk. phosphatase   Date Value Ref Range Status   04/07/2020 88 45 - 117 U/L Final     Recent Labs     04/07/20  0200 04/06/20  0956 04/06/20  0600   TP 5.3* 6.6 7.7   ALB 2.8* 3.5 4.2   GLOB 2.5 3.1 3.5   AGRAT 1.1 1.1 1.2   SGOT 17 14 17   AP 88 117 141*        CBC w/Diff Recent Labs     04/07/20  0200 04/06/20  0600   WBC 13.4* 23.8*   RBC 4.85 5.47   HGB 14.3 16.3*   HCT 41.1 46.6    280   GRANS 76* 90*   LYMPH 14* 4*   EOS 0 0        Cardiac Enzymes No results found for: CPK, CK, CKMMB, CKMB, RCK3, CKMBT, CKNDX, CKND1, CHRISTAL, TROPT, TROIQ, VENKATESH, TROPT, TNIPOC, BNP, BNPP     BNP No results found for: BNP, BNPP, XBNPT     Coagulation No results for input(s): PTP, INR, APTT, INREXT, INREXT in the last 72 hours.       Thyroid  No results found for: T4, T3U, TSH, TSHEXT, TSHEXT    No results found for: T4     Urinalysis Lab Results   Component Value Date/Time    Color YELLOW 08/04/2019 03:30 PM    Appearance CLEAR 08/04/2019 03:30 PM    Specific gravity 1.016 08/04/2019 03:30 PM    pH (UA) 5.5 08/04/2019 03:30 PM    Protein TRACE (A) 08/04/2019 03:30 PM    Glucose NEGATIVE  08/04/2019 03:30 PM    Ketone TRACE (A) 08/04/2019 03:30 PM    Bilirubin NEGATIVE  08/04/2019 03:30 PM    Urobilinogen 1.0 08/04/2019 03:30 PM    Nitrites NEGATIVE  08/04/2019 03:30 PM    Leukocyte Esterase NEGATIVE  08/04/2019 03:30 PM    Epithelial cells FEW 08/04/2019 03:30 PM    Bacteria NEGATIVE  08/04/2019 03:30 PM    WBC 0 to 3 08/04/2019 03:30 PM    RBC 0 to 3 08/04/2019 03:30 PM        Micro  No results for input(s): SDES, CULT in the last 72 hours. No results for input(s): CULT in the last 72 hours. Culture data during this hospitalization. All Micro Results     None           Images report reviewed by me:  CT (Most Recent) (CT chest reviewed by me) Results from Hospital Encounter encounter on 08/04/19   CT HEAD WO CONT    Narrative EXAM: CT head    CLINICAL HISTORY/INDICATION: Seizures new or progressive; seizure  -Additional: None    COMPARISON: 11/01/13    TECHNIQUE: Axial CT imaging of the head was performed without intravenous  contrast.  One or more dose reduction techniques were used on this CT: automated  exposure control, adjustment of the mAs and/or kVp according to patient size,  and iterative reconstruction techniques. The specific techniques used on this  CT exam have been documented in the patient's electronic medical record. Digital  Imaging and Communications in Medicine (DICOM) format image data are available  to nonaffiliated external healthcare facilities or entities on a secure, media  free, reciprocally searchable basis with patient authorization for at least a  12-month period after this study. _______________    FINDINGS:    Brain And Posterior Fossa: The sulci, folia, ventricles and basal cisterns are  within normal limits for the patient's age. There is no intracranial hemorrhage,  mass effect, or midline shift. There are no areas of abnormal parenchymal  attenuation. Extra-Axial Spaces And Meninges: There are no abnormal extra-axial fluid  collections. Calvarium: Intact. Sinuses: Clear. Other: None.    _______________      Impression IMPRESSION:    No acute intracranial abnormalities. CXR reviewed by me:  XR (Most Recent).  CXR  reviewed by me and compared with previous CXR Results from Hospital Encounter encounter on 04/06/20   XR CHEST PORT    Narrative EXAM: XR CHEST PORT    CLINICAL INDICATION/HISTORY: leukocytosis, dka  -Additional:    COMPARISON: 8/4/2019    TECHNIQUE: Portable frontal view of the chest    _______________    FINDINGS:    SUPPORT DEVICES: None. HEART AND MEDIASTINUM: Cardiomediastinal silhouette within normal limits. LUNGS AND PLEURAL SPACES: No dense consolidation, large effusion or  pneumothorax.    _______________      Impression IMPRESSION:    No acute cardiopulmonary abnormality.             Nell Herrera MD  4/7/2020

## 2020-04-07 NOTE — ROUTINE PROCESS
Bedside and Verbal shift change report given to Jamesiijesus 112 (oncoming nurse) by Darrick Lu RN (offgoing nurse). Report included the following information SBAR, Kardex, MAR and Recent Results.

## 2020-04-08 VITALS
RESPIRATION RATE: 16 BRPM | DIASTOLIC BLOOD PRESSURE: 80 MMHG | WEIGHT: 169.97 LBS | OXYGEN SATURATION: 100 % | HEIGHT: 71 IN | HEART RATE: 86 BPM | BODY MASS INDEX: 23.8 KG/M2 | SYSTOLIC BLOOD PRESSURE: 129 MMHG | TEMPERATURE: 98 F

## 2020-04-08 PROBLEM — D72.829 LEUKOCYTOSIS: Status: RESOLVED | Noted: 2020-04-06 | Resolved: 2020-04-08

## 2020-04-08 PROBLEM — E87.20 METABOLIC ACIDOSIS: Status: RESOLVED | Noted: 2020-04-06 | Resolved: 2020-04-08

## 2020-04-08 PROBLEM — N17.9 AKI (ACUTE KIDNEY INJURY) (HCC): Status: RESOLVED | Noted: 2020-04-06 | Resolved: 2020-04-08

## 2020-04-08 LAB
ALBUMIN SERPL-MCNC: 3.1 G/DL (ref 3.4–5)
ALBUMIN/GLOB SERPL: 1.2 {RATIO} (ref 0.8–1.7)
ALP SERPL-CCNC: 86 U/L (ref 45–117)
ALT SERPL-CCNC: 25 U/L (ref 16–61)
ANION GAP SERPL CALC-SCNC: 10 MMOL/L (ref 3–18)
AST SERPL-CCNC: 23 U/L (ref 10–38)
BASOPHILS # BLD: 0 K/UL (ref 0–0.1)
BASOPHILS NFR BLD: 0 % (ref 0–2)
BILIRUB SERPL-MCNC: 1.9 MG/DL (ref 0.2–1)
BUN SERPL-MCNC: 8 MG/DL (ref 7–18)
BUN/CREAT SERPL: 10 (ref 12–20)
CALCIUM SERPL-MCNC: 8.5 MG/DL (ref 8.5–10.1)
CHLORIDE SERPL-SCNC: 101 MMOL/L (ref 100–111)
CO2 SERPL-SCNC: 25 MMOL/L (ref 21–32)
CREAT SERPL-MCNC: 0.78 MG/DL (ref 0.6–1.3)
DIFFERENTIAL METHOD BLD: ABNORMAL
EOSINOPHIL # BLD: 0 K/UL (ref 0–0.4)
EOSINOPHIL NFR BLD: 0 % (ref 0–5)
ERYTHROCYTE [DISTWIDTH] IN BLOOD BY AUTOMATED COUNT: 12.9 % (ref 11.6–14.5)
GLOBULIN SER CALC-MCNC: 2.6 G/DL (ref 2–4)
GLUCOSE BLD STRIP.AUTO-MCNC: 202 MG/DL (ref 70–110)
GLUCOSE BLD STRIP.AUTO-MCNC: 220 MG/DL (ref 70–110)
GLUCOSE BLD STRIP.AUTO-MCNC: 224 MG/DL (ref 70–110)
GLUCOSE BLD STRIP.AUTO-MCNC: 268 MG/DL (ref 70–110)
GLUCOSE BLD STRIP.AUTO-MCNC: 290 MG/DL (ref 70–110)
GLUCOSE SERPL-MCNC: 214 MG/DL (ref 74–99)
HCT VFR BLD AUTO: 38.3 % (ref 36–48)
HGB BLD-MCNC: 13.3 G/DL (ref 13–16)
LYMPHOCYTES # BLD: 1.7 K/UL (ref 0.9–3.6)
LYMPHOCYTES NFR BLD: 24 % (ref 21–52)
MAGNESIUM SERPL-MCNC: 1.7 MG/DL (ref 1.6–2.6)
MCH RBC QN AUTO: 29 PG (ref 24–34)
MCHC RBC AUTO-ENTMCNC: 34.7 G/DL (ref 31–37)
MCV RBC AUTO: 83.6 FL (ref 74–97)
MONOCYTES # BLD: 0.5 K/UL (ref 0.05–1.2)
MONOCYTES NFR BLD: 8 % (ref 3–10)
NEUTS SEG # BLD: 4.7 K/UL (ref 1.8–8)
NEUTS SEG NFR BLD: 68 % (ref 40–73)
PLATELET # BLD AUTO: 129 K/UL (ref 135–420)
PMV BLD AUTO: 10.3 FL (ref 9.2–11.8)
POTASSIUM SERPL-SCNC: 3.2 MMOL/L (ref 3.5–5.5)
PROT SERPL-MCNC: 5.7 G/DL (ref 6.4–8.2)
RBC # BLD AUTO: 4.58 M/UL (ref 4.7–5.5)
SODIUM SERPL-SCNC: 136 MMOL/L (ref 136–145)
WBC # BLD AUTO: 6.9 K/UL (ref 4.6–13.2)

## 2020-04-08 PROCEDURE — 74011250637 HC RX REV CODE- 250/637: Performed by: INTERNAL MEDICINE

## 2020-04-08 PROCEDURE — 83735 ASSAY OF MAGNESIUM: CPT

## 2020-04-08 PROCEDURE — 82962 GLUCOSE BLOOD TEST: CPT

## 2020-04-08 PROCEDURE — 74011636637 HC RX REV CODE- 636/637: Performed by: INTERNAL MEDICINE

## 2020-04-08 PROCEDURE — 77030040361 HC SLV COMPR DVT MDII -B

## 2020-04-08 PROCEDURE — 74011250636 HC RX REV CODE- 250/636: Performed by: INTERNAL MEDICINE

## 2020-04-08 PROCEDURE — 74011000250 HC RX REV CODE- 250: Performed by: INTERNAL MEDICINE

## 2020-04-08 PROCEDURE — 80053 COMPREHEN METABOLIC PANEL: CPT

## 2020-04-08 PROCEDURE — C9113 INJ PANTOPRAZOLE SODIUM, VIA: HCPCS | Performed by: INTERNAL MEDICINE

## 2020-04-08 PROCEDURE — 74011250637 HC RX REV CODE- 250/637: Performed by: HOSPITALIST

## 2020-04-08 PROCEDURE — 85025 COMPLETE CBC W/AUTO DIFF WBC: CPT

## 2020-04-08 PROCEDURE — 36415 COLL VENOUS BLD VENIPUNCTURE: CPT

## 2020-04-08 RX ORDER — POTASSIUM CHLORIDE 20 MEQ/1
40 TABLET, EXTENDED RELEASE ORAL
Status: COMPLETED | OUTPATIENT
Start: 2020-04-08 | End: 2020-04-08

## 2020-04-08 RX ADMIN — SODIUM CHLORIDE 10 ML: 9 INJECTION, SOLUTION INTRAMUSCULAR; INTRAVENOUS; SUBCUTANEOUS at 08:54

## 2020-04-08 RX ADMIN — OXYCODONE HYDROCHLORIDE AND ACETAMINOPHEN 1 TABLET: 5; 325 TABLET ORAL at 09:04

## 2020-04-08 RX ADMIN — POTASSIUM CHLORIDE 40 MEQ: 1500 TABLET, EXTENDED RELEASE ORAL at 09:39

## 2020-04-08 RX ADMIN — INSULIN LISPRO 1 UNITS: 100 INJECTION, SOLUTION INTRAVENOUS; SUBCUTANEOUS at 08:52

## 2020-04-08 RX ADMIN — INSULIN LISPRO 4 UNITS: 100 INJECTION, SOLUTION INTRAVENOUS; SUBCUTANEOUS at 08:51

## 2020-04-08 RX ADMIN — PANTOPRAZOLE SODIUM 40 MG: 40 INJECTION, POWDER, FOR SOLUTION INTRAVENOUS at 08:53

## 2020-04-08 RX ADMIN — HEPARIN SODIUM 5000 UNITS: 5000 INJECTION INTRAVENOUS; SUBCUTANEOUS at 02:45

## 2020-04-08 RX ADMIN — ANTACID TABLETS 200 MG: 500 TABLET, CHEWABLE ORAL at 08:57

## 2020-04-08 RX ADMIN — Medication 10 ML: at 09:07

## 2020-04-08 RX ADMIN — FOLIC ACID: 5 INJECTION, SOLUTION INTRAMUSCULAR; INTRAVENOUS; SUBCUTANEOUS at 09:04

## 2020-04-08 RX ADMIN — POTASSIUM CHLORIDE 40 MEQ: 1500 TABLET, EXTENDED RELEASE ORAL at 13:09

## 2020-04-08 NOTE — DIABETES MGMT
GLYCEMIC CONTROL PROGRESS NOTE:    - known h/o T1DM, HbA1C not within recommended range, admitted in DKA  - transitioned from Sheliah Guess insulin gtt yesterday to subq insulin regimen  - [employer] insulin pump with Humalog restarted today ( orders entered with permission from Dr. Elvi Mccain)  - pt coherent and able to place and operate pump independently at this time  - fresh cannula placed on lower left abdomen, pump agreement reviewed, signed & on file  - pt pleasant and working well with staff regarding pump management  - recommend continue with current pt management of insulin via home pump   * please obtain label from pharmacy to scan when pt boluses for documentation purposes, will feed in to [facility]  * please complete Sub-Q insulin infusion device Line assessment 1x/shift under flowsheets   -per insulin pump policy and agreement pt is responsible for management of home insulin pump & providing ALL pump supplies including insulin (please send any insulin vials to pharmacy for labeling)  - please check BG Q 1 hour x3 to confirm pump is operational AEB BG trending down    Insulin Pump Settings     12AM- 9AM 1.0 units /hour; 9AM-1PM 1.5 units/hour; 1PM-12AM 1.5 units /hour  Basal 30.7 units in 24 hours  ICR 1:6 ISF 60 mg/dL  Recent Glucose Results:   Lab Results   Component Value Date/Time     (H) 04/08/2020 02:20 AM    GLUCPOC 224 (H) 04/08/2020 12:20 PM    GLUCPOC 268 (H) 04/08/2020 11:26 AM    GLUCPOC 290 (H) 04/08/2020 08:44 AM       Vita Kevin MS, RN, CDE  Glycemic Control Team  [phone]  Pager [phone] (M-TH 8:00-4:30P)  *After Hours pager [phone]

## 2020-04-08 NOTE — PROGRESS NOTES
Discharge instructions reviewed with the patient. Patient verbalized understanding and verified by teach back. All questions answered. IV discontinued, no redness, swelling or pain noted. Patient discharged off the unit via walking. Patient armband removed and shredded.

## 2020-04-08 NOTE — PROGRESS NOTES
7308-[id number] see flowsheet 1 percocet tab given for pain with eating, abdominal pain 4/10.    0919-Paged MD Lucia Cornell, pt potassium is 3.2, will request potassium, also can we have order to start insulin pump, also platelets have decreased are at 129, can Heparin be held?    0925-Spoke to Lucia Cornell said give 40 MEQ Potassium PO now and no need to hold heparin for 129 platelet count and he can have order for insulin pump. Also informed him of pt abdominal pain and that he was given percocet. Also told him pt said he wants to go home today. 1132-Pt has insulin pump lantus was cancelled. 1236-MD Lucia Cornell said his blood sugar can be checked again and after his potassium he can go home.

## 2020-04-08 NOTE — PROGRESS NOTES
Patient transferred out of ICU. No acute pulmonary issues. PCCM will sign off. Please call us with any questions.      Ollie Ferrera MD 4/8/2020 9:50 AM

## 2020-04-08 NOTE — PROGRESS NOTES
1935 - Bedside report received from [place], [facility]. Patient in bed. Pain 0/10.     2020 - Patient in bed at this time. IV to R AC  intact and patent. Sequential compression device bilaterally. TEDs.  + CMS. Pt A & O x 4. LS clear, on RA. Abdomen soft, NT and ND. + BS to all 4 quadrants. Denies nausea. Pain 0/10. Call light within reach. Pt tachy per monitor. Checked on pt. Said he had gotten up to charge his phone. Denies any other symptoms. Pt had uneventful shift. Bs has been high, was checking Q 4 hrs per day RN. CIWA remains at zero this shift. No issues/concerns at this time.  Call bell within reach

## 2020-04-08 NOTE — PROGRESS NOTES
Noted pt transfer to [address] [place] [address]. CM reviewed previous CM documentation. Noted pt has been offered assistance with an [address] and declined. Pt is aware information for the Alcohol Hotline will be in his discharge paper work. Please encourage ambulation. Anticipate pt will transition home with physician follow up with in the next 24 hours. No other transition of care needs have been identified. CM to continue to follow and assist.    Care Management Interventions  PCP Verified by CM: Yes  Mode of Transport at Discharge:  Other (see comment)(Family)  Transition of Care Consult (CM Consult): Discharge Planning  Health Maintenance Reviewed: Yes  Current Support Network: Relative's Home  Confirm Follow Up Transport: Self  The Plan for Transition of Care is Related to the Following Treatment Goals : [facility] and physician follow up vs home with physician follow up   Discharge Location  Discharge Placement: Home with family assistance(vs [facility])

## 2020-04-08 NOTE — ROUTINE PROCESS
Bedside and Verbal shift change report given to [facility], RN by Lucho Oglesby. Report included the following information SBAR, Kardex, OR Summary, Intake/Output and MAR.

## 2020-04-08 NOTE — DISCHARGE SUMMARY
Discharge Summary    Patient: Jeri Vega MRN: [id number]  CSN: [id number]    YOB: 1995  Age: 21 y.o. Sex: male    DOA: 4/6/2020 LOS:  LOS: 2 days   Discharge Date: 4/8/2020     Primary Care Provider:  Melissa Borja MD    Admission Diagnoses: DKA, type 1 ([facility] [E10.10]    Discharge Diagnoses:    Problem List as of 4/8/2020 Never Reviewed          Codes Class Noted - Resolved    * (Principal) DKA, type 1 ([facility] ICD-10-CM: E10.10  ICD-9-CM: 250.13  4/6/2020 - Present        Alcohol abuse ICD-10-CM: F10.10  ICD-9-CM: 305.00  4/6/2020 - Present        Type I diabetes mellitus ([facility] ICD-10-CM: E10.9  ICD-9-CM: 250.01  4/1/2019 - Present        RESOLVED: DON (acute kidney injury) ([facility] ICD-10-CM: N17.9  ICD-9-CM: 584.9  4/6/2020 - 4/8/2020        RESOLVED: Dehydration ICD-10-CM: E86.0  ICD-9-CM: 276.51  4/6/2020 - 4/7/2020        RESOLVED: Metabolic acidosis [id number]: E87.2  ICD-9-CM: 276.2  4/6/2020 - 4/8/2020        RESOLVED: Leukocytosis ICD-10-CM: D72.829  ICD-9-CM: 288.60  4/6/2020 - 4/8/2020              Discharge Medications:     Discharge Medication List as of 4/8/2020  1:18 PM      CONTINUE these medications which have NOT CHANGED    Details   ondansetron hcl (Zofran) 4 mg tablet Take 4 mg by mouth every eight (8) hours as needed for Nausea or Nausea or Vomiting., Historical Med      insulin syringe-needle U-100 (BD INSULIN SYRINGE SAFETYGLIDE) 1 mL 29 gauge x 1/2\" syrg 1 Syringe by Does Not Apply route Before breakfast, lunch, and dinner., Print, Disp-30 Syringe, R-0      lancets-blood glucose strips 30 gauge cmpk 1 Each by Does Not Apply route four (4) times daily. , Normal, Disp-1 Dose Pack, R-0      insulin lispro (HUMALOG U-100 INSULIN) 100 unit/mL injection Use as directed with insulin pump, Normal, Disp-1 Vial, R-0      fluoxetine HCl (PROZAC PO) Take 20 mg by mouth daily. , Historical Med      AMPHET ASP/AMPHET/D-AMPHET (ADDERALL PO) Take 30 mg by mouth two (2) times a day. , Historical Med       insulin pump (PATIENT SUPPLIED) Misc This record is for patients using their home insulin pump patient, consider labs as appropriateby SubCUTAneous route as needed. Historical Med      methocarbamol (ROBAXIN-750) 750 mg tablet Take 1 Tab by mouth four (4) times daily. , Print, Disp-12 Tab, R-0      Blood-Glucose Meter monitoring kit Use as directed., Normal, Disp-1 Kit, R-0      RANITIDINE HCL PO Take  by mouth., Historical Med      promethazine HCl (PROMETHAZINE PO) Take  by mouth., Historical Med         STOP taking these medications       ibuprofen (MOTRIN) 600 mg tablet Comments:   Reason for Stopping:               Discharge Condition: Good    Procedures : None    Consults: Pulmonary/Critical Care      PHYSICAL EXAM   Visit Vitals  /80 (BP 1 Location: Left arm, BP Patient Position: At rest)   Pulse 86   Temp 98 °F (36.7 °C)   Resp 16   Ht 5' 11\" (1.803 m)   Wt 77.1 kg (169 lb 15.6 oz)   SpO2 100%   BMI 23.71 kg/m²     General: Awake, cooperative, no acute distress    HEENT: NC, Atraumatic. PERRLA, EOMI. Anicteric sclerae. Lungs:  CTA Bilaterally. No Wheezing/Rhonchi/Rales. Heart:  Regular  rhythm,  No murmur, No Rubs, No Gallops  Abdomen: Soft, Non distended, Non tender. +Bowel sounds,   Extremities: No c/c/e  Psych:   Not anxious or agitated. Neurologic:  No acute neurological deficits.                                      Admission HPI :   Fanta Her a 24 y.o.  male who has history of diabetes, attention deficit, alcoholism, and with withdrawal seizures presents to the emergency room with nausea and vomiting that has been persistent since Friday.  Patient admits to drinking over 600 cc of [place] and since then has had intractable vomiting and generalized weakness.  Patient has been alcohol free for about a month but then had a recent break-up and started drinking 6-12 beers a day. [facility] has not had a seizure in over a year this was related to alcohol.  He last drank at 2:00 in the morning on Saturday.  In the emergency room he was found to have acute kidney injury DKA.  I am asked to admit for the above findings. Hector Miranda denies recent marijuana use since January and has not used cocaine in about a year. [facility] denies any suicidal thoughts he has been off his depression medication Zoloft for the last month and a half.  Patient contracts to safety.     [id number] risk factors  Has been self isolating  Has a roommate but has not seen the roommate in 2 weeks  Has only been around immediate family denies fever cough shortness of breath or recent travel.  Family members are all well and have all practice social distancing due to to his mother having asthma    Hospital Course :   Mr. Courtney Wood was initially admitted to intensive care unit, he was seen in bowel followed by pulmonary critical care, he did not had any acute events during hospitalization. Once his condition improved he was transitioned to medical floor. DKA-  He was started on insulin drip, once his blood sugars improved and he was out of acidosis he was transitioned to basal and sliding scale insulin. His blood sugars were controlled he wanted to be switched back to his insulin pump his insulin pump was started this morning. His blood sugars are now reasonable and he is eager to go home. Advised to follow-up with his endocrinologist.    Leukocytosis-  Reactive, no evidence of infection, WBC 9 normal range. Alcohol use-  He was started on CIWA protocol and banana bag. He did not had any withdrawal symptoms.     Activity: Activity as tolerated    Diet: Diabetic Diet    Follow-up: PCP, endocrinology    Disposition: home    Minutes spent on discharge: 45       Labs: Results:       Chemistry Recent Labs     04/08/20  0220 04/07/20  1350 04/07/20  1015  04/07/20  0200  04/06/20  0956   * 140* 214*   < > 103*   < > 356*    141 140   < > 143   < > 139   K 3.2* 3.6 3.7   < > 3.7   < > 4.3    110 110   < > 110   < > 106   CO2 25 25 26   < > 26   < > 18*   BUN 8 12 13   < > 17   < > 26*   CREA 0.78 0.82 1.06   < > 1.04   < > 1.54*   CA 8.5 8.4* 8.1*   < > 8.4*   < > 9.6   AGAP 10 6 4   < > 7   < > 15   BUCR 10* 15 12   < > 16   < > 17   AP 86  --   --   --  88  --  117   TP 5.7*  --   --   --  5.3*  --  6.6   ALB 3.1*  --   --   --  2.8*  --  3.5   GLOB 2.6  --   --   --  2.5  --  3.1   AGRAT 1.2  --   --   --  1.1  --  1.1    < > = values in this interval not displayed. CBC w/Diff Recent Labs     04/08/20  0220 04/07/20  0200 04/06/20  0600   WBC 6.9 13.4* 23.8*   RBC 4.58* 4.85 5.47   HGB 13.3 14.3 16.3*   HCT 38.3 41.1 46.6   * 169 280   GRANS 68 76* 90*   LYMPH 24 14* 4*   EOS 0 0 0      Cardiac Enzymes No results for input(s): CPK, CKND1, CHRISTAL in the last 72 hours. No lab exists for component: CKRMB, TROIP   Coagulation No results for input(s): PTP, INR, APTT, INREXT, INREXT in the last 72 hours. Lipid Panel No results found for: CHOL, CHOLPOCT, CHOLX, CHLST, CHOLV, [id number], HDL, HDLP, LDL, LDLC, DLDLP, [id number], VLDLC, VLDL, TGLX, TRIGL, TRIGP, TGLPOCT, CHHD, CHHDX   BNP No results for input(s): BNPP in the last 72 hours. Liver Enzymes Recent Labs     04/08/20  0220   TP 5.7*   ALB 3.1*   AP 86   SGOT 23      Thyroid Studies No results found for: T4, T3U, TSH, TSHEXT, TSHEXT         Significant Diagnostic Studies: Xr Chest Port    Result Date: 4/6/2020  EXAM: XR CHEST PORT CLINICAL INDICATION/HISTORY: leukocytosis, dka -Additional: COMPARISON: 8/4/2019 TECHNIQUE: Portable frontal view of the chest _______________ FINDINGS: SUPPORT DEVICES: None. HEART AND MEDIASTINUM: Cardiomediastinal silhouette within normal limits. LUNGS AND PLEURAL SPACES: No dense consolidation, large effusion or pneumothorax. _______________     IMPRESSION: No acute cardiopulmonary abnormality. No results found for this or any previous visit.        Please note that this dictation was completed with Melissa the computer voice recognition software. Quite often unanticipated grammatical, syntax, homophones, and other interpretive errors are inadvertently transcribed by the computer software. Please disregard these errors. Please excuse any errors that have escaped final proofreading.      CC: Melissa Borja MD

## 2020-04-09 ENCOUNTER — PATIENT OUTREACH (OUTPATIENT)
Dept: CASE MANAGEMENT | Age: 25
End: 2020-04-09

## 2020-04-09 NOTE — PROGRESS NOTES
Patient contacted regarding recent discharge and COVID-19 risk     Received two calls from Patient on phone. CTN was unable to hear Patient on Phone. CTN attempted to call Patient back someone  the phone and CTN cant hear anything. Will try again.

## 2020-04-10 ENCOUNTER — PATIENT OUTREACH (OUTPATIENT)
Dept: CASE MANAGEMENT | Age: 25
End: 2020-04-10

## 2020-04-10 NOTE — PROGRESS NOTES
Patient contacted regarding recent discharge and COVID-19 risk       CTN  Attempt to contact patient via telephone on 4/10/20 for follow up. Unable to reach. Left message on voicemail with office contact information. No Patient medical information left on message.

## 2020-04-29 ENCOUNTER — PATIENT OUTREACH (OUTPATIENT)
Dept: CASE MANAGEMENT | Age: 25
End: 2020-04-29

## 2020-04-29 NOTE — PROGRESS NOTES
Patient resolved from Transition of Care episode on 4/29/20    CTN Attempt to contact patient via telephone on 4/29/20 for follow up. Unable to reach. Left message on voicemail with office contact information. No Patient medical information  left on message. No further outreach scheduled with this CTN. Episode of Care resolved.

## 2020-08-06 ENCOUNTER — HOSPITAL ENCOUNTER (EMERGENCY)
Age: 25
Discharge: HOME | End: 2020-08-06
Attending: EMERGENCY MEDICINE
Payer: MEDICAID

## 2020-08-06 VITALS
SYSTOLIC BLOOD PRESSURE: 121 MMHG | RESPIRATION RATE: 14 BRPM | BODY MASS INDEX: 23.8 KG/M2 | DIASTOLIC BLOOD PRESSURE: 75 MMHG | HEART RATE: 105 BPM | WEIGHT: 170 LBS | OXYGEN SATURATION: 97 % | TEMPERATURE: 97.7 F | HEIGHT: 71 IN

## 2020-08-06 DIAGNOSIS — L08.9 SKIN INFECTION: Primary | ICD-10-CM

## 2020-08-06 DIAGNOSIS — R73.9 HYPERGLYCEMIA: ICD-10-CM

## 2020-08-06 LAB
ALBUMIN SERPL-MCNC: 3.7 G/DL (ref 3.4–5)
ALBUMIN/GLOB SERPL: 1.2 {RATIO} (ref 0.8–1.7)
ALP SERPL-CCNC: 77 U/L (ref 45–117)
ALT SERPL-CCNC: 31 U/L (ref 16–61)
ANION GAP SERPL CALC-SCNC: 5 MMOL/L (ref 3–18)
AST SERPL-CCNC: 24 U/L (ref 10–38)
BASOPHILS # BLD: 0 K/UL (ref 0–0.1)
BASOPHILS NFR BLD: 1 % (ref 0–2)
BILIRUB SERPL-MCNC: 1.5 MG/DL (ref 0.2–1)
BUN SERPL-MCNC: 13 MG/DL (ref 7–18)
BUN/CREAT SERPL: 13 (ref 12–20)
CALCIUM SERPL-MCNC: 8.9 MG/DL (ref 8.5–10.1)
CHLORIDE SERPL-SCNC: 101 MMOL/L (ref 100–111)
CO2 SERPL-SCNC: 30 MMOL/L (ref 21–32)
CREAT SERPL-MCNC: 1.01 MG/DL (ref 0.6–1.3)
DIFFERENTIAL METHOD BLD: ABNORMAL
EOSINOPHIL # BLD: 0 K/UL (ref 0–0.4)
EOSINOPHIL NFR BLD: 1 % (ref 0–5)
ERYTHROCYTE [DISTWIDTH] IN BLOOD BY AUTOMATED COUNT: 11.8 % (ref 11.6–14.5)
GLOBULIN SER CALC-MCNC: 3.1 G/DL (ref 2–4)
GLUCOSE BLD STRIP.AUTO-MCNC: 100 MG/DL (ref 70–110)
GLUCOSE BLD STRIP.AUTO-MCNC: 35 MG/DL (ref 70–110)
GLUCOSE BLD STRIP.AUTO-MCNC: 395 MG/DL (ref 70–110)
GLUCOSE BLD STRIP.AUTO-MCNC: 40 MG/DL (ref 70–110)
GLUCOSE SERPL-MCNC: 410 MG/DL (ref 74–99)
HCT VFR BLD AUTO: 46.9 % (ref 36–48)
HGB BLD-MCNC: 16.4 G/DL (ref 13–16)
LYMPHOCYTES # BLD: 1.6 K/UL (ref 0.9–3.6)
LYMPHOCYTES NFR BLD: 35 % (ref 21–52)
MCH RBC QN AUTO: 29.8 PG (ref 24–34)
MCHC RBC AUTO-ENTMCNC: 35 G/DL (ref 31–37)
MCV RBC AUTO: 85.3 FL (ref 74–97)
MONOCYTES # BLD: 0.6 K/UL (ref 0.05–1.2)
MONOCYTES NFR BLD: 13 % (ref 3–10)
NEUTS SEG # BLD: 2.4 K/UL (ref 1.8–8)
NEUTS SEG NFR BLD: 50 % (ref 40–73)
PLATELET # BLD AUTO: 225 K/UL (ref 135–420)
PMV BLD AUTO: 10.7 FL (ref 9.2–11.8)
POTASSIUM SERPL-SCNC: 4 MMOL/L (ref 3.5–5.5)
PROT SERPL-MCNC: 6.8 G/DL (ref 6.4–8.2)
RBC # BLD AUTO: 5.5 M/UL (ref 4.7–5.5)
SODIUM SERPL-SCNC: 136 MMOL/L (ref 136–145)
WBC # BLD AUTO: 4.7 K/UL (ref 4.6–13.2)

## 2020-08-06 PROCEDURE — 74011250636 HC RX REV CODE- 250/636: Performed by: EMERGENCY MEDICINE

## 2020-08-06 PROCEDURE — 85025 COMPLETE CBC W/AUTO DIFF WBC: CPT

## 2020-08-06 PROCEDURE — 87040 BLOOD CULTURE FOR BACTERIA: CPT

## 2020-08-06 PROCEDURE — 82962 GLUCOSE BLOOD TEST: CPT

## 2020-08-06 PROCEDURE — 96374 THER/PROPH/DIAG INJ IV PUSH: CPT

## 2020-08-06 PROCEDURE — 74011636637 HC RX REV CODE- 636/637: Performed by: EMERGENCY MEDICINE

## 2020-08-06 PROCEDURE — 80053 COMPREHEN METABOLIC PANEL: CPT

## 2020-08-06 PROCEDURE — 96361 HYDRATE IV INFUSION ADD-ON: CPT

## 2020-08-06 PROCEDURE — 99285 EMERGENCY DEPT VISIT HI MDM: CPT

## 2020-08-06 RX ORDER — BACITRACIN 500 [USP'U]/G
OINTMENT TOPICAL 3 TIMES DAILY
Qty: 1 TUBE | Refills: 0 | OUTPATIENT
Start: 2020-08-06 | End: 2020-08-06

## 2020-08-06 RX ORDER — DOXYCYCLINE 100 MG/1
100 CAPSULE ORAL 2 TIMES DAILY
Qty: 14 CAP | Refills: 0 | Status: SHIPPED | OUTPATIENT
Start: 2020-08-06 | End: 2020-08-13

## 2020-08-06 RX ORDER — DOXYCYCLINE 100 MG/1
100 CAPSULE ORAL 2 TIMES DAILY
Qty: 14 CAP | Refills: 0 | Status: SHIPPED | OUTPATIENT
Start: 2020-08-06 | End: 2020-08-06

## 2020-08-06 RX ORDER — MUPIROCIN CALCIUM 20 MG/G
CREAM TOPICAL 2 TIMES DAILY
Qty: 15 G | Refills: 0 | Status: SHIPPED | OUTPATIENT
Start: 2020-08-06

## 2020-08-06 RX ORDER — CEPHALEXIN 500 MG/1
500 CAPSULE ORAL 4 TIMES DAILY
Qty: 28 CAP | Refills: 0 | Status: SHIPPED | OUTPATIENT
Start: 2020-08-06 | End: 2020-08-06

## 2020-08-06 RX ORDER — MUPIROCIN CALCIUM 20 MG/G
CREAM TOPICAL 2 TIMES DAILY
Qty: 15 G | Refills: 0 | Status: SHIPPED | OUTPATIENT
Start: 2020-08-06 | End: 2020-08-06

## 2020-08-06 RX ORDER — CEPHALEXIN 500 MG/1
500 CAPSULE ORAL 4 TIMES DAILY
Qty: 28 CAP | Refills: 0 | Status: SHIPPED | OUTPATIENT
Start: 2020-08-06 | End: 2020-08-13

## 2020-08-06 RX ADMIN — HUMAN INSULIN 10 UNITS: 100 INJECTION, SOLUTION SUBCUTANEOUS at 11:53

## 2020-08-06 RX ADMIN — SODIUM CHLORIDE 1000 ML: 900 INJECTION, SOLUTION INTRAVENOUS at 10:19

## 2020-08-06 RX ADMIN — SODIUM CHLORIDE 1000 ML: 900 INJECTION, SOLUTION INTRAVENOUS at 11:55

## 2020-08-06 NOTE — ED NOTES
Dr. Mary vaughn made aware patient became disphoretic and renetta RN checked fsbs see critical  RN gave patient oJ to drink as per MD ordered with crackers and peanut butter followed by a tv dinner.  Patient is alert and oriented

## 2020-08-06 NOTE — ED NOTES
I have reviewed discharge instructions with the patient. The patient verbalized understanding.  Arm band removed and placed in shred it patient given 3 prescriptions

## 2020-08-06 NOTE — ED PROVIDER NOTES
EMERGENCY DEPARTMENT HISTORY AND PHYSICAL EXAM    Date: 8/6/2020  Patient Name: Mery Herzog    History of Presenting Illness     Chief Complaint   Patient presents with    Skin Problem         History Provided By: Patient    10:04 PM  Mery Herzog is a 31 y.o. male with PMHX of type I diabetic who presents to the emergency department C/O wounds. Per patient he noticed 3 small wounds on his right knee a few days ago and then another when it came on the back of his neck and on his left buttock. He denies any known injury or bites to this area. The ones on the knee have started to weep and itch. He denies any fever, shortness of breath, chest pain, nausea, vomiting, recent camping, any unusual water exposures, other complaints. No travel or sick contacts. PCP: Leeanne Aguirre MD    Current Facility-Administered Medications   Medication Dose Route Frequency Provider Last Rate Last Dose    sodium chloride 0.9 % bolus infusion 1,000 mL  1,000 mL IntraVENous ONCE Kain Humphreys MD        insulin regular (NOVOLIN R, HUMULIN R) injection 10 Units  10 Units IntraVENous NOW Kain Humphreys MD         Current Outpatient Medications   Medication Sig Dispense Refill    cephALEXin (Keflex) 500 mg capsule Take 1 Cap by mouth four (4) times daily for 7 days. 28 Cap 0    doxycycline (MONODOX) 100 mg capsule Take 1 Cap by mouth two (2) times a day for 7 days. 14 Cap 0    mupirocin calcium (BACTROBAN) 2 % topical cream Apply  to affected area two (2) times a day. 15 g 0    AMPHET ASP/AMPHET/D-AMPHET (ADDERALL PO) Take 30 mg by mouth two (2) times a day.  ondansetron hcl (Zofran) 4 mg tablet Take 4 mg by mouth every eight (8) hours as needed for Nausea or Nausea or Vomiting.  methocarbamol (ROBAXIN-750) 750 mg tablet Take 1 Tab by mouth four (4) times daily.  12 Tab 0    insulin syringe-needle U-100 (BD INSULIN SYRINGE SAFETYGLIDE) 1 mL 29 gauge x 1/2\" syrg 1 Syringe by Does Not Apply route Before breakfast, lunch, and dinner. 30 Syringe 0    lancets-blood glucose strips 30 gauge cmpk 1 Each by Does Not Apply route four (4) times daily. 1 Dose Pack 0    insulin lispro (HUMALOG U-100 INSULIN) 100 unit/mL injection Use as directed with insulin pump 1 Vial 0    Blood-Glucose Meter monitoring kit Use as directed. 1 Kit 0    fluoxetine HCl (PROZAC PO) Take 20 mg by mouth daily.  RANITIDINE HCL PO Take  by mouth.  promethazine HCl (PROMETHAZINE PO) Take  by mouth.   insulin pump (PATIENT SUPPLIED) Misc by SubCUTAneous route as needed. Past History     Past Medical History:  Past Medical History:   Diagnosis Date    ADD (attention deficit disorder)     Diabetes ([facility] [address]     Insomnia     Seizure ([facility] [address]        Past Surgical History:  History reviewed. No pertinent surgical history. Family History:  History reviewed. No pertinent family history. Social History:  Social History     Tobacco Use    Smoking status: Never Smoker    Smokeless tobacco: Former User   Substance Use Topics    Alcohol use: Yes     Alcohol/week: 16.0 standard drinks     Types: 10 Cans of beer, 6 Shots of liquor per week    Drug use: Not Currently       Allergies:  No Known Allergies      Review of Systems   Review of Systems   Constitutional: Negative for fever. Respiratory: Negative for shortness of breath. Cardiovascular: Negative for chest pain. Gastrointestinal: Negative for abdominal pain. Skin: Positive for wound. All other systems reviewed and are negative.         Physical Exam     Vitals:    08/06/20 0953 08/06/20 0958   BP: 110/78    Pulse: (!) 105    Resp: 14    Temp: 97.7 °F (36.5 °C)    SpO2: 98% 98%   Weight: 77.1 kg (170 lb)    Height: 5' 11\" (1.803 m)      Physical Exam    Nursing notes and vital signs reviewed    Constitutional: Non toxic appearing, moderate distress  Head: Normocephalic, Atraumatic  Eyes: EOMI  Neck: Supple  Cardiovascular: Regular rate and rhythm, no murmurs, rubs, or gallops  Chest: Normal work of breathing and chest excursion bilaterally  Back: No evidence of trauma or deformity  Extremities: No evidence of trauma or deformity  Skin: Warm and dry, normal cap refill, 3 wounds over the right knee approximately nickel size in diameter with erythema and blistering in the center, similar wounds on the left buttock and on the back of the neck, no fluctuance or induration  Neuro: Alert and appropriate  Psychiatric: Normal mood and affect      Diagnostic Study Results     Labs -     Recent Results (from the past 12 hour(s))   CBC WITH AUTOMATED DIFF    Collection Time: 08/06/20 10:00 AM   Result Value Ref Range    WBC 4.7 4.6 - 13.2 K/uL    RBC 5.50 4.70 - 5.50 M/uL    HGB 16.4 (H) 13.0 - 16.0 g/dL    HCT 46.9 36.0 - 48.0 %    MCV 85.3 74.0 - 97.0 FL    MCH 29.8 24.0 - 34.0 PG    MCHC 35.0 31.0 - 37.0 g/dL    RDW 11.8 11.6 - 14.5 %    PLATELET 675 501 - 200 K/uL    MPV 10.7 9.2 - 11.8 FL    NEUTROPHILS 50 40 - 73 %    LYMPHOCYTES 35 21 - 52 %    MONOCYTES 13 (H) 3 - 10 %    EOSINOPHILS 1 0 - 5 %    BASOPHILS 1 0 - 2 %    ABS. NEUTROPHILS 2.4 1.8 - 8.0 K/UL    ABS. LYMPHOCYTES 1.6 0.9 - 3.6 K/UL    ABS. MONOCYTES 0.6 0.05 - 1.2 K/UL    ABS. EOSINOPHILS 0.0 0.0 - 0.4 K/UL    ABS. BASOPHILS 0.0 0.0 - 0.1 K/UL    DF AUTOMATED     METABOLIC PANEL, COMPREHENSIVE    Collection Time: 08/06/20 10:00 AM   Result Value Ref Range    Sodium 136 136 - 145 mmol/L    Potassium 4.0 3.5 - 5.5 mmol/L    Chloride 101 100 - 111 mmol/L    CO2 30 21 - 32 mmol/L    Anion gap 5 3.0 - 18 mmol/L    Glucose 410 (HH) 74 - 99 mg/dL    BUN 13 7.0 - 18 MG/DL    Creatinine 1.01 0.6 - 1.3 MG/DL    BUN/Creatinine ratio 13 12 - 20      GFR est AA >60 >60 ml/min/1.73m2    GFR est non-AA >60 >60 ml/min/1.73m2    Calcium 8.9 8.5 - 10.1 MG/DL    Bilirubin, total 1.5 (H) 0.2 - 1.0 MG/DL    ALT (SGPT) 31 16 - 61 U/L    AST (SGOT) 24 10 - 38 U/L    Alk.  phosphatase 77 45 - 117 U/L    Protein, total 6.8 6.4 - 8.2 g/dL Albumin 3.7 3.4 - 5.0 g/dL    Globulin 3.1 2.0 - 4.0 g/dL    A-G Ratio 1.2 0.8 - 1.7     GLUCOSE, POC    Collection Time: 08/06/20 10:22 AM   Result Value Ref Range    Glucose (POC) 395 (H) 70 - 110 mg/dL       Radiologic Studies -   No orders to display     CT Results  (Last 48 hours)    None        CXR Results  (Last 48 hours)    None          Medications given in the ED-  Medications   sodium chloride 0.9 % bolus infusion 1,000 mL (has no administration in time range)   insulin regular (NOVOLIN R, HUMULIN R) injection 10 Units (has no administration in time range)   sodium chloride 0.9 % bolus infusion 1,000 mL (1,000 mL IntraVENous New Bag 8/6/20 1019)         Medical Decision Making   I am the first provider for this patient. I reviewed the vital signs, available nursing notes, past medical history, past surgical history, family history and social history. Vital Signs-Reviewed the patient's vital signs. Pulse Oximetry Analysis - 98% on room air, not hypoxic    Records Reviewed: Nursing Notes    Provider Notes (Medical Decision Making): Osvaldo Gamez is a 31 y.o. male presenting for skin lesions. Exam concerning for bacterial infection. Patient hemodynamically stable and nontoxic-appearing. Patient does have diabetes. Labs with hyperglycemia without evidence of DKA. Hyperglycemia treated with IV fluids and insulin. Will cover with dual coverage antibiotics for skin lesions as well as topical antibiotics and discharge with early primary care follow-up and strict return precautions. Patient understands and agrees with this plan. Procedures:  Procedures    ED Course:   11:48 AM  Updated patient on all results and plan. All questions answered. 12:37 PM  I did discharge patient reported that he felt like his blood sugar was getting low and he became diaphoretic. Patient's blood sugar checked and was in the 40s. Patient provided with food and juice and will continue to monitor.   Patient states he did eat breakfast this morning. Diagnosis and Disposition     Critical Care: None    DISCHARGE NOTE:    Amro Bloom  results have been reviewed with him. He has been counseled regarding his diagnosis, treatment, and plan. He verbally conveys understanding and agreement of the signs, symptoms, diagnosis, treatment and prognosis and additionally agrees to follow up as discussed. He also agrees with the care-plan and conveys that all of his questions have been answered. I have also provided discharge instructions for him that include: educational information regarding their diagnosis and treatment, and list of reasons why they would want to return to the ED prior to their follow-up appointment, should his condition change. He has been provided with education for proper emergency department utilization. CLINICAL IMPRESSION:    1. Skin infection    2. Hyperglycemia        PLAN:  1. D/C Home  2. Current Discharge Medication List      START taking these medications    Details   cephALEXin (Keflex) 500 mg capsule Take 1 Cap by mouth four (4) times daily for 7 days. Qty: 28 Cap, Refills: 0      doxycycline (MONODOX) 100 mg capsule Take 1 Cap by mouth two (2) times a day for 7 days. Qty: 14 Cap, Refills: 0      mupirocin calcium (BACTROBAN) 2 % topical cream Apply  to affected area two (2) times a day. Qty: 15 g, Refills: 0           3. Follow-up Information     Follow up With Specialties Details Why Gretchen Cartagena MD Family Medicine Schedule an appointment as soon as possible for a visit  [address] [place] [address]  [phone]      [facility] EMERGENCY DEPT Emergency Medicine  If symptoms worsen [address] [id number]  [phone]        _______________________________      Please note that this dictation was completed with [employer] the fake [employer] voice recognition software.   Quite often unanticipated grammatical, syntax, homophones, and other interpretive errors are inadvertently transcribed by the computer software. Please disregard these errors. Please excuse any errors that have escaped final proofreading.

## 2020-08-06 NOTE — DISCHARGE INSTRUCTIONS
Patient Education   Patient Education        Cellulitis: Care Instructions  Your Care Instructions     Cellulitis is a skin infection caused by bacteria, most often strep or staph. It often occurs after a break in the skin from a scrape, cut, bite, or puncture, or after a rash. Cellulitis may be treated without doing tests to find out what caused it. But your doctor may do tests, if needed, to look for a specific bacteria, like methicillin-resistant Staphylococcus aureus (MRSA). The doctor has checked you carefully, but problems can develop later. If you notice any problems or new symptoms, get medical treatment right away. Follow-up care is a key part of your treatment and safety. Be sure to make and go to all appointments, and call your doctor if you are having problems. It's also a good idea to know your test results and keep a list of the medicines you take. How can you care for yourself at home? · Take your antibiotics as directed. Do not stop taking them just because you feel better. You need to take the full course of antibiotics. · Prop up the infected area on pillows to reduce pain and swelling. Try to keep the area above the level of your heart as often as you can. · If your doctor told you how to care for your wound, follow your doctor's instructions. If you did not get instructions, follow this general advice:  ? Wash the wound with clean water 2 times a day. Don't use hydrogen peroxide or alcohol, which can slow healing. ? You may cover the wound with a thin layer of petroleum jelly, such as Vaseline, and a nonstick bandage. ? Apply more petroleum jelly and replace the bandage as needed. · Be safe with medicines. Take pain medicines exactly as directed. ? If the doctor gave you a prescription medicine for pain, take it as prescribed. ? If you are not taking a prescription pain medicine, ask your doctor if you can take an over-the-counter medicine.   To prevent cellulitis in the future  · Try to prevent cuts, scrapes, or other injuries to your skin. Cellulitis most often occurs where there is a break in the skin. · If you get a scrape, cut, mild burn, or bite, wash the wound with clean water as soon as you can to help avoid infection. Don't use hydrogen peroxide or alcohol, which can slow healing. · If you have swelling in your legs (edema), support stockings and good skin care may help prevent leg sores and cellulitis. · Take care of your feet, especially if you have diabetes or other conditions that increase the risk of infection. Wear shoes and socks. Do not go barefoot. If you have athlete's foot or other skin problems on your feet, talk to your doctor about how to treat them. When should you call for help? Call your doctor now or seek immediate medical care if:  · You have signs that your infection is getting worse, such as:  ? Increased pain, swelling, warmth, or redness. ? Red streaks leading from the area. ? Pus draining from the area. ? A fever. · You get a rash. Watch closely for changes in your health, and be sure to contact your doctor if:  · You do not get better as expected. Where can you learn more? Go to http://susi-justin.info/  Enter X309 in the search box to learn more about \"Cellulitis: Care Instructions. \"  Current as of: October 31, 2019               Content Version: 12.5  © 7692-9590 Healthwise, Incorporated. Care instructions adapted under license by [employer] (which disclaims liability or warranty for this information). If you have questions about a medical condition or this instruction, always ask your healthcare professional. [address] any warranty or liability for your use of this information. Learning About High Blood Sugar  What is high blood sugar? Your body turns the food you eat into glucose (sugar), which it uses for energy.  But if your body isn't able to use the sugar right away, it can build up in your blood and lead to high blood sugar. When the amount of sugar in your blood stays too high for too much of the time, you may have diabetes. Diabetes is a disease that can cause serious health problems. The good news is that lifestyle changes may help you get your blood sugar back to normal and avoid or delay diabetes. What causes high blood sugar? Sugar (glucose) can build up in your blood if you:  · Are overweight. · Have a family history of diabetes. · Take certain medicines, such as steroids. What are the symptoms? Having high blood sugar may not cause any symptoms at all. Or it may make you feel very thirsty or very hungry. You may also urinate more often than usual, have blurry vision, or lose weight without trying. How is high blood sugar treated? You can take steps to lower your blood sugar level if you understand what makes it get higher. Your doctor may want you to learn how to test your blood sugar level at home. Then you can see how illness, stress, or different kinds of food or medicine raise or lower your blood sugar level. Other tests may be needed to see if you have diabetes. How can you prevent high blood sugar? · Watch your weight. If you're overweight, losing just a small amount of weight may help. Reducing fat around your waist is most important. · Limit the amount of calories, sweets, and unhealthy fat you eat. Ask your doctor if a dietitian can help you. A registered dietitian can help you create meal plans that fit your lifestyle. · Get at least 30 minutes of exercise on most days of the week. Exercise helps control your blood sugar. It also helps you maintain a healthy weight. Walking is a good choice. You also may want to do other activities, such as running, swimming, cycling, or playing tennis or team sports. · If your doctor prescribed medicines, take them exactly as prescribed. Call your doctor if you think you are having a problem with your medicine. You will get more details on the specific medicines your doctor prescribes. Follow-up care is a key part of your treatment and safety. Be sure to make and go to all appointments, and call your doctor if you are having problems. It's also a good idea to know your test results and keep a list of the medicines you take. Where can you learn more? Go to http://susi-justin.info/  Enter O108 in the search box to learn more about \"Learning About High Blood Sugar. \"  Current as of: December 20, 2019               Content Version: 12.5  © 4094-9541 Healthwise, Incorporated. Care instructions adapted under license by [employer] (which disclaims liability or warranty for this information). If you have questions about a medical condition or this instruction, always ask your healthcare professional. [address] any warranty or liability for your use of this information.

## 2020-08-06 NOTE — ED NOTES
Patient states he is feeling much better.  He informed RN that he had taken his insulin and not eaten however he informed Dr. Corin Corrigan he had eaten

## 2020-08-06 NOTE — LETTER
Jazmín Mao was seen and treated in our emergency department on 8/6/2020. He may return to work on 08/08/2020 If you have any questions or concerns, please don't hesitate to call.  
 
 
Dr. Abdi Rucker

## 2020-11-18 ENCOUNTER — APPOINTMENT (OUTPATIENT)
Dept: GENERAL RADIOLOGY | Age: 25
End: 2020-11-18
Attending: EMERGENCY MEDICINE
Payer: MEDICAID

## 2020-11-18 ENCOUNTER — HOSPITAL ENCOUNTER (EMERGENCY)
Age: 25
Discharge: HOME | End: 2020-11-18
Attending: EMERGENCY MEDICINE
Payer: MEDICAID

## 2020-11-18 VITALS
SYSTOLIC BLOOD PRESSURE: 121 MMHG | HEART RATE: 106 BPM | WEIGHT: 165 LBS | BODY MASS INDEX: 23.1 KG/M2 | TEMPERATURE: 97.5 F | DIASTOLIC BLOOD PRESSURE: 72 MMHG | HEIGHT: 71 IN | RESPIRATION RATE: 17 BRPM | OXYGEN SATURATION: 100 %

## 2020-11-18 DIAGNOSIS — S93.402A SPRAIN OF LEFT ANKLE, UNSPECIFIED LIGAMENT, INITIAL ENCOUNTER: ICD-10-CM

## 2020-11-18 DIAGNOSIS — R56.9 SEIZURE-LIKE ACTIVITY (HCC): ICD-10-CM

## 2020-11-18 DIAGNOSIS — E16.2 HYPOGLYCEMIA: Primary | ICD-10-CM

## 2020-11-18 DIAGNOSIS — E10.10 TYPE 1 DIABETES MELLITUS WITH KETOACIDOSIS WITHOUT COMA (HCC): ICD-10-CM

## 2020-11-18 LAB
ALBUMIN SERPL-MCNC: 4.2 G/DL (ref 3.4–5)
ALBUMIN/GLOB SERPL: 1.3 {RATIO} (ref 0.8–1.7)
ALP SERPL-CCNC: 81 U/L (ref 45–117)
ALT SERPL-CCNC: 37 U/L (ref 16–61)
ANION GAP SERPL CALC-SCNC: 11 MMOL/L (ref 3–18)
AST SERPL-CCNC: 30 U/L (ref 10–38)
ATRIAL RATE: 105 BPM
BASOPHILS # BLD: 0 K/UL (ref 0–0.1)
BASOPHILS NFR BLD: 0 % (ref 0–2)
BILIRUB SERPL-MCNC: 1 MG/DL (ref 0.2–1)
BUN SERPL-MCNC: 14 MG/DL (ref 7–18)
BUN/CREAT SERPL: 14 (ref 12–20)
CALCIUM SERPL-MCNC: 9.4 MG/DL (ref 8.5–10.1)
CALCULATED P AXIS, ECG09: 56 DEGREES
CALCULATED R AXIS, ECG10: 56 DEGREES
CALCULATED T AXIS, ECG11: 18 DEGREES
CHLORIDE SERPL-SCNC: 105 MMOL/L (ref 100–111)
CO2 SERPL-SCNC: 25 MMOL/L (ref 21–32)
CREAT SERPL-MCNC: 1.03 MG/DL (ref 0.6–1.3)
DIAGNOSIS, 93000: NORMAL
DIFFERENTIAL METHOD BLD: ABNORMAL
EOSINOPHIL # BLD: 0 K/UL (ref 0–0.4)
EOSINOPHIL NFR BLD: 0 % (ref 0–5)
ERYTHROCYTE [DISTWIDTH] IN BLOOD BY AUTOMATED COUNT: 12.5 % (ref 11.6–14.5)
GLOBULIN SER CALC-MCNC: 3.3 G/DL (ref 2–4)
GLUCOSE BLD STRIP.AUTO-MCNC: 69 MG/DL (ref 70–110)
GLUCOSE BLD STRIP.AUTO-MCNC: 71 MG/DL (ref 70–110)
GLUCOSE BLD STRIP.AUTO-MCNC: 95 MG/DL (ref 70–110)
GLUCOSE SERPL-MCNC: 47 MG/DL (ref 74–99)
HCT VFR BLD AUTO: 46.7 % (ref 36–48)
HGB BLD-MCNC: 16.6 G/DL (ref 13–16)
LYMPHOCYTES # BLD: 2.3 K/UL (ref 0.9–3.6)
LYMPHOCYTES NFR BLD: 26 % (ref 21–52)
MAGNESIUM SERPL-MCNC: 2.9 MG/DL (ref 1.6–2.6)
MCH RBC QN AUTO: 30.5 PG (ref 24–34)
MCHC RBC AUTO-ENTMCNC: 35.5 G/DL (ref 31–37)
MCV RBC AUTO: 85.7 FL (ref 74–97)
MONOCYTES # BLD: 1 K/UL (ref 0.05–1.2)
MONOCYTES NFR BLD: 11 % (ref 3–10)
NEUTS SEG # BLD: 5.6 K/UL (ref 1.8–8)
NEUTS SEG NFR BLD: 63 % (ref 40–73)
P-R INTERVAL, ECG05: 174 MS
PLATELET # BLD AUTO: 287 K/UL (ref 135–420)
PMV BLD AUTO: 11.1 FL (ref 9.2–11.8)
POTASSIUM SERPL-SCNC: 3.2 MMOL/L (ref 3.5–5.5)
PROT SERPL-MCNC: 7.5 G/DL (ref 6.4–8.2)
Q-T INTERVAL, ECG07: 346 MS
QRS DURATION, ECG06: 100 MS
QTC CALCULATION (BEZET), ECG08: 457 MS
RBC # BLD AUTO: 5.45 M/UL (ref 4.7–5.5)
SODIUM SERPL-SCNC: 141 MMOL/L (ref 136–145)
VENTRICULAR RATE, ECG03: 105 BPM
WBC # BLD AUTO: 9 K/UL (ref 4.6–13.2)

## 2020-11-18 PROCEDURE — 99285 EMERGENCY DEPT VISIT HI MDM: CPT

## 2020-11-18 PROCEDURE — 93005 ELECTROCARDIOGRAM TRACING: CPT

## 2020-11-18 PROCEDURE — 96375 TX/PRO/DX INJ NEW DRUG ADDON: CPT

## 2020-11-18 PROCEDURE — 96376 TX/PRO/DX INJ SAME DRUG ADON: CPT

## 2020-11-18 PROCEDURE — 96365 THER/PROPH/DIAG IV INF INIT: CPT

## 2020-11-18 PROCEDURE — 74011250636 HC RX REV CODE- 250/636: Performed by: EMERGENCY MEDICINE

## 2020-11-18 PROCEDURE — 83735 ASSAY OF MAGNESIUM: CPT

## 2020-11-18 PROCEDURE — 74011000258 HC RX REV CODE- 258: Performed by: EMERGENCY MEDICINE

## 2020-11-18 PROCEDURE — 80053 COMPREHEN METABOLIC PANEL: CPT

## 2020-11-18 PROCEDURE — 2709999900 HC NON-CHARGEABLE SUPPLY

## 2020-11-18 PROCEDURE — 85025 COMPLETE CBC W/AUTO DIFF WBC: CPT

## 2020-11-18 PROCEDURE — 82962 GLUCOSE BLOOD TEST: CPT

## 2020-11-18 PROCEDURE — 73610 X-RAY EXAM OF ANKLE: CPT

## 2020-11-18 RX ORDER — ONDANSETRON 2 MG/ML
4 INJECTION INTRAMUSCULAR; INTRAVENOUS
Status: COMPLETED | OUTPATIENT
Start: 2020-11-18 | End: 2020-11-18

## 2020-11-18 RX ORDER — KETOROLAC TROMETHAMINE 30 MG/ML
15 INJECTION, SOLUTION INTRAMUSCULAR; INTRAVENOUS ONCE
Status: COMPLETED | OUTPATIENT
Start: 2020-11-18 | End: 2020-11-18

## 2020-11-18 RX ORDER — DEXTROSE MONOHYDRATE 100 MG/ML
125-250 INJECTION, SOLUTION INTRAVENOUS ONCE
Status: COMPLETED | OUTPATIENT
Start: 2020-11-18 | End: 2020-11-18

## 2020-11-18 RX ADMIN — SODIUM CHLORIDE 1000 ML: 900 INJECTION, SOLUTION INTRAVENOUS at 19:29

## 2020-11-18 RX ADMIN — KETOROLAC TROMETHAMINE 15 MG: 30 INJECTION, SOLUTION INTRAMUSCULAR at 21:25

## 2020-11-18 RX ADMIN — DEXTROSE MONOHYDRATE 125 ML: 100 INJECTION, SOLUTION INTRAVENOUS at 19:29

## 2020-11-18 RX ADMIN — ONDANSETRON 4 MG: 2 INJECTION INTRAMUSCULAR; INTRAVENOUS at 19:30

## 2020-11-19 NOTE — DISCHARGE INSTRUCTIONS
You were seen and evaluated in the Emergency Department. Please understand that your work up is not all encompassing and you should follow up with your primary care physician for further management and continuity of care. Please return to Emergency Department or seek medical attention immediately if you have acute worsening in your symptoms or develop chest pain, shortness of breath, repeated vomiting, fever, altered level of consciousness, coughing up blood, or start sweating and feel clammy. If you were prescribed any medicine for home, please take as prescribed by your health-care provider. If you were given any follow-up appointments or numbers to call, please do so as instructed. Avoid any tobacco products or excessive alcohol. Patient Education        Ankle Sprain: Care Instructions  Your Care Instructions     An ankle sprain can happen when you twist your ankle. The ligaments that support the ankle can get stretched and torn. Often the ankle is swollen and painful. Ankle sprains may take from several weeks to several months to heal. Usually, the more pain and swelling you have, the more severe your ankle sprain is and the longer it will take to heal. You can heal faster and regain strength in your ankle with good home treatment. It is very important to give your ankle time to heal completely, so that you do not easily hurt your ankle again. Follow-up care is a key part of your treatment and safety. Be sure to make and go to all appointments, and call your doctor if you are having problems. It's also a good idea to know your test results and keep a list of the medicines you take. How can you care for yourself at home? · Prop up your foot on pillows as much as possible for the next 3 days. Try to keep your ankle above the level of your heart. This will help reduce the swelling. · Follow your doctor's directions for wearing a splint or elastic bandage.  Wrapping the ankle may help reduce or prevent swelling. · Your doctor may give you a splint, a brace, an air stirrup, or another form of ankle support to protect your ankle until it is healed. Wear it as directed while your ankle is healing. Do not remove it unless your doctor tells you to. After your ankle has healed, ask your doctor whether you should wear the brace when you exercise. · Put ice or cold packs on your injured ankle for 10 to 20 minutes at a time. Try to do this every 1 to 2 hours for the next 3 days (when you are awake) or until the swelling goes down. Put a thin cloth between the ice and your skin. · You may need to use crutches until you can walk without pain. If you do use crutches, try to bear some weight on your injured ankle if you can do so without pain. This helps the ankle heal.  · Take pain medicines exactly as directed. ? If the doctor gave you a prescription medicine for pain, take it as prescribed. ? If you are not taking a prescription pain medicine, ask your doctor if you can take an over-the-counter medicine. · If you have been given ankle exercises to do at home, do them exactly as instructed. These can promote healing and help prevent lasting weakness. When should you call for help? Call your doctor now or seek immediate medical care if:    · Your pain is getting worse.     · Your swelling is getting worse.     · Your splint feels too tight or you are unable to loosen it. Watch closely for changes in your health, and be sure to contact your doctor if:    · You are not getting better after 1 week. Where can you learn more? Go to http://www.gray.com/  Enter X503 in the search box to learn more about \"Ankle Sprain: Care Instructions. \"  Current as of: March 2, 2020               Content Version: 12.6  © 0479-5286 [employer]. Care instructions adapted under license by [employer] (which disclaims liability or warranty for this information).  If you have questions about a medical condition or this instruction, always ask your healthcare professional. [address] any warranty or liability for your use of this information. Patient Education        Learning About RICE (Rest, Ice, Compression, and Elevation)  What is RICE? RICE is a way to care for an injury. RICE helps relieve pain and swelling. It may also help with healing and flexibility. RICE stands for:  · R est and protect the injured or sore area. · I ce or a cold pack used as soon as possible. · C ompression, or wrapping the injured or sore area with an elastic bandage. · E levation (propping up) the injured or sore area. How do you do RICE? You can use RICE for home treatment when you have general aches and pains or after an injury or surgery. Rest  · Do not put weight on the injury for at least 24 to 48 hours. · Use crutches for a badly sprained knee or ankle. · Support a sprained wrist, elbow, or shoulder with a sling. Ice  · Put ice or a cold pack on the injury right away to reduce pain and swelling. Frozen vegetables will also work as an ice pack. Put a thin cloth between the ice or cold pack and your skin. The cloth protects the injured area from getting too cold. · Use ice for 10 to 15 minutes at a time for the first 48 to 72 hours. Compression  · Use compression for sprains, strains, and surgeries of the arms and legs. · Wrap the injured area with an elastic bandage or compression sleeve to reduce swelling. · Don't wrap it too tightly. If the area below it feels numb, tingles, or feels cool, loosen the wrap. Elevation  · Use elevation for areas of the body that can be propped up, such as arms and legs. · Prop up the injured area on pillows whenever you use ice. Keep it propped up anytime you sit or lie down. · Try to keep the injured area at or above the level of your heart. This will help reduce swelling and bruising. Where can you learn more?   Go to http://www.gray.com/  Enter Y855 in the search box to learn more about \"Learning About RICE (Rest, Ice, Compression, and Elevation). \"  Current as of: March 2, 2020               Content Version: 12.6  © 1573-8409 [employer]. Care instructions adapted under license by [employer] (which disclaims liability or warranty for this information). If you have questions about a medical condition or this instruction, always ask your healthcare professional. [address] any warranty or liability for your use of this information. Patient Education        Learning About Low Blood Sugar (Hypoglycemia) in Diabetes  What is low blood sugar (hypoglycemia)? Hypoglycemia means that your blood sugar is low and your body (especially your brain) is not getting enough fuel. If you have diabetes, your blood sugar can go too low if you take too much of some diabetes medicines. It can also go too low if you miss a meal. And it can happen if you exercise too hard without eating enough food. Some medicines used to treat other health problems can cause low blood sugar too. What are the symptoms? Symptoms of low blood sugar can start quickly. It may take just 10 to 15 minutes. If you have had diabetes for many years, you may not realize that your blood sugar is low until it drops very low. · If your blood sugar level drops below 70 (mild low blood sugar), you may feel tired, anxious, dizzy, weak, shaky, or sweaty. You may have a fast heartbeat or blurry vision. · If your blood sugar level continues to drop (usually below 40), your behavior may change. You may feel more irritable. You may find it hard to concentrate or talk. And you may feel unsteady when you stand or walk. You may become too weak or confused to eat something with sugar to raise your blood sugar level.   · If your blood sugar level drops very low (usually below 20), you may pass out (lose consciousness). Or you may have a seizure or stroke. If you have symptoms of severe low blood sugar, you need to get medical care right away. If you had a low blood sugar level during the night, you may wake up tired or with a headache. Or you may sweat so much during the night that your pajamas or sheets are damp when you wake up. How is low blood sugar treated? You can treat low blood sugar by eating or drinking something that has 15 grams of carbohydrate. These should be quick-sugar foods. Check your blood sugar level again 15 minutes after having a quick-sugar food to make sure your level is getting back to your target range. Children usually need less than 15 grams of carbohydrate. Check with your doctor or diabetes educator for the amount that is right for your child. Here are examples of quick-sugar foods that have 15 grams of carbohydrate:  · 3 to 4 glucose tablets  · 1 tablespoon (3 teaspoons) of table sugar  · 1 tablespoon (3 teaspoons) honey  · ½ cup to ¾ cup (4 to 6 ounces) of fruit juice or regular (not diet) soda  · Hard candy (such as 6 Life Savers)  If you have problems with severe low blood sugar, someone else may have to give you a shot of glucagon. This is a hormone that raises blood sugar levels quickly. How can you prevent low blood sugar? You can take steps to prevent low blood sugar. · Follow your treatment plan. Take your insulin or other diabetes medicine exactly as your doctor prescribed it. Talk with your doctor if you're having low blood sugar often. Your medicine may need to be adjusted if it's causing your low blood sugar. · Check your blood sugar levels often. This helps you find early changes before an emergency happens. · Keep a quick-sugar food with you in case your blood sugar level drops low. · Eat small meals more often so that you don't get too hungry between meals. Don't skip meals. · Balance extra exercise with eating more.  Check your blood sugar and learn how it changes after exercise. If your blood sugar stays at a normal level, you may not need to eat after you exercise. · Limit how much alcohol you drink. Alcohol can make low blood sugar go even lower. Don't drink alcohol if you have problems recognizing the early signs of low blood sugar. · Keep a diary of your symptoms. This helps you learn when changes in your body may signal low blood sugar. And keep track of how often you have low blood sugar, including when you last ate and what you ate. This will help you learn what causes your blood sugar to drop. · Learn about diabetes and low blood sugar. Support groups or a diabetes education center can help you understand how medicines, diet, and exercise affect your blood sugar levels. Since low blood sugar levels can quickly become an emergency, be sure to wear medical alert jewelry, such as a medical alert bracelet. This is to let people know you have diabetes so they can get help for you. You can buy this at most drugstores. And make sure your family, friends, and coworkers know the symptoms of low blood sugar. Teach them what to do to get your sugar level up. Follow-up care is a key part of your treatment and safety. Be sure to make and go to all appointments, and call your doctor if you are having problems. It's also a good idea to know your test results and keep a list of the medicines you take. Where can you learn more? Go to http://www.gray.com/  Enter T511 in the search box to learn more about \"Learning About Low Blood Sugar (Hypoglycemia) in Diabetes. \"  Current as of: December 20, 2019               Content Version: 12.6  © 0075-8193 [employer]. Care instructions adapted under license by [employer] (which disclaims liability or warranty for this information).  If you have questions about a medical condition or this instruction, always ask your healthcare professional. [place] [address] any warranty or liability for your use of this information.

## 2020-11-19 NOTE — ED TRIAGE NOTES
PT reports to ED via EMS for seizure and hypoglycemia. EMS reports that they got called to his home at 1811 and he was post seizure. They state that he has hx of seizures but only when his glucose is low. Ems report when they arrived his BGL was 51, they gave him a whole tube of oral glucose, it came up to 70 and then dropped back down to 40. They report they gave him an ampule of D50 IV and his last BGL was 149. Upon arrival he is ANOX4 and complaining of left ankle pain and nausea.

## 2020-11-19 NOTE — ED NOTES
Blood sugar 69 Dr Gabriel Zendejas notified. Order to give pt juice. Pt drank orange juice then vomited juice. Dr Gabriel Zendejas notified. D10w fluids ordered.

## 2020-11-19 NOTE — ED NOTES
Pt alert and oriented. Pain slightly relieved from toradol. Pt given written and verbal d/c instructions. Pt ambulated from ED on crutches w/o difficulty.

## 2020-11-19 NOTE — ED PROVIDER NOTES
EMERGENCY DEPARTMENT HISTORY AND PHYSICAL EXAM    Date: 11/18/2020  Patient Name: Luciana Durham    History of Presenting Illness     Chief Complaint   Patient presents with    Seizure         History Provided By: Patient and patient's mother    Additional History (Context):   Luciana Durham is a 31 y.o. male with PMHX insulin-dependent diabetes presents to the emergency department via ambulance C/O seizure-like activity witnessed by family members at home. On my assessment, patient states that he does not remember what happens. Mom is at bedside. Patient and mother reports that he experiences seizures when his blood glucose is low. EMS states that when they arrived he noticed that his blood glucose was 50. They gave him oral glucose however did not notice that he had his insulin pump on and although his glucose to increase him to 149, and decrease current to 40. Patient since then has discontinued his insulin pump. Mom is unsure exactly how long the seizure lasted for. Patient complained of nausea and was given 8 mg of Zofran. States that nausea sometimes accompany his seizure. Was not given any benzodiazepines stop the seizures. There has been no changes to his insulin regimen. However mom states that the patient started a new job and the stress can sometimes cause his glucose to be low. Pt denies any recent illness including fever, diarrhea, chest pain, abdominal pain, and any other sxs or complaints. He is also reporting left ankle pain from the seizure. PCP: Renea Grant MD    Current Facility-Administered Medications   Medication Dose Route Frequency Provider Last Rate Last Dose    ketorolac (TORADOL) injection 15 mg  15 mg IntraVENous ONCE Jaime Wahl, DO         Current Outpatient Medications   Medication Sig Dispense Refill    mupirocin calcium (BACTROBAN) 2 % topical cream Apply  to affected area two (2) times a day.  15 g 0    ondansetron hcl (Zofran) 4 mg tablet Take 4 mg by mouth every eight (8) hours as needed for Nausea or Nausea or Vomiting.  methocarbamol (ROBAXIN-750) 750 mg tablet Take 1 Tab by mouth four (4) times daily. 12 Tab 0    insulin syringe-needle U-100 (BD INSULIN SYRINGE SAFETYGLIDE) 1 mL 29 gauge x 1/2\" syrg 1 Syringe by Does Not Apply route Before breakfast, lunch, and dinner. 30 Syringe 0    lancets-blood glucose strips 30 gauge cmpk 1 Each by Does Not Apply route four (4) times daily. 1 Dose Pack 0    insulin lispro (HUMALOG U-100 INSULIN) 100 unit/mL injection Use as directed with insulin pump 1 Vial 0    Blood-Glucose Meter monitoring kit Use as directed. 1 Kit 0    fluoxetine HCl (PROZAC PO) Take 20 mg by mouth daily.  RANITIDINE HCL PO Take  by mouth.  promethazine HCl (PROMETHAZINE PO) Take  by mouth.  AMPHET ASP/AMPHET/D-AMPHET (ADDERALL PO) Take 30 mg by mouth two (2) times a day.   insulin pump (PATIENT SUPPLIED) Misc by SubCUTAneous route as needed. Past History     Past Medical History:  Past Medical History:   Diagnosis Date    ADD (attention deficit disorder)     Diabetes ([facility] [facility]     Insomnia     Seizure ([facility] [address]        Past Surgical History:  History reviewed. No pertinent surgical history. Family History:  History reviewed. No pertinent family history. Social History:  Social History     Tobacco Use    Smoking status: Never Smoker    Smokeless tobacco: Former User   Substance Use Topics    Alcohol use: Yes     Alcohol/week: 16.0 standard drinks     Types: 10 Cans of beer, 6 Shots of liquor per week    Drug use: Not Currently       Allergies:  No Known Allergies      Review of Systems   Review of Systems   Constitutional: Negative for chills and fever. HENT: Negative for congestion, ear pain, sinus pain and sore throat. Eyes: Negative for pain and visual disturbance. Respiratory: Negative for cough and shortness of breath.     Cardiovascular: Negative for chest pain and leg swelling. Gastrointestinal: Negative for abdominal pain, constipation, diarrhea, nausea and vomiting. Genitourinary: Negative for dysuria and hematuria. Musculoskeletal: Positive for arthralgias. Negative for back pain and neck pain. Skin: Negative for pallor and rash. Neurological: Positive for seizures. Negative for dizziness, tremors, weakness, light-headedness and headaches. All other systems reviewed and are negative. Physical Exam     Vitals:    11/18/20 1904 11/18/20 1911   BP: 119/65    Pulse: (!) 114    Resp: 17    Temp: 97.5 °F (36.4 °C)    SpO2: 98% 97%   Weight: 74.8 kg (165 lb)    Height: 5' 11\" (1.803 m)      Physical Exam    Nursing note and vitals reviewed    Constitutional: Cecily Gonzalez  male, no acute distress  Head: Normocephalic, Atraumatic  Eyes: Pupils are equal, round, and reactive to light, EOMI  Neck: Supple, non-tender  Cardiovascular: Regular rate and rhythm, no murmurs, rubs, or gallops, + 2 radial pulses bilaterally  Chest: Normal work of breathing and chest excursion bilaterally  Lungs: Clear to ausculation bilaterally, no wheezes, no rhonchi  Abdomen: Soft, non tender, non distended, normoactive bowel sounds  Back: No evidence of trauma or deformity  Extremities: Left ankle with no obvious swelling, ecchymosis or erythema. There is no crepitus. However he has tenderness over the lateral portion of his ankle. +2 DP pulse.   No streaking erythema, vesicular lesions, ulcerations or bulla  Skin: Warm and dry, normal cap refill  Neuro: Alert and appropriate, CN intact, normal speech, moving all 4 extremities freely and symmetrically  Psychiatric: Normal mood and affect       Diagnostic Study Results     Labs -     Recent Results (from the past 12 hour(s))   GLUCOSE, POC    Collection Time: 11/18/20  7:08 PM   Result Value Ref Range    Glucose (POC) 69 (L) 70 - 110 mg/dL   CBC WITH AUTOMATED DIFF    Collection Time: 11/18/20  7:30 PM   Result Value Ref Range    WBC 9.0 4.6 - 13.2 K/uL    RBC 5.45 4.70 - 5.50 M/uL    HGB 16.6 (H) 13.0 - 16.0 g/dL    HCT 46.7 36.0 - 48.0 %    MCV 85.7 74.0 - 97.0 FL    MCH 30.5 24.0 - 34.0 PG    MCHC 35.5 31.0 - 37.0 g/dL    RDW 12.5 11.6 - 14.5 %    PLATELET 855 296 - 453 K/uL    MPV 11.1 9.2 - 11.8 FL    NEUTROPHILS 63 40 - 73 %    LYMPHOCYTES 26 21 - 52 %    MONOCYTES 11 (H) 3 - 10 %    EOSINOPHILS 0 0 - 5 %    BASOPHILS 0 0 - 2 %    ABS. NEUTROPHILS 5.6 1.8 - 8.0 K/UL    ABS. LYMPHOCYTES 2.3 0.9 - 3.6 K/UL    ABS. MONOCYTES 1.0 0.05 - 1.2 K/UL    ABS. EOSINOPHILS 0.0 0.0 - 0.4 K/UL    ABS. BASOPHILS 0.0 0.0 - 0.1 K/UL    DF AUTOMATED     METABOLIC PANEL, COMPREHENSIVE    Collection Time: 11/18/20  7:30 PM   Result Value Ref Range    Sodium 141 136 - 145 mmol/L    Potassium 3.2 (L) 3.5 - 5.5 mmol/L    Chloride 105 100 - 111 mmol/L    CO2 25 21 - 32 mmol/L    Anion gap 11 3.0 - 18 mmol/L    Glucose 47 (LL) 74 - 99 mg/dL    BUN 14 7.0 - 18 MG/DL    Creatinine 1.03 0.6 - 1.3 MG/DL    BUN/Creatinine ratio 14 12 - 20      GFR est AA >60 >60 ml/min/1.73m2    GFR est non-AA >60 >60 ml/min/1.73m2    Calcium 9.4 8.5 - 10.1 MG/DL    Bilirubin, total 1.0 0.2 - 1.0 MG/DL    ALT (SGPT) 37 16 - 61 U/L    AST (SGOT) 30 10 - 38 U/L    Alk.  phosphatase 81 45 - 117 U/L    Protein, total 7.5 6.4 - 8.2 g/dL    Albumin 4.2 3.4 - 5.0 g/dL    Globulin 3.3 2.0 - 4.0 g/dL    A-G Ratio 1.3 0.8 - 1.7     MAGNESIUM    Collection Time: 11/18/20  7:30 PM   Result Value Ref Range    Magnesium 2.9 (H) 1.6 - 2.6 mg/dL   EKG, 12 LEAD, INITIAL    Collection Time: 11/18/20  7:41 PM   Result Value Ref Range    Ventricular Rate 105 BPM    Atrial Rate 105 BPM    P-R Interval 174 ms    QRS Duration 100 ms    Q-T Interval 346 ms    QTC Calculation (Bezet) 457 ms    Calculated P Axis 56 degrees    Calculated R Axis 56 degrees    Calculated T Axis 18 degrees    Diagnosis       Sinus tachycardia  Nonspecific ST and T wave abnormality  Abnormal ECG  When compared with ECG of 31-MAR-2019 21:37,  Nonspecific T wave abnormality now evident in Lateral leads     GLUCOSE, POC    Collection Time: 11/18/20  8:05 PM   Result Value Ref Range    Glucose (POC) 71 70 - 110 mg/dL       Radiologic Studies -   XR ANKLE LT MIN 3 V   Final Result   IMPRESSION:      Negative radiographic examination. _______________                          CT Results  (Last 48 hours)    None        CXR Results  (Last 48 hours)    None            Medical Decision Making   I am the first provider for this patient. I reviewed the vital signs, available nursing notes, past medical history, past surgical history, family history and social history. Vital Signs-Reviewed the patient's vital signs. Pulse Oximetry Analysis -97% on room air    Cardiac Monitor:  Rate: 114 bpm  Rhythm: Regular    EKG interpretation: (Preliminary)  7:08 PM   Sinus tachycardia 105 bpm.  IN interval 174 ms.  ms. QTc 457 ms. No acute ST elevation    Records Reviewed: Nursing Notes and Old Medical Records    Provider Notes:   31 y.o. male with a history of insulin-dependent diabetes who presents with seizure-like activity. Was found to be hyperglycemic. On exam patient is awake and alert. He is mildly tachycardic however nontoxic in appearance. He is afebrile, normotensive. In no respiratory distress, saturating 97% on room air. Recheck of his blood sugar was 69. Prior to the giving juice, patient had another episode of emesis. Patient given another dose of Zofran and will be given D10 as he is having difficulty in tolerating p.o. Will evaluate for any other metabolic derangements that may explain his seizure. Will observe in the emergency department until patient is back to his baseline. Procedures:  Procedures    ED Course:   7:08 PM   Initial assessment performed. The patients presenting problems have been discussed, and they are in agreement with the care plan formulated and outlined with them.   I have encouraged them to ask questions as they arise throughout their visit.    9:01 PM  Patient's CBC showing hemoglobin 16.6. No leukocytosis or bandemia. Initial CMP drawn prior to the patient receiving dextrose was 40. Glucose after receiving a 71. Patient drinking juice without vomiting. On reassessment, patient reports improved symptoms. Ankle x-ray showing no acute process. Likely ankle sprain. Will be placed in Ace wrap and provided crutches for symptom control. Patient states that he feels back to his baseline. Does report that he may have accidentally given himself more insulin than he usually does. Diagnosis and Disposition       DISCHARGE NOTE:  9:00 PM    Pro Valenzuela  results have been reviewed with him. He has been counseled regarding his diagnosis, treatment, and plan. He verbally conveys understanding and agreement of the signs, symptoms, diagnosis, treatment and prognosis and additionally agrees to follow up as discussed. He also agrees with the care-plan and conveys that all of his questions have been answered. I have also provided discharge instructions for him that include: educational information regarding their diagnosis and treatment, and list of reasons why they would want to return to the ED prior to their follow-up appointment, should his condition change. He has been provided with education for proper emergency department utilization. CLINICAL IMPRESSION:    1. Hypoglycemia    2. Sprain of left ankle, unspecified ligament, initial encounter    3. Seizure-like activity ([address]    4. Type 1 diabetes mellitus with ketoacidosis without coma ([address]        PLAN:  1. D/C Home  2. Current Discharge Medication List        3.    Follow-up Information     Follow up With Specialties Details Why Contact Info    Giacomo Boo MD Family Medicine Schedule an appointment as soon as possible for a visit in 2 days  [address] [facility]  [address] [facility] Refugio Brar, MD Neurology Schedule an appointment as soon as possible for a visit in 2 days  [address]  [address]      Wilfrid Swanson MD Orthopedic Surgery Schedule an appointment as soon as possible for a visit in 2 days  [address]      [facility] EMERGENCY DEPT Emergency Medicine  As needed if symptoms worsen [address] [address]  [phone]        ____________________________________     Please note that this dictation was completed with [employer], the computer voice recognition software. Quite often unanticipated grammatical, syntax, homophones, and other interpretive errors are inadvertently transcribed by the computer software. Please disregard these errors. Please excuse any errors that have escaped final proofreading.

## 2022-03-19 PROBLEM — E10.10 DKA, TYPE 1 (HCC): Status: ACTIVE | Noted: 2020-04-06

## 2022-03-19 PROBLEM — E10.9 TYPE I DIABETES MELLITUS (HCC): Status: ACTIVE | Noted: 2019-04-01

## 2022-03-20 PROBLEM — F10.10 ALCOHOL ABUSE: Status: ACTIVE | Noted: 2020-04-06

## 2023-06-06 ENCOUNTER — HOSPITAL ENCOUNTER (EMERGENCY)
Facility: HOSPITAL | Age: 28
Discharge: HOME | End: 2023-06-06
Attending: EMERGENCY MEDICINE
Payer: MEDICAID

## 2023-06-06 ENCOUNTER — APPOINTMENT (OUTPATIENT)
Facility: HOSPITAL | Age: 28
End: 2023-06-06
Payer: MEDICAID

## 2023-06-06 VITALS
OXYGEN SATURATION: 99 % | RESPIRATION RATE: 16 BRPM | HEIGHT: 72 IN | BODY MASS INDEX: 23.03 KG/M2 | DIASTOLIC BLOOD PRESSURE: 96 MMHG | TEMPERATURE: 97.3 F | SYSTOLIC BLOOD PRESSURE: 135 MMHG | HEART RATE: 90 BPM | WEIGHT: 170 LBS

## 2023-06-06 DIAGNOSIS — E11.65 HYPERGLYCEMIA DUE TO DIABETES MELLITUS (HCC): Primary | ICD-10-CM

## 2023-06-06 LAB
ALBUMIN SERPL-MCNC: 4.5 G/DL (ref 3.4–5)
ALBUMIN/GLOB SERPL: 1.4 (ref 0.8–1.7)
ALP SERPL-CCNC: 84 U/L (ref 45–117)
ALT SERPL-CCNC: 29 U/L (ref 16–61)
ANION GAP SERPL CALC-SCNC: 4 MMOL/L (ref 3–18)
APPEARANCE UR: CLEAR
AST SERPL-CCNC: 14 U/L (ref 10–38)
BASOPHILS # BLD: 0 K/UL (ref 0–0.1)
BASOPHILS NFR BLD: 0 % (ref 0–2)
BILIRUB SERPL-MCNC: 1.5 MG/DL (ref 0.2–1)
BILIRUB UR QL: NEGATIVE
BUN SERPL-MCNC: 18 MG/DL (ref 7–18)
BUN/CREAT SERPL: 15 (ref 12–20)
CALCIUM SERPL-MCNC: 9.9 MG/DL (ref 8.5–10.1)
CHLORIDE SERPL-SCNC: 100 MMOL/L (ref 100–111)
CHP ED QC CHECK: YES
CO2 SERPL-SCNC: 30 MMOL/L (ref 21–32)
COLOR UR: YELLOW
CREAT SERPL-MCNC: 1.18 MG/DL (ref 0.6–1.3)
DIFFERENTIAL METHOD BLD: ABNORMAL
EOSINOPHIL # BLD: 0 K/UL (ref 0–0.4)
EOSINOPHIL NFR BLD: 0 % (ref 0–5)
ERYTHROCYTE [DISTWIDTH] IN BLOOD BY AUTOMATED COUNT: 12 % (ref 11.6–14.5)
GLOBULIN SER CALC-MCNC: 3.3 G/DL (ref 2–4)
GLUCOSE BLD STRIP.AUTO-MCNC: 159 MG/DL (ref 70–110)
GLUCOSE BLD-MCNC: 493 MG/DL
GLUCOSE SERPL-MCNC: 457 MG/DL (ref 74–99)
GLUCOSE UR STRIP.AUTO-MCNC: >1000 MG/DL
HCT VFR BLD AUTO: 46.1 % (ref 36–48)
HGB BLD-MCNC: 16.2 G/DL (ref 13–16)
HGB UR QL STRIP: NEGATIVE
IMM GRANULOCYTES # BLD AUTO: 0 K/UL (ref 0–0.04)
IMM GRANULOCYTES NFR BLD AUTO: 0 % (ref 0–0.5)
KETONES UR QL STRIP.AUTO: 40 MG/DL
LEUKOCYTE ESTERASE UR QL STRIP.AUTO: NEGATIVE
LYMPHOCYTES # BLD: 1.3 K/UL (ref 0.9–3.6)
LYMPHOCYTES NFR BLD: 25 % (ref 21–52)
MCH RBC QN AUTO: 29 PG (ref 24–34)
MCHC RBC AUTO-ENTMCNC: 35.1 G/DL (ref 31–37)
MCV RBC AUTO: 82.5 FL (ref 78–100)
MONOCYTES # BLD: 0.4 K/UL (ref 0.05–1.2)
MONOCYTES NFR BLD: 7 % (ref 3–10)
NEUTS SEG # BLD: 3.4 K/UL (ref 1.8–8)
NEUTS SEG NFR BLD: 67 % (ref 40–73)
NITRITE UR QL STRIP.AUTO: NEGATIVE
NRBC # BLD: 0 K/UL (ref 0–0.01)
NRBC BLD-RTO: 0 PER 100 WBC
PH UR STRIP: 6 (ref 5–8)
PLATELET # BLD AUTO: 222 K/UL (ref 135–420)
PMV BLD AUTO: 10.7 FL (ref 9.2–11.8)
POTASSIUM SERPL-SCNC: 4.4 MMOL/L (ref 3.5–5.5)
PROT SERPL-MCNC: 7.8 G/DL (ref 6.4–8.2)
PROT UR STRIP-MCNC: NEGATIVE MG/DL
RBC # BLD AUTO: 5.59 M/UL (ref 4.35–5.65)
SODIUM SERPL-SCNC: 134 MMOL/L (ref 136–145)
SP GR UR REFRACTOMETRY: >1.03 (ref 1–1.03)
UROBILINOGEN UR QL STRIP.AUTO: 0.2 EU/DL (ref 0.2–1)
WBC # BLD AUTO: 5.1 K/UL (ref 4.6–13.2)

## 2023-06-06 PROCEDURE — 80053 COMPREHEN METABOLIC PANEL: CPT

## 2023-06-06 PROCEDURE — 96361 HYDRATE IV INFUSION ADD-ON: CPT

## 2023-06-06 PROCEDURE — 6370000000 HC RX 637 (ALT 250 FOR IP): Performed by: EMERGENCY MEDICINE

## 2023-06-06 PROCEDURE — 96374 THER/PROPH/DIAG INJ IV PUSH: CPT

## 2023-06-06 PROCEDURE — 81003 URINALYSIS AUTO W/O SCOPE: CPT

## 2023-06-06 PROCEDURE — 99284 EMERGENCY DEPT VISIT MOD MDM: CPT

## 2023-06-06 PROCEDURE — 2580000003 HC RX 258: Performed by: EMERGENCY MEDICINE

## 2023-06-06 PROCEDURE — 82962 GLUCOSE BLOOD TEST: CPT

## 2023-06-06 PROCEDURE — 85025 COMPLETE CBC W/AUTO DIFF WBC: CPT

## 2023-06-06 PROCEDURE — 71045 X-RAY EXAM CHEST 1 VIEW: CPT

## 2023-06-06 RX ORDER — 0.9 % SODIUM CHLORIDE 0.9 %
1000 INTRAVENOUS SOLUTION INTRAVENOUS ONCE
Status: COMPLETED | OUTPATIENT
Start: 2023-06-06 | End: 2023-06-06

## 2023-06-06 RX ORDER — SODIUM CHLORIDE, SODIUM LACTATE, POTASSIUM CHLORIDE, AND CALCIUM CHLORIDE .6; .31; .03; .02 G/100ML; G/100ML; G/100ML; G/100ML
30 INJECTION, SOLUTION INTRAVENOUS ONCE
Status: DISCONTINUED | OUTPATIENT
Start: 2023-06-06 | End: 2023-06-06

## 2023-06-06 RX ADMIN — SODIUM CHLORIDE 1000 ML: 9 INJECTION, SOLUTION INTRAVENOUS at 12:54

## 2023-06-06 RX ADMIN — INSULIN HUMAN 10 UNITS: 100 INJECTION, SOLUTION PARENTERAL at 13:49

## 2023-06-06 RX ADMIN — SODIUM CHLORIDE 1000 ML: 9 INJECTION, SOLUTION INTRAVENOUS at 12:57

## 2023-06-06 ASSESSMENT — ENCOUNTER SYMPTOMS
CONSTIPATION: 0
SHORTNESS OF BREATH: 0
NAUSEA: 0
WHEEZING: 0
EYE PAIN: 0
RHINORRHEA: 0
ABDOMINAL PAIN: 0
VOMITING: 0
COUGH: 0
SINUS PRESSURE: 0
DIARRHEA: 0
BACK PAIN: 0
TROUBLE SWALLOWING: 0
SORE THROAT: 0

## 2023-06-06 ASSESSMENT — PAIN DESCRIPTION - LOCATION: LOCATION: GENERALIZED

## 2023-06-06 ASSESSMENT — PAIN - FUNCTIONAL ASSESSMENT: PAIN_FUNCTIONAL_ASSESSMENT: 0-10

## 2023-06-06 ASSESSMENT — PAIN SCALES - GENERAL: PAINLEVEL_OUTOF10: 3

## 2023-06-06 ASSESSMENT — PAIN DESCRIPTION - PAIN TYPE: TYPE: ACUTE PAIN

## 2023-06-06 ASSESSMENT — PAIN DESCRIPTION - DESCRIPTORS: DESCRIPTORS: ACHING

## 2023-06-06 ASSESSMENT — PAIN DESCRIPTION - FREQUENCY: FREQUENCY: CONTINUOUS

## 2023-06-06 NOTE — ED TRIAGE NOTES
Pt states he's not compliant with DM meds - doesn't have his long acting. This has been going on for a week, can't get prescription filled. Pt has been self injecting insulin. Pt is pump dependent. Pt polydipsia.

## 2023-06-06 NOTE — ED PROVIDER NOTES
Assessment  BP: (!) 135/96  Pulse: 90                 PHYSICAL EXAM    (up to 7 for level 4, 8 or more for level 5)     ED Triage Vitals [06/06/23 1213]   BP Temp Temp Source Pulse Respirations SpO2 Height Weight - Scale   (!) 131/91 97.3 °F (36.3 °C) Temporal (!) 132 24 97 % 6' (1.829 m) 170 lb (77.1 kg)       Physical Exam  Vitals reviewed. Constitutional:       General: He is not in acute distress. Appearance: Normal appearance. He is not ill-appearing, toxic-appearing or diaphoretic. HENT:      Head: Normocephalic and atraumatic. Right Ear: External ear normal.      Left Ear: External ear normal.      Nose: Nose normal.      Mouth/Throat:      Mouth: Mucous membranes are dry. Pharynx: No oropharyngeal exudate or posterior oropharyngeal erythema. Eyes:      General: No scleral icterus. Extraocular Movements: Extraocular movements intact. Conjunctiva/sclera: Conjunctivae normal.      Pupils: Pupils are equal, round, and reactive to light. Cardiovascular:      Rate and Rhythm: Tachycardia present. Pulses: Normal pulses. Heart sounds: No murmur heard. No friction rub. No gallop. Pulmonary:      Effort: Pulmonary effort is normal. No respiratory distress. Breath sounds: Normal breath sounds. No stridor. No wheezing, rhonchi or rales. Abdominal:      General: There is no distension. Palpations: Abdomen is soft. Tenderness: There is no abdominal tenderness. There is no guarding or rebound. Hernia: No hernia is present. Musculoskeletal:         General: No swelling or tenderness. Normal range of motion. Cervical back: Normal range of motion. No rigidity or tenderness. Right lower leg: No edema. Left lower leg: No edema. Skin:     General: Skin is warm and dry. Capillary Refill: Capillary refill takes less than 2 seconds. Coloration: Skin is not jaundiced or pale. Findings: No erythema.    Neurological:      General: No

## 2023-06-07 LAB — GLUCOSE BLD STRIP.AUTO-MCNC: 493 MG/DL (ref 70–110)

## 2023-06-20 NOTE — PROGRESS NOTES
Chart reviewed and spoke with pt via phone conversion d/t social distancing regarding COVID, marito role as d/c planner explained pt lives alone [phone], pt sees endocrinologist Lata Rosenberg for diabetic management, cm explained [facility] programs for diabetes education pt declined,pt denies having ACP and does not wish to speak with anyone regarding it, stated his mother will make his medical decisions if needed, pt also made aware that alcohol Hotline information will be on d/c papers, cm will cont to review chart until pt is discharged. none

## 2023-09-16 ENCOUNTER — HOSPITAL ENCOUNTER (EMERGENCY)
Facility: HOSPITAL | Age: 28
Discharge: HOME | End: 2023-09-16
Attending: EMERGENCY MEDICINE
Payer: MEDICAID

## 2023-09-16 VITALS
SYSTOLIC BLOOD PRESSURE: 121 MMHG | WEIGHT: 170 LBS | HEART RATE: 95 BPM | DIASTOLIC BLOOD PRESSURE: 77 MMHG | OXYGEN SATURATION: 98 % | HEIGHT: 72 IN | BODY MASS INDEX: 23.03 KG/M2 | TEMPERATURE: 98.1 F | RESPIRATION RATE: 18 BRPM

## 2023-09-16 DIAGNOSIS — E10.65 HYPERGLYCEMIA DUE TO TYPE 1 DIABETES MELLITUS (HCC): Primary | ICD-10-CM

## 2023-09-16 LAB
ALBUMIN SERPL-MCNC: 3.3 G/DL (ref 3.4–5)
ALBUMIN/GLOB SERPL: 1.3 (ref 0.8–1.7)
ALP SERPL-CCNC: 67 U/L (ref 45–117)
ALT SERPL-CCNC: 19 U/L (ref 16–61)
ANION GAP SERPL CALC-SCNC: 2 MMOL/L (ref 3–18)
AST SERPL-CCNC: 10 U/L (ref 10–38)
BASOPHILS # BLD: 0 K/UL (ref 0–0.1)
BASOPHILS NFR BLD: 1 % (ref 0–2)
BILIRUB SERPL-MCNC: 1.1 MG/DL (ref 0.2–1)
BUN SERPL-MCNC: 15 MG/DL (ref 7–18)
BUN/CREAT SERPL: 18 (ref 12–20)
CALCIUM SERPL-MCNC: 8.3 MG/DL (ref 8.5–10.1)
CHLORIDE SERPL-SCNC: 104 MMOL/L (ref 100–111)
CHP ED QC CHECK: YES
CO2 SERPL-SCNC: 30 MMOL/L (ref 21–32)
CREAT SERPL-MCNC: 0.85 MG/DL (ref 0.6–1.3)
DIFFERENTIAL METHOD BLD: ABNORMAL
EOSINOPHIL # BLD: 0.1 K/UL (ref 0–0.4)
EOSINOPHIL NFR BLD: 3 % (ref 0–5)
ERYTHROCYTE [DISTWIDTH] IN BLOOD BY AUTOMATED COUNT: 11.8 % (ref 11.6–14.5)
GLOBULIN SER CALC-MCNC: 2.6 G/DL (ref 2–4)
GLUCOSE BLD STRIP.AUTO-MCNC: 136 MG/DL (ref 70–110)
GLUCOSE BLD STRIP.AUTO-MCNC: 255 MG/DL (ref 70–110)
GLUCOSE BLD STRIP.AUTO-MCNC: 377 MG/DL (ref 70–110)
GLUCOSE SERPL-MCNC: 401 MG/DL (ref 74–99)
HCT VFR BLD AUTO: 39.1 % (ref 36–48)
HGB BLD-MCNC: 13.8 G/DL (ref 13–16)
IMM GRANULOCYTES # BLD AUTO: 0 K/UL (ref 0–0.04)
IMM GRANULOCYTES NFR BLD AUTO: 0 % (ref 0–0.5)
LYMPHOCYTES # BLD: 1.2 K/UL (ref 0.9–3.6)
LYMPHOCYTES NFR BLD: 26 % (ref 21–52)
MAGNESIUM SERPL-MCNC: 1.9 MG/DL (ref 1.6–2.6)
MCH RBC QN AUTO: 29.4 PG (ref 24–34)
MCHC RBC AUTO-ENTMCNC: 35.3 G/DL (ref 31–37)
MCV RBC AUTO: 83.4 FL (ref 78–100)
MONOCYTES # BLD: 0.5 K/UL (ref 0.05–1.2)
MONOCYTES NFR BLD: 12 % (ref 3–10)
NEUTS SEG # BLD: 2.6 K/UL (ref 1.8–8)
NEUTS SEG NFR BLD: 58 % (ref 40–73)
NRBC # BLD: 0 K/UL (ref 0–0.01)
NRBC BLD-RTO: 0 PER 100 WBC
PLATELET # BLD AUTO: 167 K/UL (ref 135–420)
PMV BLD AUTO: 11.1 FL (ref 9.2–11.8)
POTASSIUM SERPL-SCNC: 4.1 MMOL/L (ref 3.5–5.5)
PROT SERPL-MCNC: 5.9 G/DL (ref 6.4–8.2)
RBC # BLD AUTO: 4.69 M/UL (ref 4.35–5.65)
SODIUM SERPL-SCNC: 136 MMOL/L (ref 136–145)
WBC # BLD AUTO: 4.4 K/UL (ref 4.6–13.2)

## 2023-09-16 PROCEDURE — 99284 EMERGENCY DEPT VISIT MOD MDM: CPT

## 2023-09-16 PROCEDURE — 80053 COMPREHEN METABOLIC PANEL: CPT

## 2023-09-16 PROCEDURE — 82962 GLUCOSE BLOOD TEST: CPT

## 2023-09-16 PROCEDURE — 96374 THER/PROPH/DIAG INJ IV PUSH: CPT

## 2023-09-16 PROCEDURE — 83735 ASSAY OF MAGNESIUM: CPT

## 2023-09-16 PROCEDURE — 2580000003 HC RX 258: Performed by: EMERGENCY MEDICINE

## 2023-09-16 PROCEDURE — 85025 COMPLETE CBC W/AUTO DIFF WBC: CPT

## 2023-09-16 PROCEDURE — 6370000000 HC RX 637 (ALT 250 FOR IP): Performed by: EMERGENCY MEDICINE

## 2023-09-16 PROCEDURE — 96361 HYDRATE IV INFUSION ADD-ON: CPT

## 2023-09-16 RX ORDER — ONDANSETRON 4 MG/1
4 TABLET, ORALLY DISINTEGRATING ORAL
Status: COMPLETED | OUTPATIENT
Start: 2023-09-16 | End: 2023-09-16

## 2023-09-16 RX ORDER — 0.9 % SODIUM CHLORIDE 0.9 %
1000 INTRAVENOUS SOLUTION INTRAVENOUS ONCE
Status: COMPLETED | OUTPATIENT
Start: 2023-09-16 | End: 2023-09-16

## 2023-09-16 RX ORDER — ONDANSETRON 4 MG/1
4 TABLET, ORALLY DISINTEGRATING ORAL EVERY 8 HOURS PRN
Qty: 20 TABLET | Refills: 0 | Status: SHIPPED | OUTPATIENT
Start: 2023-09-16

## 2023-09-16 RX ADMIN — ONDANSETRON 4 MG: 4 TABLET, ORALLY DISINTEGRATING ORAL at 07:53

## 2023-09-16 RX ADMIN — INSULIN HUMAN 7 UNITS: 100 INJECTION, SOLUTION PARENTERAL at 08:45

## 2023-09-16 RX ADMIN — SODIUM CHLORIDE 1000 ML: 9 INJECTION, SOLUTION INTRAVENOUS at 08:44

## 2023-09-16 ASSESSMENT — PAIN - FUNCTIONAL ASSESSMENT: PAIN_FUNCTIONAL_ASSESSMENT: NONE - DENIES PAIN

## 2023-09-16 NOTE — DISCHARGE INSTRUCTIONS
Follow-up with your primary care provider. You will need to call to make an appointment. Return to the ED for worsening symptoms, inability to tolerate oral medications or for other concerns.

## 2023-09-16 NOTE — ED NOTES
For medication: simvastatin (ZOCOR) 20 MG tablet    90 Day supply requested? Yes    Comments: in script folder     Pharmacy loaded below: Yes     Medication given per [facility] order. Education regarding medication provided. Tolerated well with no complaints. Instructed patient to utilize the call light if he/she needs anything further. Will continue to monitor.        Karma Urbina RN  09/16/23 0925

## 2023-09-16 NOTE — ED PROVIDER NOTES
[facility] EMERGENCY DEPT  EMERGENCY DEPARTMENT ENCOUNTER    Patient Name: Bal Higuera  MRN: [id number]  YOB: 1995  Provider: Jodi Reagan MD  PCP: Houston Gastelum MD   Time/Date of evaluation: 7:35 AM EDT on 9/16/23    History of Presenting Illness     Chief Complaint   Patient presents with    Blood Sugar Problem       History Provided by: Patient   History is limited by: Nothing    HISTORY (Narative):   Bal Higuera is a 33 y.o. male with a PMHX of ADD, diabetes, seizures  who presents to the emergency department (room 12) by POV C/O elevated blood sugar onset this morning. Associated sxs include nausea. Pt denies any other sxs or complaints. Patient states that his home blood sugar meter read high this morning. He bolused himself multiple doses of insulin on his insulin pump. He had associated nausea. He was driving to work and just did not feel well enough to go and so he came to the ED for evaluation. Patient states he has been improved since arrival here. Nursing Notes were all reviewed and agreed with or any disagreements were addressed in the HPI. Past History     PAST MEDICAL HISTORY:  Past Medical History:   Diagnosis Date    ADD (attention deficit disorder)     Diabetes ([address])     Insomnia     Seizure ([address])        PAST SURGICAL HISTORY:  No past surgical history on file. FAMILY HISTORY:  No family history on file. SOCIAL HISTORY:  Social History     Tobacco Use    Smoking status: Never    Smokeless tobacco: Former   Substance Use Topics    Alcohol use: Yes     Alcohol/week: 16.0 standard drinks of alcohol    Drug use: Not Currently       MEDICATIONS:  No current facility-administered medications for this encounter.      Current Outpatient Medications   Medication Sig Dispense Refill    RANITIDINE HCL PO Take by mouth      insulin lispro (HUMALOG) 100 UNIT/ML SOLN injection vial Use as directed with insulin pump      methocarbamol (ROBAXIN) 750 MG tablet Take 750 mg by mouth 4 times daily      mupirocin (BACTROBAN) 2 % cream Apply topically 2 times daily      ondansetron (ZOFRAN) 4 MG tablet Take 4 mg by mouth every 8 hours as needed         ALLERGIES:  No Known Allergies    SOCIAL DETERMINANTS OF HEALTH:  Social Determinants of Health     Tobacco Use: Not on file   Alcohol Use: Not on file   Financial Resource Strain: Not on file   Food Insecurity: Not on file   Transportation Needs: Not on file   Physical Activity: Not on file   Stress: Not on file   Social Connections: Not on file   Intimate Partner Violence: Not on file   Depression: Not on file   Housing Stability: Not on file       Review of Systems     Negative except as listed above in HPI. Physical Exam   There were no vitals filed for this visit. Physical Exam  Vitals and nursing note reviewed. Constitutional:       General: He is not in acute distress. Appearance: Normal appearance. He is normal weight. He is not ill-appearing. HENT:      Head: Normocephalic and atraumatic. Nose: Nose normal. No rhinorrhea. Mouth/Throat:      Mouth: Mucous membranes are moist.      Pharynx: No oropharyngeal exudate or posterior oropharyngeal erythema. Eyes:      General:         Right eye: No discharge. Left eye: No discharge. Extraocular Movements: Extraocular movements intact. Conjunctiva/sclera: Conjunctivae normal.      Pupils: Pupils are equal, round, and reactive to light. Cardiovascular:      Rate and Rhythm: Normal rate and regular rhythm. Heart sounds: No murmur heard. No friction rub. No gallop. Pulmonary:      Effort: Pulmonary effort is normal. No respiratory distress. Breath sounds: No wheezing, rhonchi or rales. Abdominal:      General: Bowel sounds are normal.      Palpations: Abdomen is soft. Tenderness: There is no abdominal tenderness. There is no guarding or rebound. Musculoskeletal:         General: No swelling, tenderness or deformity.  Normal

## 2023-09-16 NOTE — ED NOTES
Paperwork read with patient making note of where to  prescriptions (if applicable). IV taken out (if applicable). Armband removed and disposed of in shredder. Patient has no further questions at this time. Will discharge from system.         Roselyn Berger RN  09/16/23 1015

## 2023-09-16 NOTE — ED TRIAGE NOTES
Patient arrives via front door for high blood sugar. Stated that his insulin pump read HI and he was not able to bring it down. Patient is a Type 1 diabetic.